# Patient Record
Sex: FEMALE | Race: WHITE | NOT HISPANIC OR LATINO | Employment: FULL TIME | URBAN - METROPOLITAN AREA
[De-identification: names, ages, dates, MRNs, and addresses within clinical notes are randomized per-mention and may not be internally consistent; named-entity substitution may affect disease eponyms.]

---

## 2019-07-12 ENCOUNTER — TRANSCRIBE ORDERS (OUTPATIENT)
Dept: LAB | Facility: HOSPITAL | Age: 29
End: 2019-07-12

## 2019-07-12 ENCOUNTER — LAB (OUTPATIENT)
Dept: LAB | Facility: HOSPITAL | Age: 29
End: 2019-07-12

## 2019-07-12 DIAGNOSIS — Z3A.00 WEEKS OF GESTATION OF PREGNANCY NOT SPECIFIED: Primary | ICD-10-CM

## 2019-07-12 DIAGNOSIS — Z34.81 PRENATAL CARE, SUBSEQUENT PREGNANCY, FIRST TRIMESTER: ICD-10-CM

## 2019-07-12 DIAGNOSIS — Z3A.00 WEEKS OF GESTATION OF PREGNANCY NOT SPECIFIED: ICD-10-CM

## 2019-07-12 LAB
ABO GROUP BLD: NORMAL
BLD GP AB SCN SERPL QL: NEGATIVE
DEPRECATED RDW RBC AUTO: 41.9 FL (ref 37–54)
ERYTHROCYTE [DISTWIDTH] IN BLOOD BY AUTOMATED COUNT: 12.5 % (ref 12.3–15.4)
HCG INTACT+B SERPL-ACNC: 175.5 MIU/ML
HCT VFR BLD AUTO: 41.8 % (ref 34–46.6)
HGB BLD-MCNC: 13.7 G/DL (ref 12–15.9)
MCH RBC QN AUTO: 30 PG (ref 26.6–33)
MCHC RBC AUTO-ENTMCNC: 32.8 G/DL (ref 31.5–35.7)
MCV RBC AUTO: 91.7 FL (ref 79–97)
PLATELET # BLD AUTO: 291 10*3/MM3 (ref 140–450)
PMV BLD AUTO: 11.3 FL (ref 6–12)
PROGEST SERPL-MCNC: 8.06 NG/ML
RBC # BLD AUTO: 4.56 10*6/MM3 (ref 3.77–5.28)
RH BLD: NEGATIVE
WBC NRBC COR # BLD: 11.04 10*3/MM3 (ref 3.4–10.8)

## 2019-07-12 PROCEDURE — 84144 ASSAY OF PROGESTERONE: CPT

## 2019-07-12 PROCEDURE — 86850 RBC ANTIBODY SCREEN: CPT

## 2019-07-12 PROCEDURE — 86900 BLOOD TYPING SEROLOGIC ABO: CPT

## 2019-07-12 PROCEDURE — 84702 CHORIONIC GONADOTROPIN TEST: CPT

## 2019-07-12 PROCEDURE — 36415 COLL VENOUS BLD VENIPUNCTURE: CPT

## 2019-07-12 PROCEDURE — 85027 COMPLETE CBC AUTOMATED: CPT

## 2019-07-12 PROCEDURE — 86901 BLOOD TYPING SEROLOGIC RH(D): CPT

## 2019-07-15 ENCOUNTER — LAB (OUTPATIENT)
Dept: LAB | Facility: HOSPITAL | Age: 29
End: 2019-07-15

## 2019-07-15 ENCOUNTER — TRANSCRIBE ORDERS (OUTPATIENT)
Dept: LAB | Facility: HOSPITAL | Age: 29
End: 2019-07-15

## 2019-07-15 DIAGNOSIS — Z32.01 PREGNANCY EXAMINATION OR TEST, POSITIVE RESULT: Primary | ICD-10-CM

## 2019-07-15 DIAGNOSIS — Z32.01 PREGNANCY EXAMINATION OR TEST, POSITIVE RESULT: ICD-10-CM

## 2019-07-15 LAB — HCG INTACT+B SERPL-ACNC: 513.7 MIU/ML

## 2019-07-15 PROCEDURE — 84702 CHORIONIC GONADOTROPIN TEST: CPT

## 2019-07-15 PROCEDURE — 36415 COLL VENOUS BLD VENIPUNCTURE: CPT

## 2019-07-18 ENCOUNTER — TRANSCRIBE ORDERS (OUTPATIENT)
Dept: LAB | Facility: HOSPITAL | Age: 29
End: 2019-07-18

## 2019-07-18 ENCOUNTER — LAB (OUTPATIENT)
Dept: LAB | Facility: HOSPITAL | Age: 29
End: 2019-07-18

## 2019-07-18 DIAGNOSIS — Z32.01 PREGNANCY EXAMINATION OR TEST, POSITIVE RESULT: ICD-10-CM

## 2019-07-18 DIAGNOSIS — Z32.01 PREGNANCY EXAMINATION OR TEST, POSITIVE RESULT: Primary | ICD-10-CM

## 2019-07-18 LAB
HCG INTACT+B SERPL-ACNC: 670.2 MIU/ML
PROGEST SERPL-MCNC: 4.48 NG/ML

## 2019-07-18 PROCEDURE — 84144 ASSAY OF PROGESTERONE: CPT

## 2019-07-18 PROCEDURE — 36415 COLL VENOUS BLD VENIPUNCTURE: CPT

## 2019-07-18 PROCEDURE — 84702 CHORIONIC GONADOTROPIN TEST: CPT

## 2019-07-23 ENCOUNTER — LAB (OUTPATIENT)
Dept: LAB | Facility: HOSPITAL | Age: 29
End: 2019-07-23

## 2019-07-23 DIAGNOSIS — N92.5 RETROGRADE MENSTRUATION: Primary | ICD-10-CM

## 2019-07-23 LAB
HCG INTACT+B SERPL-ACNC: 1012 MIU/ML
PROGEST SERPL-MCNC: 9.65 NG/ML

## 2019-07-23 PROCEDURE — 84144 ASSAY OF PROGESTERONE: CPT

## 2019-07-23 PROCEDURE — 84702 CHORIONIC GONADOTROPIN TEST: CPT

## 2019-07-23 PROCEDURE — 36415 COLL VENOUS BLD VENIPUNCTURE: CPT

## 2019-08-09 ENCOUNTER — LAB REQUISITION (OUTPATIENT)
Dept: LAB | Facility: HOSPITAL | Age: 29
End: 2019-08-09

## 2019-08-09 DIAGNOSIS — O02.1 MISSED ABORTION: ICD-10-CM

## 2019-08-09 PROCEDURE — 88305 TISSUE EXAM BY PATHOLOGIST: CPT | Performed by: OBSTETRICS & GYNECOLOGY

## 2019-08-12 LAB
CYTO UR: NORMAL
LAB AP CASE REPORT: NORMAL
LAB AP CLINICAL INFORMATION: NORMAL
PATH REPORT.FINAL DX SPEC: NORMAL
PATH REPORT.GROSS SPEC: NORMAL

## 2021-10-19 VITALS
DIASTOLIC BLOOD PRESSURE: 83 MMHG | TEMPERATURE: 98.4 F | HEIGHT: 65 IN | HEART RATE: 104 BPM | BODY MASS INDEX: 36.65 KG/M2 | OXYGEN SATURATION: 99 % | SYSTOLIC BLOOD PRESSURE: 149 MMHG | WEIGHT: 220 LBS | RESPIRATION RATE: 16 BRPM

## 2021-10-19 PROCEDURE — 96372 THER/PROPH/DIAG INJ SC/IM: CPT

## 2021-10-19 PROCEDURE — 99283 EMERGENCY DEPT VISIT LOW MDM: CPT

## 2021-10-20 ENCOUNTER — HOSPITAL ENCOUNTER (EMERGENCY)
Facility: HOSPITAL | Age: 31
Discharge: HOME | End: 2021-10-20
Attending: EMERGENCY MEDICINE | Admitting: EMERGENCY MEDICINE

## 2021-10-20 ENCOUNTER — APPOINTMENT (OUTPATIENT)
Dept: ULTRASOUND IMAGING | Facility: HOSPITAL | Age: 31
End: 2021-10-20

## 2021-10-20 DIAGNOSIS — O20.0 THREATENED MISCARRIAGE IN EARLY PREGNANCY: Primary | ICD-10-CM

## 2021-10-20 LAB
ABO GROUP BLD: NORMAL
ABO GROUP BLD: NORMAL
B-HCG UR QL: POSITIVE
BACTERIA UR QL AUTO: ABNORMAL /HPF
BASOPHILS # BLD AUTO: 0.06 10*3/MM3 (ref 0–0.2)
BASOPHILS NFR BLD AUTO: 0.6 % (ref 0–1.5)
BILIRUB UR QL STRIP: NEGATIVE
CLARITY UR: CLEAR
COLOR UR: YELLOW
DEPRECATED RDW RBC AUTO: 39.4 FL (ref 37–54)
EOSINOPHIL # BLD AUTO: 0.13 10*3/MM3 (ref 0–0.4)
EOSINOPHIL NFR BLD AUTO: 1.3 % (ref 0.3–6.2)
ERYTHROCYTE [DISTWIDTH] IN BLOOD BY AUTOMATED COUNT: 12.2 % (ref 12.3–15.4)
GLUCOSE UR STRIP-MCNC: NEGATIVE MG/DL
HCG INTACT+B SERPL-ACNC: NORMAL MIU/ML
HCT VFR BLD AUTO: 38.7 % (ref 34–46.6)
HGB BLD-MCNC: 13.2 G/DL (ref 12–15.9)
HGB UR QL STRIP.AUTO: ABNORMAL
HYALINE CASTS UR QL AUTO: ABNORMAL /LPF
IMM GRANULOCYTES # BLD AUTO: 0.02 10*3/MM3 (ref 0–0.05)
IMM GRANULOCYTES NFR BLD AUTO: 0.2 % (ref 0–0.5)
KETONES UR QL STRIP: NEGATIVE
LEUKOCYTE ESTERASE UR QL STRIP.AUTO: NEGATIVE
LYMPHOCYTES # BLD AUTO: 2.08 10*3/MM3 (ref 0.7–3.1)
LYMPHOCYTES NFR BLD AUTO: 20.4 % (ref 19.6–45.3)
MCH RBC QN AUTO: 30.1 PG (ref 26.6–33)
MCHC RBC AUTO-ENTMCNC: 34.1 G/DL (ref 31.5–35.7)
MCV RBC AUTO: 88.2 FL (ref 79–97)
MONOCYTES # BLD AUTO: 0.7 10*3/MM3 (ref 0.1–0.9)
MONOCYTES NFR BLD AUTO: 6.9 % (ref 5–12)
NEUTROPHILS NFR BLD AUTO: 7.21 10*3/MM3 (ref 1.7–7)
NEUTROPHILS NFR BLD AUTO: 70.6 % (ref 42.7–76)
NITRITE UR QL STRIP: NEGATIVE
NRBC BLD AUTO-RTO: 0 /100 WBC (ref 0–0.2)
NUMBER OF DOSES: NORMAL
PH UR STRIP.AUTO: 6 [PH] (ref 5–8)
PLATELET # BLD AUTO: 204 10*3/MM3 (ref 140–450)
PMV BLD AUTO: 9.9 FL (ref 6–12)
PROT UR QL STRIP: NEGATIVE
RBC # BLD AUTO: 4.39 10*6/MM3 (ref 3.77–5.28)
RBC # UR: ABNORMAL /HPF
REF LAB TEST METHOD: ABNORMAL
RH BLD: NEGATIVE
RH BLD: NEGATIVE
SP GR UR STRIP: 1.01 (ref 1–1.03)
SQUAMOUS #/AREA URNS HPF: ABNORMAL /HPF
UROBILINOGEN UR QL STRIP: ABNORMAL
WBC # BLD AUTO: 10.2 10*3/MM3 (ref 3.4–10.8)
WBC UR QL AUTO: ABNORMAL /HPF

## 2021-10-20 PROCEDURE — 84702 CHORIONIC GONADOTROPIN TEST: CPT | Performed by: EMERGENCY MEDICINE

## 2021-10-20 PROCEDURE — 81001 URINALYSIS AUTO W/SCOPE: CPT | Performed by: EMERGENCY MEDICINE

## 2021-10-20 PROCEDURE — 96372 THER/PROPH/DIAG INJ SC/IM: CPT

## 2021-10-20 PROCEDURE — 85025 COMPLETE CBC W/AUTO DIFF WBC: CPT | Performed by: EMERGENCY MEDICINE

## 2021-10-20 PROCEDURE — 25010000002 RHO D IMMUNE GLOBULIN 1500 UNIT/2ML SOLUTION PREFILLED SYRINGE: Performed by: EMERGENCY MEDICINE

## 2021-10-20 PROCEDURE — 86901 BLOOD TYPING SEROLOGIC RH(D): CPT | Performed by: EMERGENCY MEDICINE

## 2021-10-20 PROCEDURE — 81025 URINE PREGNANCY TEST: CPT | Performed by: EMERGENCY MEDICINE

## 2021-10-20 PROCEDURE — 76817 TRANSVAGINAL US OBSTETRIC: CPT

## 2021-10-20 PROCEDURE — 86900 BLOOD TYPING SEROLOGIC ABO: CPT | Performed by: EMERGENCY MEDICINE

## 2021-10-20 RX ADMIN — HUMAN RHO(D) IMMUNE GLOBULIN 300 MCG: 1500 SOLUTION INTRAMUSCULAR; INTRAVENOUS at 02:53

## 2021-10-20 NOTE — ED PROVIDER NOTES
Subjective   History of Present Illness    Chief Complaint: Vaginal bleeding 7 weeks pregnant  History of Present Illness: 31-year-old female presents with vaginal bleeding began this evening.  Stop now.  History of 3 prior miscarriages.  Patient is concerned for miscarriage.  Onset: This evening  Duration: Single episode now resolved  Exacerbating / Alleviating factors: None  Associated symptoms: None      Nurses Notes reviewed and agree, including vitals, allergies, social history and prior medical history.     REVIEW OF SYSTEMS: All systems reviewed and not pertinent unless noted.    Positive for: Vaginal bleeding    Negative for: Pelvic pain syncope profuse bleeding  Review of Systems    Past Medical History:   Diagnosis Date   • PCOS (polycystic ovarian syndrome)        Allergies   Allergen Reactions   • Amoxicillin Hives   • Sulfa Antibiotics Hives       Past Surgical History:   Procedure Laterality Date   • CHOLECYSTECTOMY         History reviewed. No pertinent family history.    Social History     Socioeconomic History   • Marital status:    Tobacco Use   • Smoking status: Former Smoker   Vaping Use   • Vaping Use: Every day   • Substances: Nicotine   • Devices: Disposable   Substance and Sexual Activity   • Alcohol use: Never   • Drug use: Never           Objective   Physical Exam    CONSTITUTIONAL: Well developed, healthy appearing nontoxic 31-year-old  female,  in no acute distress.  VITAL SIGNS: per nursing, reviewed and noted  SKIN: exposed skin with no rashes, ulcerations or petechiae.  EYES: perrla. EOMI.  ENT: Normal voice.  Patient maintained wearing a mask throughout patient encounter due to coronavirus pandemic  RESPIRATORY:  No increased work of breathing. No retractions.   CARDIOVASCULAR:  regular rate and rhythm, no murmurs.  Good Peripheral pulses. Good cap refill to extremities.   GI: Abdomen soft, nontender, normal bowel sounds. No hernia. No ascites.  MUSCULOSKELETAL:  No  tenderness. Full ROM. Strength and tone grossly normal.  no spasms. no neck or back tenderness or spasm.   NEUROLOGIC: Alert, oriented x 3. No gross deficits. GCS 15.   PSYCH: appropriate affect.  : no bladder tenderness or distention, no CVA tenderness      Procedures     No attending physician procedures were performed on this patient.      ED Course  ED Course as of 10/20/21 0448   Wed Oct 20, 2021   0447 WBC, UA(!): 3-5 [PF]   0447 Blood, UA(!): Small (1+) [PF]   0447 HCG, Urine QL(!): Positive [PF]   0448 RH type: Negative [PF]   0448 ABO Type: AB [PF]   0448 HCG Quantitative: 91,581.00 [PF]   0448 WBC: 10.20 [PF]   0448 Hemoglobin: 13.2 [PF]   0448 Hematocrit: 38.7 [PF]   0448 Platelets: 204 [PF]      ED Course User Index  [PF] Shun Serrano DO      31-year-old female presented with vaginal bleeding 7weeks pregnant.  We will add ABO Rh, hCG quant, transvaginal ultrasound.  Monitor.  Disposition pending        Patient administered RhoGam in emergency department.  Discharged home stable condition outpatient follow-up with OB, return precautions discussed.                             MDM    Final diagnoses:   Threatened miscarriage in early pregnancy       ED Disposition  ED Disposition     ED Disposition Condition Comment    Discharge Stable           Janna Stoddard, APRCLAIRE  [address] [facility] [address] [phone]          [facility] Emergency Department  [address] [address] [phone]    As needed, If symptoms worsen    Ramirez Flores MD  [address] [address] [phone]               Medication List      No changes were made to your prescriptions during this visit.          Shun Serrano DO  10/20/21 0448

## 2021-10-27 ENCOUNTER — INITIAL PRENATAL (OUTPATIENT)
Dept: OBSTETRICS AND GYNECOLOGY | Facility: CLINIC | Age: 31
End: 2021-10-27

## 2021-10-27 VITALS — SYSTOLIC BLOOD PRESSURE: 110 MMHG | DIASTOLIC BLOOD PRESSURE: 74 MMHG | BODY MASS INDEX: 37.11 KG/M2 | WEIGHT: 223 LBS

## 2021-10-27 DIAGNOSIS — Z3A.01 LESS THAN 8 WEEKS GESTATION OF PREGNANCY: ICD-10-CM

## 2021-10-27 DIAGNOSIS — Z12.4 SCREENING FOR MALIGNANT NEOPLASM OF CERVIX: Primary | ICD-10-CM

## 2021-10-27 PROCEDURE — 99203 OFFICE O/P NEW LOW 30 MIN: CPT | Performed by: OBSTETRICS & GYNECOLOGY

## 2021-10-27 NOTE — PROGRESS NOTES
Subjective   Chief Complaint   Patient presents with   • Initial Prenatal Visit     NEw OB, seen in ER 10/20/21 for spotting,patient has had 3 miscarriages in past. No complaints/concerns      Melvin Jarvis is a 31 y.o. year old .  Patient's last menstrual period was 2021.  She presents to be seen because of  NEW OB- paitent had VB last week-- w/u negative @ ED- normal TVS. Got Rhogma.   G1:  41, 7#6, PP hemorrhage-- received one injection  3 AB:s , most recent  require suction D&C the first 2 preceded her live vaginal birth.    OTHER COMPLAINTS:  Nothing else    The following portions of the patient's history were reviewed and updated as appropriate:  She  has a past medical history of PCOS (polycystic ovarian syndrome) and Polycystic ovary syndrome.  She does not have a problem list on file.  She  has a past surgical history that includes Cholecystectomy and d & c with suction.  Her family history is not on file.  She  reports that she has quit smoking. She has never used smokeless tobacco. She reports that she does not drink alcohol and does not use drugs.  No current outpatient medications on file.     No current facility-administered medications for this visit.     No current outpatient medications on file prior to visit.     No current facility-administered medications on file prior to visit.     She is allergic to amoxicillin, latex, penicillins, shellfish-derived products, and sulfa antibiotics.    Social History    Tobacco Use      Smoking status: Former Smoker      Smokeless tobacco: Never Used    Review of Systems  Consitutional POS: nothing reported    NEG: anorexia or night sweats   Gastointestinal POS: nothing reported    NEG: bloating, change in bowel habits, melena or reflux symptoms   Genitourinary POS: nothing reported    NEG: dysuria or hematuria   Integument POS: nothing reported    NEG: moles that are changing in size, shape, color or rashes   Breast POS: nothing reported     NEG: persistent breast lump, skin dimpling or nipple discharge         Respiratory: negative  Cardiovascular: negative          Objective   /74   Wt 101 kg (223 lb)   LMP 09/02/2021   BMI 37.11 kg/m²     General:  well developed; well nourished  no acute distress   Skin:  No suspicious lesions seen   Thyroid: normal to inspection and palpation   Lungs:  breathing is unlabored  clear to auscultation bilaterally   Heart:  regular rate and rhythm, S1, S2 normal, no murmur, click, rub or gallop   Breasts:  Not performed.   Abdomen: soft, non-tender; no masses  no umbilical or inguinal hernias are present  no hepato-splenomegaly   Pelvis: Not performed.     Psychiatric: Alert and oriented ×3, mood and affect appropriate  HEENT: Atraumatic, normocephalic, normal scleral icterus  Extremities: 2+ pulses bilaterally, no edema      Lab Review   No data reviewed    Imaging   Pelvic ultrasound report        Assessment   1. NOB approximately 8 weeks with reassuring ultrasound 1 week ago  2. Vaginal bleeding first trimester this is since resolved.  Patient received RhoGam.  3. History of 3 diabetes though only one was after her last vaginal birth     Plan   1. Prenatal labs today  2. Pelvic rest  3. Repeat transvaginal sonogram in 1 week  4. Progesterone suppository  5.     No orders of the defined types were placed in this encounter.         This note was electronically signed.      October 27, 2021

## 2021-10-28 LAB
ABO GROUP BLD: ABNORMAL
BASOPHILS # BLD AUTO: 0 X10E3/UL (ref 0–0.2)
BASOPHILS NFR BLD AUTO: 0 %
BLD GP AB SCN SERPL QL: ABNORMAL
BLD GP AB SCN SERPL QL: POSITIVE
BLOOD GROUP ANTIBODIES SERPL: ABNORMAL
EOSINOPHIL # BLD AUTO: 0.1 X10E3/UL (ref 0–0.4)
EOSINOPHIL NFR BLD AUTO: 1 %
ERYTHROCYTE [DISTWIDTH] IN BLOOD BY AUTOMATED COUNT: 12.2 % (ref 11.7–15.4)
HBA1C MFR BLD: 5.2 % (ref 4.8–5.6)
HBV SURFACE AG SERPL QL IA: NEGATIVE
HCT VFR BLD AUTO: 37.2 % (ref 34–46.6)
HCV AB S/CO SERPL IA: <0.1 S/CO RATIO (ref 0–0.9)
HGB BLD-MCNC: 12.9 G/DL (ref 11.1–15.9)
HIV 1+2 AB+HIV1 P24 AG SERPL QL IA: NON REACTIVE
IMM GRANULOCYTES # BLD AUTO: 0.1 X10E3/UL (ref 0–0.1)
IMM GRANULOCYTES NFR BLD AUTO: 1 %
LYMPHOCYTES # BLD AUTO: 1.9 X10E3/UL (ref 0.7–3.1)
LYMPHOCYTES NFR BLD AUTO: 20 %
MCH RBC QN AUTO: 30.7 PG (ref 26.6–33)
MCHC RBC AUTO-ENTMCNC: 34.7 G/DL (ref 31.5–35.7)
MCV RBC AUTO: 89 FL (ref 79–97)
MONOCYTES # BLD AUTO: 0.7 X10E3/UL (ref 0.1–0.9)
MONOCYTES NFR BLD AUTO: 7 %
NEUTROPHILS # BLD AUTO: 6.7 X10E3/UL (ref 1.4–7)
NEUTROPHILS NFR BLD AUTO: 71 %
PLATELET # BLD AUTO: 254 X10E3/UL (ref 150–450)
RBC # BLD AUTO: 4.2 X10E6/UL (ref 3.77–5.28)
RH BLD: NEGATIVE
RPR SER QL: NON REACTIVE
RUBV IGG SERPL IA-ACNC: <0.9 INDEX
WBC # BLD AUTO: 9.5 X10E3/UL (ref 3.4–10.8)
XXX BLOOD GROUP AB TITR SERPL AHG: ABNORMAL {TITER}

## 2021-11-02 ENCOUNTER — ROUTINE PRENATAL (OUTPATIENT)
Dept: OBSTETRICS AND GYNECOLOGY | Facility: CLINIC | Age: 31
End: 2021-11-02

## 2021-11-02 VITALS — BODY MASS INDEX: 37.11 KG/M2 | WEIGHT: 223 LBS | DIASTOLIC BLOOD PRESSURE: 68 MMHG | SYSTOLIC BLOOD PRESSURE: 128 MMHG

## 2021-11-02 DIAGNOSIS — O09.91 ENCOUNTER FOR SUPERVISION OF HIGH RISK PREGNANCY IN FIRST TRIMESTER, ANTEPARTUM: Primary | ICD-10-CM

## 2021-11-02 DIAGNOSIS — N96 HISTORY OF RECURRENT MISCARRIAGES: ICD-10-CM

## 2021-11-02 DIAGNOSIS — O20.9 VAGINAL BLEEDING AFFECTING EARLY PREGNANCY: ICD-10-CM

## 2021-11-02 DIAGNOSIS — Z3A.10 10 WEEKS GESTATION OF PREGNANCY: ICD-10-CM

## 2021-11-02 PROCEDURE — 99214 OFFICE O/P EST MOD 30 MIN: CPT | Performed by: OBSTETRICS & GYNECOLOGY

## 2021-11-03 NOTE — PROGRESS NOTES
Chief Complaint  Routine Prenatal Visit (TVS, no complaints. )    History of Present Illness:  Melvin is a  currently at 9w3d who presents today with no complaints.  Patient has not had any further vaginal bleeding.  Patient does report previously with her bleeding she had recently had intercourse.  Patient has a history of recurrent miscarriages.  Patient did start her progesterone suppositories.  Patient did have labs last visit as noted.  Patient did receive Rhogam in ER.    Exam:  Vitals:  See prenatal flowsheet as noted and reviewed  General: Alert, cooperative, and does not appear in any distress  Abdomen:   See prenatal flowsheet as noted and reviewed    Uterus gravid, non-tender; no palpable masses    No guarding or rebound tenderness  Pelvic:  See prenatal flowsheet as noted and reviewed  Ext:  See prenatal flowsheet as noted and reviewed    Moves extremities well, no cyanosis and no redness  Urine:  See prenatal flowsheet as noted and reviewed    Data Review:  The following data was reviewed by: Paige Zaldivar MD on 2021:  Prenatal Labs:  Lab Results   Component Value Date    HGB 12.9 10/27/2021    RUBELLAABIGG <0.90 (L) 10/27/2021    HEPBSAG Negative 10/27/2021    ABO AB 10/27/2021    RH Negative 10/27/2021    ABSCRN See Final Results 10/27/2021    ABSCRN Positive (A) 10/27/2021    [id number] Non Reactive 10/27/2021    HEPCVIRUSABY <0.1 10/27/2021       Initial Prenatal on 10/27/2021   Component Date Value   • Hepatitis B Surface Ag 10/27/2021 Negative    • Hep C Virus Ab 10/27/2021 <0.1    • RPR 10/27/2021 Non Reactive    • Rubella Antibodies, IgG 10/27/2021 <0.90*   • ABO Type 10/27/2021 AB    • Rh Factor 10/27/2021 Negative    • Antibody Screen 10/27/2021 See Final Results    • HIV Screen 4th Gen w/RFX* 10/27/2021 Non Reactive    • WBC 10/27/2021 9.5    • RBC 10/27/2021 4.20    • Hemoglobin 10/27/2021 12.9    • Hematocrit 10/27/2021 37.2    • MCV 10/27/2021 89    • MCH 10/27/2021 30.7    •  MCHC 10/27/2021 34.7    • RDW 10/27/2021 12.2    • Platelets 10/27/2021 254    • Neutrophil Rel % 10/27/2021 71    • Lymphocyte Rel % 10/27/2021 20    • Monocyte Rel % 10/27/2021 7    • Eosinophil Rel % 10/27/2021 1    • Basophil Rel % 10/27/2021 0    • Neutrophils Absolute 10/27/2021 6.7    • Lymphocytes Absolute 10/27/2021 1.9    • Monocytes Absolute 10/27/2021 0.7    • Eosinophils Absolute 10/27/2021 0.1    • Basophils Absolute 10/27/2021 0.0    • Immature Granulocyte Rel* 10/27/2021 1    • Immature Grans Absolute 10/27/2021 0.1    • Hemoglobin A1C 10/27/2021 5.2    • Antibody Screen 10/27/2021 Positive*   • Antibody Id. #1 10/27/2021 Anti-D    • Antonietta Titer #1 10/27/2021 Comment    Admission on 10/20/2021, Discharged on 10/20/2021   Component Date Value   • Color, UA 10/20/2021 Yellow    • Appearance, UA 10/20/2021 Clear    • pH, UA 10/20/2021 6.0    • Specific Gravity, UA 10/20/2021 1.008    • Glucose, UA 10/20/2021 Negative    • Ketones, UA 10/20/2021 Negative    • Bilirubin, UA 10/20/2021 Negative    • Blood, UA 10/20/2021 Small (1+)*   • Protein, UA 10/20/2021 Negative    • Leuk Esterase, UA 10/20/2021 Negative    • Nitrite, UA 10/20/2021 Negative    • Urobilinogen, UA 10/20/2021 0.2 E.U./dL    • HCG, Urine QL 10/20/2021 Positive*   • ABO Type 10/20/2021 AB    • RH type 10/20/2021 Negative    • HCG Quantitative 10/20/2021 91,581.00    • WBC 10/20/2021 10.20    • RBC 10/20/2021 4.39    • Hemoglobin 10/20/2021 13.2    • Hematocrit 10/20/2021 38.7    • MCV 10/20/2021 88.2    • MCH 10/20/2021 30.1    • MCHC 10/20/2021 34.1    • RDW 10/20/2021 12.2*   • RDW-SD 10/20/2021 39.4    • MPV 10/20/2021 9.9    • Platelets 10/20/2021 204    • Neutrophil % 10/20/2021 70.6    • Lymphocyte % 10/20/2021 20.4    • Monocyte % 10/20/2021 6.9    • Eosinophil % 10/20/2021 1.3    • Basophil % 10/20/2021 0.6    • Immature Grans % 10/20/2021 0.2    • Neutrophils, Absolute 10/20/2021 7.21*   • Lymphocytes, Absolute 10/20/2021 2.08     • Monocytes, Absolute 10/20/2021 0.70    • Eosinophils, Absolute 10/20/2021 0.13    • Basophils, Absolute 10/20/2021 0.06    • Immature Grans, Absolute 10/20/2021 0.02    • nRBC 10/20/2021 0.0    • RBC, UA 10/20/2021 None Seen    • WBC, UA 10/20/2021 3-5*   • Bacteria, UA 10/20/2021 None Seen    • Squamous Epithelial Cell* 10/20/2021 0-2    • Hyaline Casts, UA 10/20/2021 None Seen    • Methodology 10/20/2021 Manual Light Microscopy    • ABO Type 10/20/2021 AB    • RH type 10/20/2021 Negative    • Number of Doses 10/20/2021 Recommend 1 vial of RhIg      Imaging:  US Ob Transvaginal  Melvin Jarvis  : 1990  MRN: [id number]  Date: 2021    Reason for exam/History: Viability, spotting    Ultrasound images are reviewed.  An intrauterine pregnancy is noted.  A   gestational sac is seen.  A yolk sac is noted.  The pregnancy measures 9   weeks 0 days gestation.  Cardiac activity is noted.  The fetal heart rate   measures 161  beats per minute.  The adnexa was noted to be normal in   appearance.  There is a 1.16 cm collapsing cyst seen on the left ovary   consistent with a corpus luteal cyst of pregnancy.  There was normal   vascular flow. There is no free fluid noted.    The exam limitations noted:  none    See ultrasound report for measurements and structures identified.    Paige Zaldivar MD, [facility]  OB GYN [place]    Medical Records:  None    Assessment and Plan:  Problem List Items Addressed This Visit     None      Visit Diagnoses     Encounter for supervision of high risk pregnancy in first trimester, antepartum    -  Primary  Topics discussed:     ab precautions  genetic screening - Today we discussed genetic testing.  She is aware that the MSAFP-4 and NIPT - [id number] is a screening test.  A screening test is not a diagnostic test.  This means that a negative test does not guarantee an unaffected fetus and a positive test does not mean the fetus has the condition for which the  test is being performed.  If the test returns positive, a diagnostic test should be consider to determine if the fetus in fact has the condition.  After considering the options previously presented, she is interested in having genetic testing performed.  Patient will return for genetic testing as discussed.  Patient is to follow-up with repeat imaging and labs as discussed.    10 weeks gestation of pregnancy        Relevant Orders    [id number] PLUS Core+SCA - Blood,    History of recurrent miscarriages      Patient is to continue her progesterone suppositories as discussed.    Vaginal bleeding affecting early pregnancy      Patient is to continue pelvic rest at present.        Follow Up/Instructions:  Follow up as scheduled.  Patient was given instructions and counseling regarding her condition or for health maintenance advice. Please see specific information pulled into the AVS if appropriate.     Note: Speech recognition transcription software may have been used to dictate portions of this document.  An attempt at proofreading has been made though minor errors in transcription may still be present.    This note was electronically signed.  Paige Zaldivar M.D.

## 2021-11-12 ENCOUNTER — LAB (OUTPATIENT)
Dept: LAB | Facility: HOSPITAL | Age: 31
End: 2021-11-12

## 2021-11-12 DIAGNOSIS — Z3A.10 10 WEEKS GESTATION OF PREGNANCY: ICD-10-CM

## 2021-11-12 PROCEDURE — 36415 COLL VENOUS BLD VENIPUNCTURE: CPT

## 2021-11-16 ENCOUNTER — ROUTINE PRENATAL (OUTPATIENT)
Dept: OBSTETRICS AND GYNECOLOGY | Facility: CLINIC | Age: 31
End: 2021-11-16

## 2021-11-16 VITALS — DIASTOLIC BLOOD PRESSURE: 74 MMHG | SYSTOLIC BLOOD PRESSURE: 128 MMHG | WEIGHT: 226 LBS | BODY MASS INDEX: 37.61 KG/M2

## 2021-11-16 DIAGNOSIS — O26.21 HIGH RISK PREGNANCY IN FIRST TRIMESTER DUE TO RECURRENT PREGNANCY LOSS: Primary | ICD-10-CM

## 2021-11-16 PROCEDURE — 99213 OFFICE O/P EST LOW 20 MIN: CPT | Performed by: MIDWIFE

## 2021-11-16 RX ORDER — PRENATAL VIT/IRON FUM/FOLIC AC 27MG-0.8MG
TABLET ORAL DAILY
COMMUNITY

## 2021-11-16 NOTE — PROGRESS NOTES
Chief Complaint   Patient presents with   • Routine Prenatal Visit     Patient states she is doing well        HPI: Melvin is a  currently at 11w2d who today reports the following:  She has continued her progesterone suppositories.  She states 1 day she had a bad headache that finally resolved with rest.  She has also felt that she has been more melton while using the suppositories.  She denies any bleeding.                EXAM:     Vitals:    21 1549   BP: 128/74      Abdomen:   See prenatal flowsheet as noted and reviewed, soft, nontender   Pelvic:  See prenatal flowsheet as noted and reviewed   Urine:  See prenatal flowsheet as noted and reviewed    Lab Results   Component Value Date    ABO AB 10/27/2021    RH Negative 10/27/2021    ABSCRN See Final Results 10/27/2021    ABSCRN Positive (A) 10/27/2021       MDM:  Impression: Supervision of high risk pregnancy-recurrent miscarriages  Rh negative   Tests done today: none   Topics discussed: Continue progesterone suppositories  Awaiting genetic screening  Reviewed OB labs  Discussed danger signs   Tests next visit: none                RTO:                        4 weeks    This note was electronically signed.  Crystal Craven, APRN  2021

## 2021-11-18 LAB
CFDNA.FET/CFDNA.TOTAL SFR FETUS: NORMAL %
CITATION REF LAB TEST: NORMAL
FET 13+18+21+X+Y ANEUP PLAS.CFDNA: NEGATIVE
FET CHR 21 TS PLAS.CFDNA QL: NEGATIVE
FET MS X RISK WBC.DNA+CFDNA QL: NOT DETECTED
FET SEX PLAS.CFDNA DOSAGE CFDNA: NORMAL
FET TS 13 RISK PLAS.CFDNA QL: NEGATIVE
FET TS 18 RISK WBC.DNA+CFDNA QL: NEGATIVE
FET X + Y ANEUP RISK PLAS.CFDNA SEQ-IMP: NOT DETECTED
GA EST FROM CONCEPTION DATE: NORMAL D
GESTATIONAL AGE > 9:: YES
LAB DIRECTOR NAME PROVIDER: NORMAL
LAB DIRECTOR NAME PROVIDER: NORMAL
LABORATORY COMMENT REPORT: NORMAL
LIMITATIONS OF THE TEST: NORMAL
NEGATIVE PREDICTIVE VALUE: NORMAL
NOTE: NORMAL
PERFORMANCE CHARACTERISTICS: NORMAL
POSITIVE PREDICTIVE VALUE: NORMAL
REF LAB TEST METHOD: NORMAL
TEST PERFORMANCE INFO SPEC: NORMAL

## 2021-12-14 ENCOUNTER — ROUTINE PRENATAL (OUTPATIENT)
Dept: OBSTETRICS AND GYNECOLOGY | Facility: CLINIC | Age: 31
End: 2021-12-14

## 2021-12-14 VITALS — BODY MASS INDEX: 38.44 KG/M2 | WEIGHT: 231 LBS | DIASTOLIC BLOOD PRESSURE: 68 MMHG | SYSTOLIC BLOOD PRESSURE: 132 MMHG

## 2021-12-14 DIAGNOSIS — N96 HISTORY OF RECURRENT MISCARRIAGES: ICD-10-CM

## 2021-12-14 DIAGNOSIS — Z3A.15 15 WEEKS GESTATION OF PREGNANCY: ICD-10-CM

## 2021-12-14 DIAGNOSIS — O21.9 NAUSEA AND VOMITING DURING PREGNANCY PRIOR TO 22 WEEKS GESTATION: ICD-10-CM

## 2021-12-14 DIAGNOSIS — O09.92 ENCOUNTER FOR SUPERVISION OF HIGH RISK PREGNANCY IN SECOND TRIMESTER, ANTEPARTUM: Primary | ICD-10-CM

## 2021-12-14 DIAGNOSIS — K21.9 GASTROESOPHAGEAL REFLUX DISEASE WITHOUT ESOPHAGITIS: ICD-10-CM

## 2021-12-14 PROCEDURE — 99214 OFFICE O/P EST MOD 30 MIN: CPT | Performed by: OBSTETRICS & GYNECOLOGY

## 2021-12-15 ENCOUNTER — TELEPHONE (OUTPATIENT)
Dept: OBSTETRICS AND GYNECOLOGY | Facility: CLINIC | Age: 31
End: 2021-12-15

## 2021-12-15 NOTE — TELEPHONE ENCOUNTER
----- Message from Daisy Banks MA sent at 12/15/2021  9:32 AM EST -----  Patient was seen on 12/14/2021, has developed vaginal discharge ( yellow-greenish) color, mild odor.  Want's to know if she needs to be seen or if she can have RX.    Dr Kayley Ellsworth [facility] [place]      Thank You

## 2021-12-15 NOTE — PROGRESS NOTES
Chief Complaint  Routine Prenatal Visit (No complaints. )    History of Present Illness:  Melvin is a  currently at 15w3d who presents today with no complaints.  Patient has been off her progesterone supplements for 2 weeks now.  Patient denies any cramping or contractions.  Patient denies any vaginal bleeding.  Patient has not felt any fetal movement at present.  Patient did have genetic screening as previously noted.   Patient does report having reflux.  Patient declines any treatment at present.  Patient does report having continued nausea and occasional emesis.    Exam:  Vitals:  See prenatal flowsheet as noted and reviewed  General: Alert, cooperative, and does not appear in any distress  Abdomen:   See prenatal flowsheet as noted and reviewed    Uterus gravid, non-tender; no palpable masses    No guarding or rebound tenderness  Pelvic:  See prenatal flowsheet as noted and reviewed  Ext:  See prenatal flowsheet as noted and reviewed    Moves extremities well, no cyanosis and no redness  Urine:  See prenatal flowsheet as noted and reviewed    Data Review:  The following data was reviewed by: Paige Zaldivar MD on 2021:  Prenatal Labs:  Lab Results   Component Value Date    HGB 12.9 10/27/2021    RUBELLAABIGG <0.90 (L) 10/27/2021    HEPBSAG Negative 10/27/2021    ABO AB 10/27/2021    RH Negative 10/27/2021    ABSCRN See Final Results 10/27/2021    ABSCRN Positive (A) 10/27/2021    [id number] Non Reactive 10/27/2021    HEPCVIRUSABY <0.1 10/27/2021       No visits with results within 1 Month(s) from this visit.   Latest known visit with results is:   Lab on 2021   Component Date Value   • Gestation 2021 Connelly    • Fetal Fraction 2021 9%    • Gestational Age >9: 2021 Yes    • Result 2021 Negative    •  Comments 2021 Comment    • Approved By 2021 Comment    • TRISOMY 21 (DOWN SYNDROM* 2021 Negative    • TRISOMY 18 (HAMMONDS SYND* 2021  Negative    • TRISOMY 13 (PATAU SYNDRO* 2021 Negative    • FETAL SEX 2021 Comment    • MONOSOMY X (ESQUEDA SYNDR* 2021 Not Detected    • XYY (HIRSCH SYNDROME) 2021 Not Detected    • XXY (KLINEFELTER SYNDROM* 2021 Not Detected    • XXX (TRIPLE X SYNDROME) 2021 Not Detected    • NEGATIVE PREDICTIVE VALUE 2021 Note    • POSITIVE PREDICTIVE VALUE 2021 N/A    • About The Test 2021 Comment    • Test Method 2021 Comment    • Performance 2021 Comment    • PERFORMANCE CHARACTERIST* 2021 Note    • Limitations of the Test 2021 Comment    • Note 2021 Comment    • References 2021 Comment      Imaging:  US Ob Transvaginal  Melvin Jarvis  : 1990  MRN: [id number]  Date: 2021    Reason for exam/History: Viability, spotting    Ultrasound images are reviewed.  An intrauterine pregnancy is noted.  A   gestational sac is seen.  A yolk sac is noted.  The pregnancy measures 9   weeks 0 days gestation.  Cardiac activity is noted.  The fetal heart rate   measures 161  beats per minute.  The adnexa was noted to be normal in   appearance.  There is a 1.16 cm collapsing cyst seen on the left ovary   consistent with a corpus luteal cyst of pregnancy.  There was normal   vascular flow. There is no free fluid noted.    The exam limitations noted:  none    See ultrasound report for measurements and structures identified.    Paige Zaldivar MD, [facility]  OB GYN [place]    Medical Records:  None    Assessment and Plan:  Problem List Items Addressed This Visit     None      Visit Diagnoses     Encounter for supervision of high risk pregnancy in second trimester, antepartum    -  Primary  Topics discussed:     ab precautions  genetic screening - Today we discussed genetic testing.  She is aware that the MSAFP-4 is a screening test.  A screening test is not a diagnostic test.  This means that a negative test does not guarantee an  unaffected fetus and a positive test does not mean the fetus has the condition for which the test is being performed.  If the test returns positive, a diagnostic test should be consider to determine if the fetus in fact has the condition.  After considering the options previously presented, she is still undecided if she is interested in having genetic testing performed.  GERD management  kick counts and fetal movement  PIH precautions  Anatomic scan next visit as noted.  MSAFP at that time if desired.    Relevant Orders    US Ob 14 + Weeks Single or First Gestation    Alpha Fetoprotein, Maternal    15 weeks gestation of pregnancy        Relevant Orders    US Ob 14 + Weeks Single or First Gestation    History of recurrent miscarriages      AB precautions and instructions have been given.    Nausea and vomiting during pregnancy prior to 22 weeks gestation      I discussed with the patient the various treatment options.  Patient declines any treatment at this time.  Instructions and precautions have been given.    Gastroesophageal reflux disease without esophagitis      I discussed with the patient the use of Tums or Pepcid.  Instructions and precautions were given.  Patient is to call for worsening of her symptoms.        Follow Up/Instructions:  Follow up as scheduled.  Patient was given instructions and counseling regarding her condition or for health maintenance advice. Please see specific information pulled into the AVS if appropriate.     Note: Speech recognition transcription software may have been used to dictate portions of this document.  An attempt at proofreading has been made though minor errors in transcription may still be present.    This note was electronically signed.  Paige Zaldivar M.D.

## 2021-12-17 ENCOUNTER — ROUTINE PRENATAL (OUTPATIENT)
Dept: OBSTETRICS AND GYNECOLOGY | Facility: CLINIC | Age: 31
End: 2021-12-17

## 2021-12-17 VITALS — BODY MASS INDEX: 38.11 KG/M2 | DIASTOLIC BLOOD PRESSURE: 70 MMHG | WEIGHT: 229 LBS | SYSTOLIC BLOOD PRESSURE: 112 MMHG

## 2021-12-17 DIAGNOSIS — N89.8 VAGINAL DISCHARGE: Primary | ICD-10-CM

## 2021-12-17 DIAGNOSIS — N39.0 URINARY TRACT INFECTION WITHOUT HEMATURIA, SITE UNSPECIFIED: ICD-10-CM

## 2021-12-17 PROCEDURE — 99214 OFFICE O/P EST MOD 30 MIN: CPT | Performed by: NURSE PRACTITIONER

## 2021-12-17 RX ORDER — NITROFURANTOIN 25; 75 MG/1; MG/1
100 CAPSULE ORAL 2 TIMES DAILY
Qty: 14 CAPSULE | Refills: 0 | Status: SHIPPED | OUTPATIENT
Start: 2021-12-17 | End: 2021-12-24

## 2021-12-17 NOTE — PROGRESS NOTES
[id number]  Chief Complaint   Patient presents with   • Routine Prenatal Visit      c/o vaginal discharge        HPI  Melvin is a  currently at 15w5d who today reports the following:   S/S UTI - urgency and frequency - hx of UTI  ?? Abnormal Vaginal d/c        EXAM  /70   Wt 104 kg (229 lb)   LMP 2021   BMI 38.11 kg/m²  -See Prenatal Assessment  General Appearance:  Pleasant  Lungs: Breathing unlabored  Abdomen:  See flow sheet for Fundal ht, FM, FHT's  LE: Neg edema  V/E: Not performed     Social History     Tobacco Use   • Smoking status: Former Smoker     Packs/day: 1.50     Years: 15.00     Pack years: 22.50     Types: Cigarettes, Electronic Cigarette     Quit date: 2021     Years since quittin.8   • Smokeless tobacco: Never Used   Vaping Use   • Vaping Use: Every day   • Substances: Nicotine   • Devices: Disposable   • Passive vaping exposure: Yes   Substance Use Topics   • Alcohol use: Never   • Drug use: Never         Lab Results   Component Value Date    ABO AB 10/27/2021    RH Negative 10/27/2021    ABSCRN See Final Results 10/27/2021    ABSCRN Positive (A) 10/27/2021       MDM  Impression: Pregnancy with active problem(s) &/or complication(s)   UTI  Abnormal vaginal discharge  Rh neg    Tests done today: Vaginal culture   U/A C&S  Option for AFP   Topics discussed: Macrobid escribed - may use AZO if desires  Await vaginal culture report   Rhogam at 28 wks   Flu vaccination  Nutrition reviewed   encouraged questions - call prn   Written info optional/provided:  -2nd trimester of pregnancy   Tests next visit: U/S

## 2021-12-19 LAB
BACTERIA UR CULT: NO GROWTH
BACTERIA UR CULT: NORMAL

## 2021-12-20 LAB
A VAGINAE DNA VAG QL NAA+PROBE: ABNORMAL SCORE
BVAB2 DNA VAG QL NAA+PROBE: ABNORMAL SCORE
C ALBICANS DNA VAG QL NAA+PROBE: NEGATIVE
C GLABRATA DNA VAG QL NAA+PROBE: NEGATIVE
C TRACH DNA VAG QL NAA+PROBE: NEGATIVE
MEGA1 DNA VAG QL NAA+PROBE: ABNORMAL SCORE
N GONORRHOEA DNA VAG QL NAA+PROBE: NEGATIVE
T VAGINALIS DNA VAG QL NAA+PROBE: NEGATIVE

## 2021-12-21 RX ORDER — METRONIDAZOLE 500 MG/1
500 TABLET ORAL 2 TIMES DAILY
Qty: 14 TABLET | Refills: 0 | Status: SHIPPED | OUTPATIENT
Start: 2021-12-21 | End: 2021-12-28

## 2022-01-03 ENCOUNTER — ROUTINE PRENATAL (OUTPATIENT)
Dept: OBSTETRICS AND GYNECOLOGY | Facility: CLINIC | Age: 32
End: 2022-01-03

## 2022-01-03 VITALS — DIASTOLIC BLOOD PRESSURE: 68 MMHG | SYSTOLIC BLOOD PRESSURE: 118 MMHG | WEIGHT: 235 LBS | BODY MASS INDEX: 39.11 KG/M2

## 2022-01-03 DIAGNOSIS — Z34.92 NORMAL PREGNANCY, SECOND TRIMESTER: Primary | ICD-10-CM

## 2022-01-03 PROCEDURE — 99213 OFFICE O/P EST LOW 20 MIN: CPT | Performed by: NURSE PRACTITIONER

## 2022-01-03 NOTE — PROGRESS NOTES
[id number]  Chief Complaint   Patient presents with   • Routine Prenatal Visit     Anatomy Scan, No complaints/concerns         HPI  Melvin is a  currently at 18w1d who today reports the following:     No c/o      EXAM  /68   Wt 107 kg (235 lb)   LMP 2021   BMI 39.11 kg/m²  -See Prenatal Assessment  General Appearance:  Pleasant  Lungs: Breathing unlabored  Abdomen:  See flow sheet for Fundal ht, FM, FHT's  LE: Neg edema  V/E: Not performed     Social History     Tobacco Use   • Smoking status: Former Smoker     Packs/day: 1.50     Years: 15.00     Pack years: 22.50     Types: Cigarettes, Electronic Cigarette     Quit date: 2021     Years since quittin.8   • Smokeless tobacco: Never Used   Vaping Use   • Vaping Use: Every day   • Substances: Nicotine   • Devices: Disposable   • Passive vaping exposure: Yes   Substance Use Topics   • Alcohol use: Never   • Drug use: Never         Lab Results   Component Value Date    ABO AB 10/27/2021    RH Negative 10/27/2021    ABSCRN See Final Results 10/27/2021    ABSCRN Positive (A) 10/27/2021       MDM  Impression: Supervision of normal pregnancy   Tests done today: AFP - declined    U/S - 55% growth    Topics discussed: Nutrition reviewed   encouraged questions - call prn   Written info optional/provided:  -2nd trimester of pregnancy   Tests next visit: none

## 2022-01-03 NOTE — PATIENT INSTRUCTIONS
Second Trimester of Pregnancy  The second trimester is from week 13 through week 28, months 4 through 6. The second trimester is often a time when you feel your best. Your body has also adjusted to being pregnant, and you begin to feel better physically. Usually, morning sickness has lessened or quit completely, you may have more energy, and you may have an increase in appetite. The second trimester is also a time when the fetus is growing rapidly. At the end of the sixth month, the fetus is about 9 inches long and weighs about 1½ pounds. You will likely begin to feel the baby move (quickening) between 18 and 20 weeks of the pregnancy.  BODY CHANGES  Your body goes through many changes during pregnancy. The changes vary from woman to woman.   · Your weight will continue to increase. You will notice your lower abdomen bulging out.  · You may begin to get stretch marks on your hips, abdomen, and breasts.  · You may develop headaches that can be relieved by medicines approved by your health care provider.  · You may urinate more often because the fetus is pressing on your bladder.  · You may develop or continue to have heartburn as a result of your pregnancy.  · You may develop constipation because certain hormones are causing the muscles that push waste through your intestines to slow down.  · You may develop hemorrhoids or swollen, bulging veins (varicose veins).  · You may have back pain because of the weight gain and pregnancy hormones relaxing your joints between the bones in your pelvis and as a result of a shift in weight and the muscles that support your balance.  · Your breasts will continue to grow and be tender.  · Your gums may bleed and may be sensitive to brushing and flossing.  · Dark spots or blotches (chloasma, mask of pregnancy) may develop on your face. This will likely fade after the baby is born.  · A dark line from your belly button to the pubic area (linea nigra) may appear. This will likely fade  after the baby is born.  · You may have changes in your hair. These can include thickening of your hair, rapid growth, and changes in texture. Some women also have hair loss during or after pregnancy, or hair that feels dry or thin. Your hair will most likely return to normal after your baby is born.  WHAT TO EXPECT AT YOUR PRENATAL VISITS  During a routine prenatal visit:  · You will be weighed to make sure you and the fetus are growing normally.  · Your blood pressure will be taken.  · Your abdomen will be measured to track your baby's growth.  · The fetal heartbeat will be listened to.  · Any test results from the previous visit will be discussed.  Your health care provider may ask you:  · How you are feeling.  · If you are feeling the baby move.  · If you have had any abnormal symptoms, such as leaking fluid, bleeding, severe headaches, or abdominal cramping.  · If you are using any tobacco products, including cigarettes, chewing tobacco, and electronic cigarettes.  · If you have any questions.  Other tests that may be performed during your second trimester include:  · Blood tests that check for:  ¨ Low iron levels (anemia).  ¨ Gestational diabetes (between 24 and 28 weeks).  ¨ Rh antibodies.  · Urine tests to check for infections, diabetes, or protein in the urine.  · An ultrasound to confirm the proper growth and development of the baby.  · An amniocentesis to check for possible genetic problems.  · Fetal screens for spina bifida and Down syndrome.  · HIV (human immunodeficiency virus) testing. Routine prenatal testing includes screening for HIV, unless you choose not to have this test.  HOME CARE INSTRUCTIONS   · Avoid all smoking, herbs, alcohol, and unprescribed drugs. These chemicals affect the formation and growth of the baby.  · Do not use any tobacco products, including cigarettes, chewing tobacco, and electronic cigarettes. If you need help quitting, ask your health care provider. You may receive  counseling support and other resources to help you quit.  · Follow your health care provider's instructions regarding medicine use. There are medicines that are either safe or unsafe to take during pregnancy.  · Exercise only as directed by your health care provider. Experiencing uterine cramps is a good sign to stop exercising.  · Continue to eat regular, healthy meals.  · Wear a good support bra for breast tenderness.  · Do not use hot tubs, steam rooms, or saunas.  · Wear your seat belt at all times when driving.  · Avoid raw meat, uncooked cheese, cat litter boxes, and soil used by cats. These carry germs that can cause birth defects in the baby.  · Take your prenatal vitamins.  · Take 2786-1714 mg of calcium daily starting at the 20th week of pregnancy until you deliver your baby.  · Try taking a stool softener (if your health care provider approves) if you develop constipation. Eat more high-fiber foods, such as fresh vegetables or fruit and whole grains. Drink plenty of fluids to keep your urine clear or pale yellow.  · Take warm sitz baths to soothe any pain or discomfort caused by hemorrhoids. Use hemorrhoid cream if your health care provider approves.  · If you develop varicose veins, wear support hose. Elevate your feet for 15 minutes, 3-4 times a day. Limit salt in your diet.  · Avoid heavy lifting, wear low heel shoes, and practice good posture.  · Rest with your legs elevated if you have leg cramps or low back pain.  · Visit your dentist if you have not gone yet during your pregnancy. Use a soft toothbrush to brush your teeth and be gentle when you floss.  · A sexual relationship may be continued unless your health care provider directs you otherwise.  · Continue to go to all your prenatal visits as directed by your health care provider.  SEEK MEDICAL CARE IF:   · You have dizziness.  · You have mild pelvic cramps, pelvic pressure, or nagging pain in the abdominal area.  · You have persistent nausea,  vomiting, or diarrhea.  · You have a bad smelling vaginal discharge.  · You have pain with urination.  SEEK IMMEDIATE MEDICAL CARE IF:   · You have a fever.  · You are leaking fluid from your vagina.  · You have spotting or bleeding from your vagina.  · You have severe abdominal cramping or pain.  · You have rapid weight gain or loss.  · You have shortness of breath with chest pain.  · You notice sudden or extreme swelling of your face, hands, ankles, feet, or legs.  · You have not felt your baby move in over an hour.  · You have severe headaches that do not go away with medicine.  · You have vision changes.     This information is not intended to replace advice given to you by your health care provider. Make sure you discuss any questions you have with your health care provider.

## 2022-02-02 ENCOUNTER — ROUTINE PRENATAL (OUTPATIENT)
Dept: OBSTETRICS AND GYNECOLOGY | Facility: CLINIC | Age: 32
End: 2022-02-02

## 2022-02-02 VITALS — BODY MASS INDEX: 40.27 KG/M2 | WEIGHT: 242 LBS | DIASTOLIC BLOOD PRESSURE: 74 MMHG | SYSTOLIC BLOOD PRESSURE: 116 MMHG

## 2022-02-02 DIAGNOSIS — Z34.92 PRENATAL CARE IN SECOND TRIMESTER: Primary | ICD-10-CM

## 2022-02-02 PROCEDURE — 99213 OFFICE O/P EST LOW 20 MIN: CPT | Performed by: OBSTETRICS & GYNECOLOGY

## 2022-02-02 RX ORDER — ALBUTEROL SULFATE 2.5 MG/3ML
2.5 SOLUTION RESPIRATORY (INHALATION) EVERY 4 HOURS PRN
Status: ON HOLD | COMMUNITY
End: 2022-04-28

## 2022-02-02 NOTE — PROGRESS NOTES
Prenatal Care Visit    Subjective   Chief Complaint   Patient presents with   • Routine Prenatal Visit     having difficulties catching breath, request refill (Albuterol) on inhaler she used lst year when + Covid       History:   Melvin is a  currently at 22w3d who presents for a prenatal care visit today.    No major issues.    Social History    Tobacco Use      Smoking status: Former Smoker        Packs/day: 1.50        Years: 15.00        Pack years: 22.5        Types: Cigarettes, Electronic Cigarette        Quit date: 2021        Years since quittin.9      Smokeless tobacco: Never Used       Objective   /74   Wt 110 kg (242 lb)   LMP 2021   BMI 40.27 kg/m²   Physical Exam:  Normal, gestational age-appropriate exam today        Plan   Medical Decision Making:    I have reviewed the prenatal labs and ultrasound(s) today. I have reviewed the most recent prenatal progress note(s).    Diagnosis: Supervision of low risk pregnancy   Asthma   Tests/Orders/Rx today: No orders of the defined types were placed in this encounter.      Medication Management: refill Albuterol inhaler     Topics discussed: Prenatal care milestones  Asthma   Weight + Diet   Tests next visit: GCT  HgB   Next visit: 4 week(s)     Ramirez Flores MD  Obstetrics and Gynecology  [facility]

## 2022-03-02 ENCOUNTER — ROUTINE PRENATAL (OUTPATIENT)
Dept: OBSTETRICS AND GYNECOLOGY | Facility: CLINIC | Age: 32
End: 2022-03-02

## 2022-03-02 VITALS — WEIGHT: 247 LBS | SYSTOLIC BLOOD PRESSURE: 132 MMHG | BODY MASS INDEX: 41.1 KG/M2 | DIASTOLIC BLOOD PRESSURE: 70 MMHG

## 2022-03-02 DIAGNOSIS — Z13.1 DIABETES MELLITUS SCREENING: ICD-10-CM

## 2022-03-02 DIAGNOSIS — Z34.92 PRENATAL CARE IN SECOND TRIMESTER: Primary | ICD-10-CM

## 2022-03-02 LAB
BASOPHILS # BLD AUTO: 0.03 10*3/MM3 (ref 0–0.2)
BASOPHILS NFR BLD AUTO: 0.3 % (ref 0–1.5)
EOSINOPHIL # BLD AUTO: 0.08 10*3/MM3 (ref 0–0.4)
EOSINOPHIL NFR BLD AUTO: 0.8 % (ref 0.3–6.2)
ERYTHROCYTE [DISTWIDTH] IN BLOOD BY AUTOMATED COUNT: 12.9 % (ref 12.3–15.4)
GLUCOSE 1H P 50 G GLC PO SERPL-MCNC: 177 MG/DL (ref 65–139)
HCT VFR BLD AUTO: 33.4 % (ref 34–46.6)
HGB BLD-MCNC: 11 G/DL (ref 12–15.9)
IMM GRANULOCYTES # BLD AUTO: 0.04 10*3/MM3 (ref 0–0.05)
IMM GRANULOCYTES NFR BLD AUTO: 0.4 % (ref 0–0.5)
LYMPHOCYTES # BLD AUTO: 1.52 10*3/MM3 (ref 0.7–3.1)
LYMPHOCYTES NFR BLD AUTO: 15.9 % (ref 19.6–45.3)
MCH RBC QN AUTO: 29.7 PG (ref 26.6–33)
MCHC RBC AUTO-ENTMCNC: 32.9 G/DL (ref 31.5–35.7)
MCV RBC AUTO: 90.3 FL (ref 79–97)
MONOCYTES # BLD AUTO: 0.5 10*3/MM3 (ref 0.1–0.9)
MONOCYTES NFR BLD AUTO: 5.2 % (ref 5–12)
NEUTROPHILS # BLD AUTO: 7.36 10*3/MM3 (ref 1.7–7)
NEUTROPHILS NFR BLD AUTO: 77.4 % (ref 42.7–76)
NRBC BLD AUTO-RTO: 0 /100 WBC (ref 0–0.2)
PLATELET # BLD AUTO: 210 10*3/MM3 (ref 140–450)
RBC # BLD AUTO: 3.7 10*6/MM3 (ref 3.77–5.28)
WBC # BLD AUTO: 9.53 10*3/MM3 (ref 3.4–10.8)

## 2022-03-02 PROCEDURE — 99213 OFFICE O/P EST LOW 20 MIN: CPT | Performed by: OBSTETRICS & GYNECOLOGY

## 2022-03-02 NOTE — PROGRESS NOTES
Prenatal Care Visit    Subjective   Chief Complaint   Patient presents with   • Routine Prenatal Visit     Glucola done today, no complaints.       History:   Melvin is a  currently at 26w3d who presents for a prenatal care visit today.    No major issues.    Social History    Tobacco Use      Smoking status: Former Smoker        Packs/day: 1.50        Years: 15.00        Pack years: 22.5        Types: Cigarettes, Electronic Cigarette        Quit date: 2021        Years since quittin.0      Smokeless tobacco: Never Used       Objective   /70   Wt 112 kg (247 lb)   LMP 2021   BMI 41.10 kg/m²   Physical Exam:  Normal, gestational age-appropriate exam today        Plan   Medical Decision Making:    I have reviewed the prenatal labs and ultrasound(s) today. I have reviewed the most recent prenatal progress note(s).    Diagnosis: Supervision of low risk pregnancy   Asthma   Tests/Orders/Rx today: Orders Placed This Encounter   Procedures   • Gestational Screen 1 Hr ([facility])     Order Specific Question:   Release to patient     Answer:   Immediate   • CBC & Differential     Order Specific Question:   Manual Differential     Answer:   No       Medication Management: refill Albuterol inhaler     Topics discussed: Prenatal care milestones  No tubal   Weight   Tests next visit: none   Next visit: 4 week(s)     Ramirez Flores MD  Obstetrics and Gynecology  [facility]

## 2022-03-05 DIAGNOSIS — D50.9 IRON DEFICIENCY ANEMIA DURING PREGNANCY: Primary | ICD-10-CM

## 2022-03-05 DIAGNOSIS — O99.019 IRON DEFICIENCY ANEMIA DURING PREGNANCY: Primary | ICD-10-CM

## 2022-03-05 RX ORDER — FERROUS SULFATE 325(65) MG
325 TABLET ORAL
Qty: 60 TABLET | Refills: 6 | Status: SHIPPED | OUTPATIENT
Start: 2022-03-05 | End: 2022-09-20

## 2022-03-30 DIAGNOSIS — R73.09 ABNORMAL GTT (GLUCOSE TOLERANCE TEST): Primary | ICD-10-CM

## 2022-03-30 LAB
GLUCOSE 1H P 100 G GLC PO SERPL-MCNC: 212 MG/DL (ref 65–179)
GLUCOSE 2H P 100 G GLC PO SERPL-MCNC: 197 MG/DL (ref 65–154)
GLUCOSE 3H P 100 G GLC PO SERPL-MCNC: 85 MG/DL (ref 65–139)
GLUCOSE P FAST SERPL-MCNC: 111 MG/DL (ref 65–94)

## 2022-03-31 DIAGNOSIS — O24.410 GDM (GESTATIONAL DIABETES MELLITUS), CLASS A1: Primary | ICD-10-CM

## 2022-03-31 RX ORDER — LANCETS
EACH MISCELLANEOUS
Qty: 100 EACH | Refills: 12 | Status: SHIPPED | OUTPATIENT
Start: 2022-03-31 | End: 2022-09-20

## 2022-03-31 RX ORDER — BLOOD-GLUCOSE METER
1 KIT MISCELLANEOUS AS NEEDED
Qty: 1 EACH | Refills: 0 | Status: SHIPPED | OUTPATIENT
Start: 2022-03-31 | End: 2022-09-20

## 2022-04-01 ENCOUNTER — ROUTINE PRENATAL (OUTPATIENT)
Dept: OBSTETRICS AND GYNECOLOGY | Facility: CLINIC | Age: 32
End: 2022-04-01

## 2022-04-01 VITALS — WEIGHT: 250 LBS | SYSTOLIC BLOOD PRESSURE: 122 MMHG | DIASTOLIC BLOOD PRESSURE: 62 MMHG | BODY MASS INDEX: 41.6 KG/M2

## 2022-04-01 DIAGNOSIS — Z34.93 PRENATAL CARE IN THIRD TRIMESTER: Primary | ICD-10-CM

## 2022-04-01 DIAGNOSIS — J45.21 MILD INTERMITTENT ASTHMA WITH EXACERBATION: ICD-10-CM

## 2022-04-01 DIAGNOSIS — O24.410 GDM (GESTATIONAL DIABETES MELLITUS), CLASS A1: ICD-10-CM

## 2022-04-01 PROCEDURE — 99213 OFFICE O/P EST LOW 20 MIN: CPT | Performed by: OBSTETRICS & GYNECOLOGY

## 2022-04-01 RX ORDER — BLOOD-GLUCOSE METER
EACH MISCELLANEOUS
COMMUNITY
Start: 2022-03-31 | End: 2022-09-20

## 2022-04-07 PROBLEM — J45.21 MILD INTERMITTENT ASTHMA WITH EXACERBATION: Status: ACTIVE | Noted: 2022-04-07

## 2022-04-07 PROBLEM — O24.410 GDM (GESTATIONAL DIABETES MELLITUS), CLASS A1: Status: ACTIVE | Noted: 2022-04-07

## 2022-04-07 NOTE — PROGRESS NOTES
Prenatal Care Visit    Subjective   Chief Complaint   Patient presents with   • Routine Prenatal Visit     Notified patient of 3 hour results       History:   Melvin is a  currently at 30w5d who presents for a prenatal care visit today.    She failed her 3-hour gtt recently.    Social History    Tobacco Use      Smoking status: Former Smoker        Packs/day: 1.50        Years: 15.00        Pack years: 22.5        Types: Cigarettes, Electronic Cigarette        Quit date: 2021        Years since quittin.1      Smokeless tobacco: Never Used       Objective   /62   Wt 113 kg (250 lb)   LMP 2021   BMI 41.60 kg/m²   Physical Exam:  Normal, gestational age-appropriate exam today        Plan   Medical Decision Making:    I have reviewed the prenatal labs and ultrasound(s) today. I have reviewed the most recent prenatal progress note(s).    Diagnosis: Supervision of high risk pregnancy  DM - GDMA1   Asthma   Tests/Orders/Rx today: No orders of the defined types were placed in this encounter.      Medication Management: none     Topics discussed: Prenatal care milestones  We reviewed her 3-hour GTT results.  We discussed gestational diabetes in detail today.  Supplies have already been called in and she will pick those up today.  She will check glucose values for 1 week and return for lab review.  Diet discussed.    Tests next visit: none   Next visit: 1 week(s)     Ramirez Flores MD  Obstetrics and Gynecology  [facility]

## 2022-04-11 ENCOUNTER — ROUTINE PRENATAL (OUTPATIENT)
Dept: OBSTETRICS AND GYNECOLOGY | Facility: CLINIC | Age: 32
End: 2022-04-11

## 2022-04-11 VITALS — SYSTOLIC BLOOD PRESSURE: 134 MMHG | DIASTOLIC BLOOD PRESSURE: 68 MMHG | WEIGHT: 250.6 LBS | BODY MASS INDEX: 41.7 KG/M2

## 2022-04-11 DIAGNOSIS — Z67.91 RH NEGATIVE STATUS DURING PREGNANCY IN THIRD TRIMESTER: Primary | ICD-10-CM

## 2022-04-11 DIAGNOSIS — O26.893 RH NEGATIVE STATUS DURING PREGNANCY IN THIRD TRIMESTER: Primary | ICD-10-CM

## 2022-04-11 DIAGNOSIS — Z36.89 ENCOUNTER FOR ULTRASOUND TO ASSESS FETAL GROWTH: ICD-10-CM

## 2022-04-11 DIAGNOSIS — O24.419 GDM, CLASS A2: ICD-10-CM

## 2022-04-11 PROCEDURE — 99213 OFFICE O/P EST LOW 20 MIN: CPT | Performed by: OBSTETRICS & GYNECOLOGY

## 2022-04-11 PROCEDURE — 96372 THER/PROPH/DIAG INJ SC/IM: CPT | Performed by: OBSTETRICS & GYNECOLOGY

## 2022-04-11 RX ORDER — GLYBURIDE 2.5 MG/1
TABLET ORAL
Qty: 30 TABLET | Refills: 2 | Status: SHIPPED | OUTPATIENT
Start: 2022-04-11 | End: 2022-04-25 | Stop reason: SDUPTHER

## 2022-04-11 NOTE — ADDENDUM NOTE
Addended by: SAPPHIRE FELDMAN on: 4/11/2022 11:54 AM     Modules accepted: Orders     left upper arm

## 2022-04-15 ENCOUNTER — HOSPITAL ENCOUNTER (OUTPATIENT)
Facility: HOSPITAL | Age: 32
Discharge: HOME | End: 2022-04-15
Attending: OBSTETRICS & GYNECOLOGY | Admitting: OBSTETRICS & GYNECOLOGY

## 2022-04-15 VITALS
OXYGEN SATURATION: 100 % | HEART RATE: 89 BPM | HEIGHT: 65 IN | BODY MASS INDEX: 41.94 KG/M2 | TEMPERATURE: 97.6 F | WEIGHT: 251.7 LBS | DIASTOLIC BLOOD PRESSURE: 62 MMHG | SYSTOLIC BLOOD PRESSURE: 120 MMHG

## 2022-04-15 LAB
BACTERIA UR QL AUTO: ABNORMAL /HPF
BILIRUB BLD-MCNC: NEGATIVE MG/DL
BILIRUB UR QL STRIP: NEGATIVE
CLARITY UR: CLEAR
CLARITY, POC: CLEAR
COLOR UR: ABNORMAL
COLOR UR: YELLOW
GLUCOSE UR STRIP-MCNC: NEGATIVE MG/DL
GLUCOSE UR STRIP-MCNC: NEGATIVE MG/DL
HGB UR QL STRIP.AUTO: ABNORMAL
HYALINE CASTS UR QL AUTO: ABNORMAL /LPF
KETONES UR QL STRIP: ABNORMAL
KETONES UR QL: NEGATIVE
LEUKOCYTE EST, POC: NEGATIVE
LEUKOCYTE ESTERASE UR QL STRIP.AUTO: ABNORMAL
NITRITE UR QL STRIP: NEGATIVE
NITRITE UR-MCNC: NEGATIVE MG/ML
PH UR STRIP.AUTO: 6.5 [PH] (ref 5–8)
PH UR: 6.5 [PH] (ref 5–8)
PROT UR QL STRIP: ABNORMAL
PROT UR STRIP-MCNC: ABNORMAL MG/DL
RBC # UR STRIP: ABNORMAL /HPF
RBC # UR STRIP: ABNORMAL /UL
REF LAB TEST METHOD: ABNORMAL
SP GR UR STRIP: 1.02 (ref 1–1.03)
SP GR UR: 1.02 (ref 1–1.03)
SQUAMOUS #/AREA URNS HPF: ABNORMAL /HPF
UROBILINOGEN UR QL STRIP: ABNORMAL
UROBILINOGEN UR QL: NORMAL
WBC # UR STRIP: ABNORMAL /HPF

## 2022-04-15 PROCEDURE — 87086 URINE CULTURE/COLONY COUNT: CPT | Performed by: OBSTETRICS & GYNECOLOGY

## 2022-04-15 PROCEDURE — 81002 URINALYSIS NONAUTO W/O SCOPE: CPT | Performed by: OBSTETRICS & GYNECOLOGY

## 2022-04-15 PROCEDURE — 59025 FETAL NON-STRESS TEST: CPT

## 2022-04-15 PROCEDURE — 81001 URINALYSIS AUTO W/SCOPE: CPT | Performed by: OBSTETRICS & GYNECOLOGY

## 2022-04-15 PROCEDURE — G0463 HOSPITAL OUTPT CLINIC VISIT: HCPCS

## 2022-04-15 NOTE — NON STRESS TEST
Triage Note - Nursing Documentation  Labor and Delivery Admission Log    Melvin Jarvis  : 1990  MRN: [id number]  CSN: [id number]    Date in / Time in:  4/15/2022  Time in: 1209    Date out / Time out:    Time out: 1309  Nurse: Regina Pittman, RN    Patient Info: She is a 31 y.o. year old  at 32w5d with an DAVID of 2022, by Ultrasound who was seen on the [facility].    Chief Complaint:   Chief Complaint   Patient presents with   • Non-stress Test     Scheduled due to Gest. Diabetes.       Provider Instructions / Disposition: EFM monitoring. PO hydration. Urine dip. UA. Urine culture.  NST reactive. Patient d/c'd to home with  and fetal movement take home instructions.    Patient Active Problem List   Diagnosis   • GDM (gestational diabetes mellitus), class A1   • Mild intermittent asthma with exacerbation       NST Documentation (Only applicable > 32 weeks): Interpretation A  Nonstress Test Interpretation A: Reactive (04/15/22 1300 : Regina Pittman, RN)

## 2022-04-15 NOTE — DISCHARGE INSTRUCTIONS
Return to [facility] with any problems or concern.    Drink at least 8-10 glasses of water per day.    Keep all scheduled OB appointments.

## 2022-04-15 NOTE — NURSING NOTE
This RN called and spoke with Dr. Davis regarding patient admission to [facility] and NST results; telephone orders received to discharge patient to home with  and fetal movement take home instructions; R/V.

## 2022-04-16 LAB — BACTERIA SPEC AEROBE CULT: NO GROWTH

## 2022-04-18 ENCOUNTER — ROUTINE PRENATAL (OUTPATIENT)
Dept: OBSTETRICS AND GYNECOLOGY | Facility: CLINIC | Age: 32
End: 2022-04-18

## 2022-04-18 ENCOUNTER — APPOINTMENT (OUTPATIENT)
Dept: NUTRITION | Facility: HOSPITAL | Age: 32
End: 2022-04-18

## 2022-04-18 VITALS — SYSTOLIC BLOOD PRESSURE: 144 MMHG | BODY MASS INDEX: 41.93 KG/M2 | WEIGHT: 252 LBS | DIASTOLIC BLOOD PRESSURE: 64 MMHG

## 2022-04-18 DIAGNOSIS — O24.419 GDM, CLASS A2: Primary | ICD-10-CM

## 2022-04-18 PROCEDURE — 99213 OFFICE O/P EST LOW 20 MIN: CPT | Performed by: OBSTETRICS & GYNECOLOGY

## 2022-04-18 NOTE — PROGRESS NOTES
Chief Complaint   Patient presents with   • Routine Prenatal Visit     Routine visit, BPP done today. No problems or concerns. Had urine done on [facility] on Friday        HPI:   , 33w1d gestation reports doing well    ROS:  See Prenatal Episode/Flowsheet  /64   Wt 114 kg (252 lb)   LMP 2021   BMI 41.93 kg/m²      EXAM:  EXTREMITIES:  No swelling-See Prenatal Episode/Flowsheet    ABDOMEN:  FHTs/Movement noted-See Prenatal Episode/Flowsheet    URINE GLUCOSE/PROTEIN:  See Prenatal Episode/Flowsheet    PELVIC EXAM:  See Prenatal Episode/Flowsheet  CV:  Lungs:  GYN:    MDM:    Lab Results   Component Value Date    HGB 11.0 (L) 2022    RUBELLAABIGG <0.90 (L) 10/27/2021    HEPBSAG Negative 10/27/2021    ABO AB 10/27/2021    RH Negative 10/27/2021    ABSCRN See Final Results 10/27/2021    ABSCRN Positive (A) 10/27/2021    [id number] Non Reactive 10/27/2021    HEPCVIRUSABY <0.1 10/27/2021    [id number] 212 (H) 2022    URINECX No growth 04/15/2022       U/S:BPP 8/8, TIMMY 12, Vertex    1. IUP 33w1d  2. Routine care   3. A2 GDM: started on glyburide 2.5mg po qhs last week. fastings haven't really changed, will increase to 5mg po qhs

## 2022-04-21 ENCOUNTER — HOSPITAL ENCOUNTER (OUTPATIENT)
Facility: HOSPITAL | Age: 32
Discharge: HOME | End: 2022-04-21
Attending: OBSTETRICS & GYNECOLOGY | Admitting: OBSTETRICS & GYNECOLOGY

## 2022-04-21 VITALS — WEIGHT: 252.9 LBS | TEMPERATURE: 98 F | RESPIRATION RATE: 18 BRPM | HEIGHT: 65 IN | BODY MASS INDEX: 42.13 KG/M2

## 2022-04-21 LAB
BILIRUB BLD-MCNC: NEGATIVE MG/DL
CLARITY, POC: CLEAR
COLOR UR: YELLOW
GLUCOSE UR STRIP-MCNC: NEGATIVE MG/DL
KETONES UR QL: ABNORMAL
LEUKOCYTE EST, POC: NEGATIVE
NITRITE UR-MCNC: NEGATIVE MG/ML
PH UR: 6.5 [PH] (ref 5–8)
PROT UR STRIP-MCNC: ABNORMAL MG/DL
RBC # UR STRIP: NEGATIVE /UL
SP GR UR: 1.01 (ref 1–1.03)
UROBILINOGEN UR QL: NORMAL

## 2022-04-21 PROCEDURE — 59025 FETAL NON-STRESS TEST: CPT | Performed by: MIDWIFE

## 2022-04-21 PROCEDURE — 59025 FETAL NON-STRESS TEST: CPT

## 2022-04-21 PROCEDURE — 81002 URINALYSIS NONAUTO W/O SCOPE: CPT | Performed by: OBSTETRICS & GYNECOLOGY

## 2022-04-21 NOTE — NON STRESS TEST
Triage Note - Nursing Documentation  Labor and Delivery Admission Log    Melvin Jarvis  : 1990  MRN: [id number]  CSN: [id number]    Date in / Time in:  2022  Time in: 1310    Date out / Time out:    Time out: 1345    Nurse: Steffany Griffin, RN    Patient Info: She is a 31 y.o. year old  at 33w4d with an DAVID of 2022, by Ultrasound who was seen on the [facility].    Chief Complaint:   Chief Complaint   Patient presents with   • Non-stress Test     Gestational diabetes       Provider Instructions / Disposition: Monitored and po hydrated NST reactive 1+ edema will schedule NST after Monday's office appointment    Patient Active Problem List   Diagnosis   • GDM (gestational diabetes mellitus), class A1   • Mild intermittent asthma with exacerbation       NST Documentation (Only applicable > 32 weeks): Interpretation A  Nonstress Test Interpretation A: Reactive (22 1350 : Steffany Griffin, RN)

## 2022-04-25 ENCOUNTER — ROUTINE PRENATAL (OUTPATIENT)
Dept: OBSTETRICS AND GYNECOLOGY | Facility: CLINIC | Age: 32
End: 2022-04-25

## 2022-04-25 VITALS — SYSTOLIC BLOOD PRESSURE: 130 MMHG | BODY MASS INDEX: 42.27 KG/M2 | WEIGHT: 254 LBS | DIASTOLIC BLOOD PRESSURE: 70 MMHG

## 2022-04-25 DIAGNOSIS — Z34.93 PRENATAL CARE IN THIRD TRIMESTER: Primary | ICD-10-CM

## 2022-04-25 DIAGNOSIS — J45.21 MILD INTERMITTENT ASTHMA WITH EXACERBATION: ICD-10-CM

## 2022-04-25 DIAGNOSIS — O24.419 GESTATIONAL DIABETES MELLITUS, CLASS A2: ICD-10-CM

## 2022-04-25 PROBLEM — O24.410 GDM (GESTATIONAL DIABETES MELLITUS), CLASS A1: Status: RESOLVED | Noted: 2022-04-07 | Resolved: 2022-04-25

## 2022-04-25 PROCEDURE — 99214 OFFICE O/P EST MOD 30 MIN: CPT | Performed by: OBSTETRICS & GYNECOLOGY

## 2022-04-25 RX ORDER — GLYBURIDE 5 MG/1
7.5 TABLET ORAL NIGHTLY
Qty: 45 TABLET | Refills: 5 | Status: SHIPPED | OUTPATIENT
Start: 2022-04-25 | End: 2022-05-18 | Stop reason: HOSPADM

## 2022-04-25 NOTE — PROGRESS NOTES
Prenatal Care Visit    Subjective   Chief Complaint   Patient presents with   • Routine Prenatal Visit     Prenatal visit with BPP done today. Swelling       History:   Melvin is a  currently at 34w1d who presents for a prenatal care visit today.    Fasting glucose values are elevated.    Social History    Tobacco Use      Smoking status: Former Smoker        Packs/day: 1.50        Years: 15.00        Pack years: 22.5        Types: Cigarettes, Electronic Cigarette        Quit date: 2021        Years since quittin.1      Smokeless tobacco: Never Used       Objective   /70   Wt 115 kg (254 lb)   LMP 2021   BMI 42.27 kg/m²   Physical Exam:  Normal, gestational age-appropriate exam today        Plan   Medical Decision Making:    I have reviewed the prenatal labs and ultrasound(s) today. I have reviewed the most recent prenatal progress note(s).    Diagnosis: Supervision of high risk pregnancy  DM - GDMA2   Asthma   Tests/Orders/Rx today: No orders of the defined types were placed in this encounter.      Medication Management: Glyburide 7.5 mg nightly     Topics discussed: Prenatal care milestones  glucose management   Increase Glyburide to 7.5 mg nightly    Tests next visit: BPP  NST   Next visit: 1 week(s)     Ramirez Flores MD  Obstetrics and Gynecology  [facility]

## 2022-04-28 ENCOUNTER — HOSPITAL ENCOUNTER (OUTPATIENT)
Facility: HOSPITAL | Age: 32
Discharge: HOME | End: 2022-04-28
Attending: MIDWIFE | Admitting: MIDWIFE

## 2022-04-28 VITALS
BODY MASS INDEX: 42.32 KG/M2 | TEMPERATURE: 98 F | HEIGHT: 65 IN | OXYGEN SATURATION: 97 % | HEART RATE: 92 BPM | SYSTOLIC BLOOD PRESSURE: 117 MMHG | RESPIRATION RATE: 16 BRPM | WEIGHT: 254 LBS | DIASTOLIC BLOOD PRESSURE: 62 MMHG

## 2022-04-28 LAB
BILIRUB BLD-MCNC: NEGATIVE MG/DL
CLARITY, POC: CLEAR
COLOR UR: ABNORMAL
GLUCOSE UR STRIP-MCNC: NEGATIVE MG/DL
KETONES UR QL: NEGATIVE
LEUKOCYTE EST, POC: NEGATIVE
NITRITE UR-MCNC: NEGATIVE MG/ML
PH UR: 6.5 [PH] (ref 5–8)
PROT UR STRIP-MCNC: ABNORMAL MG/DL
RBC # UR STRIP: ABNORMAL /UL
SP GR UR: 1.02 (ref 1–1.03)
UROBILINOGEN UR QL: NORMAL

## 2022-04-28 PROCEDURE — 81002 URINALYSIS NONAUTO W/O SCOPE: CPT | Performed by: MIDWIFE

## 2022-04-28 PROCEDURE — 59025 FETAL NON-STRESS TEST: CPT

## 2022-04-28 PROCEDURE — 59025 FETAL NON-STRESS TEST: CPT | Performed by: MIDWIFE

## 2022-04-28 PROCEDURE — G0463 HOSPITAL OUTPT CLINIC VISIT: HCPCS

## 2022-05-02 ENCOUNTER — ROUTINE PRENATAL (OUTPATIENT)
Dept: OBSTETRICS AND GYNECOLOGY | Facility: CLINIC | Age: 32
End: 2022-05-02

## 2022-05-02 VITALS — BODY MASS INDEX: 42.1 KG/M2 | DIASTOLIC BLOOD PRESSURE: 80 MMHG | WEIGHT: 253 LBS | SYSTOLIC BLOOD PRESSURE: 158 MMHG

## 2022-05-02 DIAGNOSIS — J45.21 MILD INTERMITTENT ASTHMA WITH EXACERBATION: ICD-10-CM

## 2022-05-02 DIAGNOSIS — O24.419 GESTATIONAL DIABETES MELLITUS (GDM) IN THIRD TRIMESTER, GESTATIONAL DIABETES METHOD OF CONTROL UNSPECIFIED: ICD-10-CM

## 2022-05-02 DIAGNOSIS — O13.3 GESTATIONAL HTN, THIRD TRIMESTER: ICD-10-CM

## 2022-05-02 DIAGNOSIS — O24.419 GESTATIONAL DIABETES MELLITUS, CLASS A2: ICD-10-CM

## 2022-05-02 DIAGNOSIS — Z34.93 PRENATAL CARE IN THIRD TRIMESTER: Primary | ICD-10-CM

## 2022-05-02 LAB
ALBUMIN SERPL-MCNC: 3.6 G/DL (ref 3.5–5.2)
ALBUMIN/GLOB SERPL: 1.5 G/DL
ALP SERPL-CCNC: 80 U/L (ref 39–117)
ALT SERPL-CCNC: 15 U/L (ref 1–33)
AST SERPL-CCNC: 18 U/L (ref 1–32)
BASOPHILS # BLD AUTO: 0.02 10*3/MM3 (ref 0–0.2)
BASOPHILS NFR BLD AUTO: 0.2 % (ref 0–1.5)
BILIRUB SERPL-MCNC: 0.3 MG/DL (ref 0–1.2)
BUN SERPL-MCNC: 6 MG/DL (ref 6–20)
BUN/CREAT SERPL: 12.2 (ref 7–25)
CALCIUM SERPL-MCNC: 8.8 MG/DL (ref 8.6–10.5)
CHLORIDE SERPL-SCNC: 106 MMOL/L (ref 98–107)
CO2 SERPL-SCNC: 19 MMOL/L (ref 22–29)
CREAT SERPL-MCNC: 0.49 MG/DL (ref 0.57–1)
CREAT UR-MCNC: 69.9 MG/DL
EGFRCR SERPLBLD CKD-EPI 2021: 129.4 ML/MIN/1.73
EOSINOPHIL # BLD AUTO: 0.07 10*3/MM3 (ref 0–0.4)
EOSINOPHIL NFR BLD AUTO: 0.7 % (ref 0.3–6.2)
ERYTHROCYTE [DISTWIDTH] IN BLOOD BY AUTOMATED COUNT: 13.7 % (ref 12.3–15.4)
GLOBULIN SER CALC-MCNC: 2.4 GM/DL
GLUCOSE SERPL-MCNC: 109 MG/DL (ref 65–99)
HCT VFR BLD AUTO: 36.8 % (ref 34–46.6)
HGB BLD-MCNC: 12.4 G/DL (ref 12–15.9)
IMM GRANULOCYTES # BLD AUTO: 0.04 10*3/MM3 (ref 0–0.05)
IMM GRANULOCYTES NFR BLD AUTO: 0.4 % (ref 0–0.5)
LYMPHOCYTES # BLD AUTO: 1.53 10*3/MM3 (ref 0.7–3.1)
LYMPHOCYTES NFR BLD AUTO: 15 % (ref 19.6–45.3)
MCH RBC QN AUTO: 29.5 PG (ref 26.6–33)
MCHC RBC AUTO-ENTMCNC: 33.7 G/DL (ref 31.5–35.7)
MCV RBC AUTO: 87.4 FL (ref 79–97)
MONOCYTES # BLD AUTO: 0.68 10*3/MM3 (ref 0.1–0.9)
MONOCYTES NFR BLD AUTO: 6.7 % (ref 5–12)
NEUTROPHILS # BLD AUTO: 7.83 10*3/MM3 (ref 1.7–7)
NEUTROPHILS NFR BLD AUTO: 77 % (ref 42.7–76)
NRBC BLD AUTO-RTO: 0 /100 WBC (ref 0–0.2)
PLATELET # BLD AUTO: 163 10*3/MM3 (ref 140–450)
POTASSIUM SERPL-SCNC: 3.8 MMOL/L (ref 3.5–5.2)
PROT SERPL-MCNC: 6 G/DL (ref 6–8.5)
PROT UR-MCNC: 19.6 MG/DL
PROT/CREAT UR: 280.4 MG/G CREA (ref 0–200)
RBC # BLD AUTO: 4.21 10*6/MM3 (ref 3.77–5.28)
SODIUM SERPL-SCNC: 139 MMOL/L (ref 136–145)
WBC # BLD AUTO: 10.17 10*3/MM3 (ref 3.4–10.8)

## 2022-05-02 PROCEDURE — 99214 OFFICE O/P EST MOD 30 MIN: CPT | Performed by: OBSTETRICS & GYNECOLOGY

## 2022-05-02 NOTE — PROGRESS NOTES
Prenatal Care Visit    Subjective   Chief Complaint   Patient presents with   • Routine Prenatal Visit     BPP done today, swelling.       History:   Melvin is a  currently at 35w1d who presents for a prenatal care visit today.    FSBG values are better. Swelling is worsening. No headaches or vision changes.    Social History    Tobacco Use      Smoking status: Former Smoker        Packs/day: 1.50        Years: 15.00        Pack years: 22.5        Types: Cigarettes, Electronic Cigarette        Quit date: 2021        Years since quittin.1      Smokeless tobacco: Never Used       Objective   /80   Wt 115 kg (253 lb)   LMP 2021   BMI 42.10 kg/m²   Physical Exam:  Normal, gestational age-appropriate exam today        Plan   Medical Decision Making:    I have reviewed the prenatal labs and ultrasound(s) today. I have reviewed the most recent prenatal progress note(s).    Diagnosis: Supervision of high risk pregnancy  DM - GDMA2   Gestational HTN, at least  Asthma  LGA  BMI 42   Tests/Orders/Rx today: Orders Placed This Encounter   Procedures   • US Fetal Biophysical Profile;Without Non-Stress Testing     Order Specific Question:   Reason for Exam:     Answer:   GDM   • US Ob Follow Up Transabdominal Approach     Order Specific Question:   Reason for Exam:     Answer:   EFW, GDM   • Protein / Creatinine Ratio, Urine - Urine, Clean Catch     Order Specific Question:   Release to patient     Answer:   Immediate   • Comprehensive Metabolic Panel     Order Specific Question:   Release to patient     Answer:   Immediate   • CBC & Differential     Order Specific Question:   Manual Differential     Answer:   No     Order Specific Question:   Release to patient     Answer:   Immediate       Medication Management: None     Topics discussed: Prenatal care milestones  glucose management  kick counts and fetal movement  PIH precautions   labor signs and symptoms   Continue Glyburide to 7.5 mg  nightly  PreE labs today  U/S findings  Plan for delivery at 37 weeks if BP + labs are stable.    Tests next visit: BPP  NST   Next visit: 1 week(s)     Ramirez Flores MD  Obstetrics and Gynecology  [facility]

## 2022-05-05 ENCOUNTER — HOSPITAL ENCOUNTER (OUTPATIENT)
Facility: HOSPITAL | Age: 32
Discharge: HOME | End: 2022-05-05
Attending: MIDWIFE | Admitting: MIDWIFE

## 2022-05-05 VITALS
DIASTOLIC BLOOD PRESSURE: 64 MMHG | SYSTOLIC BLOOD PRESSURE: 125 MMHG | HEART RATE: 98 BPM | OXYGEN SATURATION: 98 % | TEMPERATURE: 97.9 F

## 2022-05-05 LAB
BILIRUB BLD-MCNC: NEGATIVE MG/DL
CLARITY, POC: CLEAR
COLOR UR: YELLOW
GLUCOSE UR STRIP-MCNC: NEGATIVE MG/DL
KETONES UR QL: ABNORMAL
LEUKOCYTE EST, POC: ABNORMAL
NITRITE UR-MCNC: NEGATIVE MG/ML
PH UR: 6 [PH] (ref 5–8)
PROT UR STRIP-MCNC: ABNORMAL MG/DL
RBC # UR STRIP: ABNORMAL /UL
SP GR UR: 1.02 (ref 1–1.03)
UROBILINOGEN UR QL: NORMAL

## 2022-05-05 PROCEDURE — G0463 HOSPITAL OUTPT CLINIC VISIT: HCPCS

## 2022-05-05 PROCEDURE — 81002 URINALYSIS NONAUTO W/O SCOPE: CPT | Performed by: MIDWIFE

## 2022-05-05 PROCEDURE — 59025 FETAL NON-STRESS TEST: CPT

## 2022-05-05 PROCEDURE — 59025 FETAL NON-STRESS TEST: CPT | Performed by: MIDWIFE

## 2022-05-05 NOTE — NON STRESS TEST
Triage Note - Nursing Documentation  Labor and Delivery Admission Log    Melvin Jarvis  : 1990  MRN: [id number]  CSN: [id number]    Date in / Time in:  2022  Time in: 1413    Date out / Time out:    Time out: 1530    Nurse: Subha Boykin RN    Patient Info: She is a 31 y.o. year old  at 35w4d with an DAVID of 2022, by Ultrasound who was seen on the [facility].    Chief Complaint:   Chief Complaint   Patient presents with   • Non-stress Test     gdm       Provider Instructions / Disposition: Reactive NST for GDM. No complaints at this time. Follow up May 9.     Patient Active Problem List   Diagnosis   • Mild intermittent asthma with exacerbation   • Gestational diabetes mellitus, class A2       NST Documentation (Only applicable > 32 weeks): Interpretation A  Nonstress Test Interpretation A: Reactive (22 1610 : Subha Boykin, RN)

## 2022-05-09 ENCOUNTER — ROUTINE PRENATAL (OUTPATIENT)
Dept: OBSTETRICS AND GYNECOLOGY | Facility: CLINIC | Age: 32
End: 2022-05-09

## 2022-05-09 ENCOUNTER — PREP FOR SURGERY (OUTPATIENT)
Dept: OTHER | Facility: HOSPITAL | Age: 32
End: 2022-05-09

## 2022-05-09 VITALS — DIASTOLIC BLOOD PRESSURE: 70 MMHG | BODY MASS INDEX: 42.6 KG/M2 | SYSTOLIC BLOOD PRESSURE: 128 MMHG | WEIGHT: 256 LBS

## 2022-05-09 DIAGNOSIS — O24.419 GESTATIONAL DIABETES MELLITUS, CLASS A2: Primary | ICD-10-CM

## 2022-05-09 DIAGNOSIS — Z34.90 ENCOUNTER FOR INDUCTION OF LABOR: Primary | ICD-10-CM

## 2022-05-09 PROCEDURE — 99214 OFFICE O/P EST MOD 30 MIN: CPT | Performed by: MIDWIFE

## 2022-05-09 RX ORDER — MORPHINE SULFATE 4 MG/ML
4 INJECTION, SOLUTION INTRAMUSCULAR; INTRAVENOUS EVERY 4 HOURS PRN
Status: CANCELLED | OUTPATIENT
Start: 2022-05-09 | End: 2022-05-19

## 2022-05-09 RX ORDER — MORPHINE SULFATE 4 MG/ML
4 INJECTION, SOLUTION INTRAMUSCULAR; INTRAVENOUS ONCE AS NEEDED
Status: CANCELLED | OUTPATIENT
Start: 2022-05-09

## 2022-05-09 RX ORDER — SODIUM CHLORIDE, SODIUM LACTATE, POTASSIUM CHLORIDE, CALCIUM CHLORIDE 600; 310; 30; 20 MG/100ML; MG/100ML; MG/100ML; MG/100ML
125 INJECTION, SOLUTION INTRAVENOUS CONTINUOUS
Status: CANCELLED | OUTPATIENT
Start: 2022-05-09

## 2022-05-09 RX ORDER — OXYTOCIN/0.9 % SODIUM CHLORIDE 30/500 ML
85 PLASTIC BAG, INJECTION (ML) INTRAVENOUS ONCE
Status: CANCELLED | OUTPATIENT
Start: 2022-05-09 | End: 2022-05-09

## 2022-05-09 RX ORDER — SODIUM CHLORIDE 0.9 % (FLUSH) 0.9 %
3 SYRINGE (ML) INJECTION EVERY 12 HOURS SCHEDULED
Status: CANCELLED | OUTPATIENT
Start: 2022-05-09

## 2022-05-09 RX ORDER — ONDANSETRON 4 MG/1
4 TABLET, FILM COATED ORAL ONCE AS NEEDED
Status: CANCELLED | OUTPATIENT
Start: 2022-05-09

## 2022-05-09 RX ORDER — METHYLERGONOVINE MALEATE 0.2 MG/ML
200 INJECTION INTRAVENOUS ONCE AS NEEDED
Status: CANCELLED | OUTPATIENT
Start: 2022-05-09

## 2022-05-09 RX ORDER — MORPHINE SULFATE 2 MG/ML
2 INJECTION, SOLUTION INTRAMUSCULAR; INTRAVENOUS ONCE AS NEEDED
Status: CANCELLED | OUTPATIENT
Start: 2022-05-09

## 2022-05-09 RX ORDER — LIDOCAINE HYDROCHLORIDE 10 MG/ML
5 INJECTION, SOLUTION EPIDURAL; INFILTRATION; INTRACAUDAL; PERINEURAL AS NEEDED
Status: CANCELLED | OUTPATIENT
Start: 2022-05-09

## 2022-05-09 RX ORDER — MORPHINE SULFATE 2 MG/ML
6 INJECTION, SOLUTION INTRAMUSCULAR; INTRAVENOUS EVERY 4 HOURS PRN
Status: CANCELLED | OUTPATIENT
Start: 2022-05-09 | End: 2022-05-19

## 2022-05-09 RX ORDER — MISOPROSTOL 200 UG/1
800 TABLET ORAL AS NEEDED
Status: CANCELLED | OUTPATIENT
Start: 2022-05-09

## 2022-05-09 RX ORDER — CARBOPROST TROMETHAMINE 250 UG/ML
250 INJECTION, SOLUTION INTRAMUSCULAR AS NEEDED
Status: CANCELLED | OUTPATIENT
Start: 2022-05-09

## 2022-05-09 RX ORDER — OXYTOCIN/0.9 % SODIUM CHLORIDE 30/500 ML
650 PLASTIC BAG, INJECTION (ML) INTRAVENOUS ONCE
Status: CANCELLED | OUTPATIENT
Start: 2022-05-09 | End: 2022-05-09

## 2022-05-09 RX ORDER — PROMETHAZINE HYDROCHLORIDE 12.5 MG/1
12.5 TABLET ORAL EVERY 6 HOURS PRN
Status: CANCELLED | OUTPATIENT
Start: 2022-05-09

## 2022-05-09 RX ORDER — ACETAMINOPHEN 325 MG/1
650 TABLET ORAL ONCE AS NEEDED
Status: CANCELLED | OUTPATIENT
Start: 2022-05-09

## 2022-05-09 RX ORDER — MAGNESIUM CARB/ALUMINUM HYDROX 105-160MG
30 TABLET,CHEWABLE ORAL ONCE
Status: CANCELLED | OUTPATIENT
Start: 2022-05-09 | End: 2022-05-09

## 2022-05-09 RX ORDER — SODIUM CHLORIDE 0.9 % (FLUSH) 0.9 %
3-10 SYRINGE (ML) INJECTION AS NEEDED
Status: CANCELLED | OUTPATIENT
Start: 2022-05-09

## 2022-05-09 RX ORDER — ONDANSETRON 2 MG/ML
4 INJECTION INTRAMUSCULAR; INTRAVENOUS ONCE AS NEEDED
Status: CANCELLED | OUTPATIENT
Start: 2022-05-09

## 2022-05-09 RX ORDER — OXYTOCIN/0.9 % SODIUM CHLORIDE 30/500 ML
1-20 PLASTIC BAG, INJECTION (ML) INTRAVENOUS
Status: CANCELLED | OUTPATIENT
Start: 2022-05-09

## 2022-05-09 RX ORDER — PROMETHAZINE HYDROCHLORIDE 12.5 MG/1
12.5 SUPPOSITORY RECTAL EVERY 6 HOURS PRN
Status: CANCELLED | OUTPATIENT
Start: 2022-05-09

## 2022-05-09 NOTE — PROGRESS NOTES
Chief Complaint   Patient presents with   • Routine Prenatal Visit     BPP and GBS done , c/o of swelling and increased pressure       HPI: Melvin is a  currently at 36w1d here for prenatal visit who reports the following:  Baby is active. She wants her induction set up for 37 weeks as previously discussed. FBS mostly < 99 but a few have been in 130s, 1 hr pp 120-130. She has had an increase in pelvic pressure the past few days. She has noticed an increase in swelling in legs.                EXAM:     Vitals:    22 0905   BP: 128/70      Abdomen:   See prenatal flowsheet as noted and reviewed, soft, nontender   Pelvic:  See prenatal flowsheet as noted and reviewed FT/50/-2 posterior   Urine:  See prenatal flowsheet as noted and reviewed    Lab Results   Component Value Date    ABO AB 10/27/2021    RH Negative 10/27/2021    ABSCRN See Final Results 10/27/2021    ABSCRN Positive (A) 10/27/2021       MDM:  Impression: DM - GDMA2   Tests done today: GBS testing   BPP    Topics discussed: glucose management  induction of labor- did not want to return in 1 week and then set up induction. Discussed moran bulb/Pitocin  kick counts and fetal movement  labor signs and symptoms  Reviewed OB labs   Tests next visit: none                RTO:                      Moran bulb induction 5/15/22    This note was electronically signed.  RIVKA Jhaveri  2022

## 2022-05-11 LAB — GP B STREP DNA SPEC QL NAA+PROBE: NEGATIVE

## 2022-05-12 ENCOUNTER — HOSPITAL ENCOUNTER (OUTPATIENT)
Facility: HOSPITAL | Age: 32
End: 2022-05-12
Attending: MIDWIFE | Admitting: MIDWIFE

## 2022-05-12 ENCOUNTER — HOSPITAL ENCOUNTER (OUTPATIENT)
Facility: HOSPITAL | Age: 32
Discharge: HOME | End: 2022-05-12
Attending: MIDWIFE | Admitting: MIDWIFE

## 2022-05-12 VITALS
HEART RATE: 85 BPM | WEIGHT: 251.4 LBS | SYSTOLIC BLOOD PRESSURE: 129 MMHG | OXYGEN SATURATION: 100 % | HEIGHT: 65 IN | BODY MASS INDEX: 41.88 KG/M2 | DIASTOLIC BLOOD PRESSURE: 71 MMHG | TEMPERATURE: 98.4 F | RESPIRATION RATE: 16 BRPM

## 2022-05-12 LAB
BACTERIA UR QL AUTO: ABNORMAL /HPF
BILIRUB BLD-MCNC: NEGATIVE MG/DL
BILIRUB UR QL STRIP: NEGATIVE
CLARITY UR: CLEAR
CLARITY, POC: CLEAR
COLOR UR: ABNORMAL
COLOR UR: ABNORMAL
GLUCOSE UR STRIP-MCNC: NEGATIVE MG/DL
GLUCOSE UR STRIP-MCNC: NEGATIVE MG/DL
HGB UR QL STRIP.AUTO: ABNORMAL
HYALINE CASTS UR QL AUTO: ABNORMAL /LPF
KETONES UR QL STRIP: ABNORMAL
KETONES UR QL: ABNORMAL
LEUKOCYTE EST, POC: NEGATIVE
LEUKOCYTE ESTERASE UR QL STRIP.AUTO: ABNORMAL
MUCOUS THREADS URNS QL MICRO: ABNORMAL /HPF
NITRITE UR QL STRIP: NEGATIVE
NITRITE UR-MCNC: NEGATIVE MG/ML
PH UR STRIP.AUTO: 6.5 [PH] (ref 5–8)
PH UR: 6.5 [PH] (ref 5–8)
PROT UR QL STRIP: ABNORMAL
PROT UR STRIP-MCNC: ABNORMAL MG/DL
RBC # UR STRIP: ABNORMAL /HPF
RBC # UR STRIP: ABNORMAL /UL
REF LAB TEST METHOD: ABNORMAL
SP GR UR STRIP: 1.02 (ref 1–1.03)
SP GR UR: 1.02 (ref 1–1.03)
SQUAMOUS #/AREA URNS HPF: ABNORMAL /HPF
UROBILINOGEN UR QL STRIP: ABNORMAL
UROBILINOGEN UR QL: NORMAL
WBC # UR STRIP: ABNORMAL /HPF

## 2022-05-12 PROCEDURE — 81002 URINALYSIS NONAUTO W/O SCOPE: CPT | Performed by: MIDWIFE

## 2022-05-12 PROCEDURE — 59025 FETAL NON-STRESS TEST: CPT | Performed by: MIDWIFE

## 2022-05-12 PROCEDURE — 81001 URINALYSIS AUTO W/SCOPE: CPT | Performed by: MIDWIFE

## 2022-05-12 PROCEDURE — 87086 URINE CULTURE/COLONY COUNT: CPT | Performed by: MIDWIFE

## 2022-05-12 PROCEDURE — 59025 FETAL NON-STRESS TEST: CPT

## 2022-05-12 PROCEDURE — G0463 HOSPITAL OUTPT CLINIC VISIT: HCPCS

## 2022-05-12 RX ORDER — PHENAZOPYRIDINE HYDROCHLORIDE 100 MG/1
100 TABLET, FILM COATED ORAL 3 TIMES DAILY
Qty: 9 TABLET | Refills: 0 | Status: SHIPPED | OUTPATIENT
Start: 2022-05-12 | End: 2022-05-18 | Stop reason: HOSPADM

## 2022-05-12 NOTE — NON STRESS TEST
Triage Note - Nursing Documentation  Labor and Delivery Admission Log    Melvin Jarvis  : 1990  MRN: [id number]  CSN: [id number]    Date in / Time in:  2022  Time in: 1311    Date out / Time out:    Time out: 1442    Nurse: Lian Gonzalez RN    Patient Info: She is a 31 y.o. year old  at 36w4d with an DAVID of 2022, by Ultrasound who was seen on the [facility].    Chief Complaint:   Chief Complaint   Patient presents with   • Non-stress Test     Gestational Diabetes       Provider Instructions / Disposition:     Patient educated on gestational diabetes, fetal movement, and  labor. Patient verbalized understanding to instructions and signed.  Patient has scheduled Induction of Labor on 22 @1600.  Patient instructed if any change in condition; patient to return to [facility] for assessment/evaluation.  Patient discharged home in stable condition. - Lian Gonzalez RN.    Patient Active Problem List   Diagnosis   • Mild intermittent asthma with exacerbation   • Gestational diabetes mellitus, class A2       NST Documentation (Only applicable > 32 weeks): Interpretation A  Nonstress Test Interpretation A: Reactive (22 1429 : Lian Gonzalez, RN)

## 2022-05-13 LAB — BACTERIA SPEC AEROBE CULT: NO GROWTH

## 2022-05-15 ENCOUNTER — HOSPITAL ENCOUNTER (OUTPATIENT)
Dept: LABOR AND DELIVERY | Facility: HOSPITAL | Age: 32
Discharge: HOME | End: 2022-05-15

## 2022-05-15 ENCOUNTER — HOSPITAL ENCOUNTER (INPATIENT)
Facility: HOSPITAL | Age: 32
LOS: 3 days | Discharge: HOME | End: 2022-05-18
Attending: OBSTETRICS & GYNECOLOGY | Admitting: MIDWIFE

## 2022-05-15 DIAGNOSIS — Z34.90 ENCOUNTER FOR INDUCTION OF LABOR: ICD-10-CM

## 2022-05-15 LAB
ABO GROUP BLD: NORMAL
ANTI-D: NORMAL
BASOPHILS # BLD AUTO: 0.02 10*3/MM3 (ref 0–0.2)
BASOPHILS NFR BLD AUTO: 0.2 % (ref 0–1.5)
BLD GP AB SCN SERPL QL: POSITIVE
DEPRECATED RDW RBC AUTO: 47 FL (ref 37–54)
EOSINOPHIL # BLD AUTO: 0.04 10*3/MM3 (ref 0–0.4)
EOSINOPHIL NFR BLD AUTO: 0.4 % (ref 0.3–6.2)
ERYTHROCYTE [DISTWIDTH] IN BLOOD BY AUTOMATED COUNT: 14.6 % (ref 12.3–15.4)
GLUCOSE BLDC GLUCOMTR-MCNC: 75 MG/DL (ref 70–130)
GLUCOSE BLDC GLUCOMTR-MCNC: 94 MG/DL (ref 70–130)
HCT VFR BLD AUTO: 34.4 % (ref 34–46.6)
HGB BLD-MCNC: 11.7 G/DL (ref 12–15.9)
IMM GRANULOCYTES # BLD AUTO: 0.04 10*3/MM3 (ref 0–0.05)
IMM GRANULOCYTES NFR BLD AUTO: 0.4 % (ref 0–0.5)
LYMPHOCYTES # BLD AUTO: 1.62 10*3/MM3 (ref 0.7–3.1)
LYMPHOCYTES NFR BLD AUTO: 16.6 % (ref 19.6–45.3)
MCH RBC QN AUTO: 30 PG (ref 26.6–33)
MCHC RBC AUTO-ENTMCNC: 34 G/DL (ref 31.5–35.7)
MCV RBC AUTO: 88.2 FL (ref 79–97)
MONOCYTES # BLD AUTO: 0.73 10*3/MM3 (ref 0.1–0.9)
MONOCYTES NFR BLD AUTO: 7.5 % (ref 5–12)
NEUTROPHILS NFR BLD AUTO: 7.3 10*3/MM3 (ref 1.7–7)
NEUTROPHILS NFR BLD AUTO: 74.9 % (ref 42.7–76)
NRBC BLD AUTO-RTO: 0 /100 WBC (ref 0–0.2)
PLATELET # BLD AUTO: 161 10*3/MM3 (ref 140–450)
PMV BLD AUTO: 10.5 FL (ref 6–12)
RBC # BLD AUTO: 3.9 10*6/MM3 (ref 3.77–5.28)
RH BLD: NEGATIVE
SARS-COV-2 RNA PNL SPEC NAA+PROBE: NOT DETECTED
T&S EXPIRATION DATE: NORMAL
WBC NRBC COR # BLD: 9.75 10*3/MM3 (ref 3.4–10.8)

## 2022-05-15 PROCEDURE — 36415 COLL VENOUS BLD VENIPUNCTURE: CPT | Performed by: MIDWIFE

## 2022-05-15 PROCEDURE — 86922 COMPATIBILITY TEST ANTIGLOB: CPT

## 2022-05-15 PROCEDURE — 87635 SARS-COV-2 COVID-19 AMP PRB: CPT | Performed by: OBSTETRICS & GYNECOLOGY

## 2022-05-15 PROCEDURE — 86920 COMPATIBILITY TEST SPIN: CPT

## 2022-05-15 PROCEDURE — 86901 BLOOD TYPING SEROLOGIC RH(D): CPT | Performed by: MIDWIFE

## 2022-05-15 PROCEDURE — 86900 BLOOD TYPING SEROLOGIC ABO: CPT | Performed by: MIDWIFE

## 2022-05-15 PROCEDURE — 86870 RBC ANTIBODY IDENTIFICATION: CPT | Performed by: MIDWIFE

## 2022-05-15 PROCEDURE — 82962 GLUCOSE BLOOD TEST: CPT

## 2022-05-15 PROCEDURE — 85025 COMPLETE CBC W/AUTO DIFF WBC: CPT | Performed by: MIDWIFE

## 2022-05-15 PROCEDURE — 86850 RBC ANTIBODY SCREEN: CPT | Performed by: MIDWIFE

## 2022-05-15 RX ORDER — SODIUM CHLORIDE, SODIUM LACTATE, POTASSIUM CHLORIDE, CALCIUM CHLORIDE 600; 310; 30; 20 MG/100ML; MG/100ML; MG/100ML; MG/100ML
125 INJECTION, SOLUTION INTRAVENOUS CONTINUOUS
Status: DISCONTINUED | OUTPATIENT
Start: 2022-05-15 | End: 2022-05-16

## 2022-05-15 RX ORDER — METHYLERGONOVINE MALEATE 0.2 MG/ML
200 INJECTION INTRAVENOUS ONCE AS NEEDED
Status: DISCONTINUED | OUTPATIENT
Start: 2022-05-15 | End: 2022-05-16 | Stop reason: HOSPADM

## 2022-05-15 RX ORDER — ONDANSETRON 2 MG/ML
4 INJECTION INTRAMUSCULAR; INTRAVENOUS ONCE AS NEEDED
Status: DISCONTINUED | OUTPATIENT
Start: 2022-05-15 | End: 2022-05-16 | Stop reason: HOSPADM

## 2022-05-15 RX ORDER — MISOPROSTOL 200 UG/1
800 TABLET ORAL AS NEEDED
Status: DISCONTINUED | OUTPATIENT
Start: 2022-05-15 | End: 2022-05-16 | Stop reason: HOSPADM

## 2022-05-15 RX ORDER — ACETAMINOPHEN 325 MG/1
650 TABLET ORAL ONCE AS NEEDED
Status: DISCONTINUED | OUTPATIENT
Start: 2022-05-15 | End: 2022-05-16 | Stop reason: HOSPADM

## 2022-05-15 RX ORDER — OXYTOCIN/0.9 % SODIUM CHLORIDE 30/500 ML
1-20 PLASTIC BAG, INJECTION (ML) INTRAVENOUS
Status: DISCONTINUED | OUTPATIENT
Start: 2022-05-15 | End: 2022-05-16 | Stop reason: HOSPADM

## 2022-05-15 RX ORDER — SODIUM CHLORIDE 0.9 % (FLUSH) 0.9 %
3-10 SYRINGE (ML) INJECTION AS NEEDED
Status: DISCONTINUED | OUTPATIENT
Start: 2022-05-15 | End: 2022-05-16 | Stop reason: HOSPADM

## 2022-05-15 RX ORDER — SODIUM CHLORIDE 0.9 % (FLUSH) 0.9 %
3 SYRINGE (ML) INJECTION EVERY 12 HOURS SCHEDULED
Status: DISCONTINUED | OUTPATIENT
Start: 2022-05-15 | End: 2022-05-16 | Stop reason: HOSPADM

## 2022-05-15 RX ORDER — MORPHINE SULFATE 4 MG/ML
4 INJECTION, SOLUTION INTRAMUSCULAR; INTRAVENOUS ONCE AS NEEDED
Status: DISCONTINUED | OUTPATIENT
Start: 2022-05-15 | End: 2022-05-16 | Stop reason: HOSPADM

## 2022-05-15 RX ORDER — ONDANSETRON 4 MG/1
4 TABLET, FILM COATED ORAL ONCE AS NEEDED
Status: DISCONTINUED | OUTPATIENT
Start: 2022-05-15 | End: 2022-05-16 | Stop reason: HOSPADM

## 2022-05-15 RX ORDER — PROMETHAZINE HYDROCHLORIDE 12.5 MG/1
12.5 SUPPOSITORY RECTAL EVERY 6 HOURS PRN
Status: DISCONTINUED | OUTPATIENT
Start: 2022-05-15 | End: 2022-05-16 | Stop reason: HOSPADM

## 2022-05-15 RX ORDER — LIDOCAINE HYDROCHLORIDE 10 MG/ML
5 INJECTION, SOLUTION EPIDURAL; INFILTRATION; INTRACAUDAL; PERINEURAL AS NEEDED
Status: DISCONTINUED | OUTPATIENT
Start: 2022-05-15 | End: 2022-05-16 | Stop reason: HOSPADM

## 2022-05-15 RX ORDER — NICOTINE POLACRILEX 4 MG
1 LOZENGE BUCCAL
Status: DISCONTINUED | OUTPATIENT
Start: 2022-05-15 | End: 2022-05-16

## 2022-05-15 RX ORDER — OXYTOCIN/0.9 % SODIUM CHLORIDE 30/500 ML
650 PLASTIC BAG, INJECTION (ML) INTRAVENOUS ONCE
Status: DISCONTINUED | OUTPATIENT
Start: 2022-05-15 | End: 2022-05-16 | Stop reason: HOSPADM

## 2022-05-15 RX ORDER — DEXTROSE MONOHYDRATE 25 G/50ML
25 INJECTION, SOLUTION INTRAVENOUS
Status: DISCONTINUED | OUTPATIENT
Start: 2022-05-15 | End: 2022-05-16

## 2022-05-15 RX ORDER — CARBOPROST TROMETHAMINE 250 UG/ML
250 INJECTION, SOLUTION INTRAMUSCULAR AS NEEDED
Status: DISCONTINUED | OUTPATIENT
Start: 2022-05-15 | End: 2022-05-16 | Stop reason: HOSPADM

## 2022-05-15 RX ORDER — MAGNESIUM CARB/ALUMINUM HYDROX 105-160MG
30 TABLET,CHEWABLE ORAL ONCE
Status: DISCONTINUED | OUTPATIENT
Start: 2022-05-15 | End: 2022-05-16 | Stop reason: HOSPADM

## 2022-05-15 RX ORDER — PROMETHAZINE HYDROCHLORIDE 12.5 MG/1
12.5 TABLET ORAL EVERY 6 HOURS PRN
Status: DISCONTINUED | OUTPATIENT
Start: 2022-05-15 | End: 2022-05-16 | Stop reason: HOSPADM

## 2022-05-15 RX ORDER — MORPHINE SULFATE 2 MG/ML
6 INJECTION, SOLUTION INTRAMUSCULAR; INTRAVENOUS EVERY 4 HOURS PRN
Status: DISCONTINUED | OUTPATIENT
Start: 2022-05-15 | End: 2022-05-16 | Stop reason: HOSPADM

## 2022-05-15 RX ORDER — MORPHINE SULFATE 4 MG/ML
4 INJECTION, SOLUTION INTRAMUSCULAR; INTRAVENOUS EVERY 4 HOURS PRN
Status: DISCONTINUED | OUTPATIENT
Start: 2022-05-15 | End: 2022-05-16 | Stop reason: HOSPADM

## 2022-05-15 RX ORDER — MORPHINE SULFATE 2 MG/ML
2 INJECTION, SOLUTION INTRAMUSCULAR; INTRAVENOUS ONCE AS NEEDED
Status: DISCONTINUED | OUTPATIENT
Start: 2022-05-15 | End: 2022-05-16 | Stop reason: HOSPADM

## 2022-05-15 RX ORDER — OXYTOCIN/0.9 % SODIUM CHLORIDE 30/500 ML
85 PLASTIC BAG, INJECTION (ML) INTRAVENOUS ONCE
Status: DISCONTINUED | OUTPATIENT
Start: 2022-05-15 | End: 2022-05-16 | Stop reason: HOSPADM

## 2022-05-15 RX ADMIN — SODIUM CHLORIDE, POTASSIUM CHLORIDE, SODIUM LACTATE AND CALCIUM CHLORIDE 125 ML/HR: 600; 310; 30; 20 INJECTION, SOLUTION INTRAVENOUS at 17:07

## 2022-05-15 NOTE — NON STRESS TEST
Melvin Jarvis, a  at 37w0d with an DAVID of 2022, by Ultrasound, was seen at [facility] for a nonstress test.    Chief Complaint   Patient presents with   • Scheduled Induction       Patient Active Problem List   Diagnosis   • Mild intermittent asthma with exacerbation   • Gestational diabetes mellitus, class A2       Start Time: 1620  Stop Time: 1650    Interpretation A  Nonstress Test Interpretation A: Reactive (05/15/22 7557 : Kristina Louie RN)

## 2022-05-16 ENCOUNTER — ANESTHESIA (OUTPATIENT)
Dept: LABOR AND DELIVERY | Facility: HOSPITAL | Age: 32
End: 2022-05-16

## 2022-05-16 ENCOUNTER — ANESTHESIA EVENT (OUTPATIENT)
Dept: LABOR AND DELIVERY | Facility: HOSPITAL | Age: 32
End: 2022-05-16

## 2022-05-16 PROBLEM — Z34.90 PREGNANCY: Status: ACTIVE | Noted: 2022-05-16

## 2022-05-16 PROBLEM — Z34.90 PREGNANCY: Status: RESOLVED | Noted: 2022-05-16 | Resolved: 2022-05-16

## 2022-05-16 PROBLEM — Z34.90 ENCOUNTER FOR INDUCTION OF LABOR: Status: ACTIVE | Noted: 2022-05-16

## 2022-05-16 PROBLEM — O13.3 GESTATIONAL HTN, THIRD TRIMESTER: Status: ACTIVE | Noted: 2022-05-16

## 2022-05-16 LAB
GLUCOSE BLDC GLUCOMTR-MCNC: 104 MG/DL (ref 70–130)
GLUCOSE BLDC GLUCOMTR-MCNC: 126 MG/DL (ref 70–130)
GLUCOSE BLDC GLUCOMTR-MCNC: 85 MG/DL (ref 70–130)
GLUCOSE BLDC GLUCOMTR-MCNC: 95 MG/DL (ref 70–130)

## 2022-05-16 PROCEDURE — 59025 FETAL NON-STRESS TEST: CPT

## 2022-05-16 PROCEDURE — 90707 MMR VACCINE SC: CPT | Performed by: OBSTETRICS & GYNECOLOGY

## 2022-05-16 PROCEDURE — 25010000002 FENTANYL CITRATE (PF) 100 MCG/2ML SOLUTION: Performed by: NURSE ANESTHETIST, CERTIFIED REGISTERED

## 2022-05-16 PROCEDURE — 90471 IMMUNIZATION ADMIN: CPT | Performed by: OBSTETRICS & GYNECOLOGY

## 2022-05-16 PROCEDURE — 3E033VJ INTRODUCTION OF OTHER HORMONE INTO PERIPHERAL VEIN, PERCUTANEOUS APPROACH: ICD-10-PCS | Performed by: OBSTETRICS & GYNECOLOGY

## 2022-05-16 PROCEDURE — 90715 TDAP VACCINE 7 YRS/> IM: CPT | Performed by: OBSTETRICS & GYNECOLOGY

## 2022-05-16 PROCEDURE — 90472 IMMUNIZATION ADMIN EACH ADD: CPT | Performed by: OBSTETRICS & GYNECOLOGY

## 2022-05-16 PROCEDURE — G0463 HOSPITAL OUTPT CLINIC VISIT: HCPCS

## 2022-05-16 PROCEDURE — 82962 GLUCOSE BLOOD TEST: CPT

## 2022-05-16 PROCEDURE — 25010000002 MEASLES, MUMPS & RUBELLA VAC RECONSTITUTED SOLUTION: Performed by: OBSTETRICS & GYNECOLOGY

## 2022-05-16 PROCEDURE — 10907ZC DRAINAGE OF AMNIOTIC FLUID, THERAPEUTIC FROM PRODUCTS OF CONCEPTION, VIA NATURAL OR ARTIFICIAL OPENING: ICD-10-PCS | Performed by: OBSTETRICS & GYNECOLOGY

## 2022-05-16 PROCEDURE — 59410 OBSTETRICAL CARE: CPT | Performed by: OBSTETRICS & GYNECOLOGY

## 2022-05-16 PROCEDURE — 51703 INSERT BLADDER CATH COMPLEX: CPT

## 2022-05-16 PROCEDURE — 25010000002 TETANUS-DIPHTH-ACELL PERTUSSIS 5-2.5-18.5 LF-MCG/0.5 SUSPENSION PREFILLED SYRINGE: Performed by: OBSTETRICS & GYNECOLOGY

## 2022-05-16 RX ORDER — TRISODIUM CITRATE DIHYDRATE AND CITRIC ACID MONOHYDRATE 500; 334 MG/5ML; MG/5ML
30 SOLUTION ORAL ONCE
Status: DISCONTINUED | OUTPATIENT
Start: 2022-05-16 | End: 2022-05-16 | Stop reason: HOSPADM

## 2022-05-16 RX ORDER — SODIUM CHLORIDE 0.9 % (FLUSH) 0.9 %
1-10 SYRINGE (ML) INJECTION AS NEEDED
Status: DISCONTINUED | OUTPATIENT
Start: 2022-05-16 | End: 2022-05-18 | Stop reason: HOSPADM

## 2022-05-16 RX ORDER — ONDANSETRON 2 MG/ML
4 INJECTION INTRAMUSCULAR; INTRAVENOUS EVERY 6 HOURS PRN
Status: DISCONTINUED | OUTPATIENT
Start: 2022-05-16 | End: 2022-05-18 | Stop reason: HOSPADM

## 2022-05-16 RX ORDER — BISACODYL 10 MG
10 SUPPOSITORY, RECTAL RECTAL DAILY PRN
Status: DISCONTINUED | OUTPATIENT
Start: 2022-05-17 | End: 2022-05-18 | Stop reason: HOSPADM

## 2022-05-16 RX ORDER — IBUPROFEN 600 MG/1
600 TABLET ORAL EVERY 6 HOURS PRN
Status: DISCONTINUED | OUTPATIENT
Start: 2022-05-16 | End: 2022-05-18 | Stop reason: HOSPADM

## 2022-05-16 RX ORDER — FENTANYL CITRATE 50 UG/ML
INJECTION, SOLUTION INTRAMUSCULAR; INTRAVENOUS AS NEEDED
Status: DISCONTINUED | OUTPATIENT
Start: 2022-05-16 | End: 2022-05-16 | Stop reason: SURG

## 2022-05-16 RX ORDER — ONDANSETRON 4 MG/1
4 TABLET, FILM COATED ORAL EVERY 8 HOURS PRN
Status: DISCONTINUED | OUTPATIENT
Start: 2022-05-16 | End: 2022-05-18 | Stop reason: HOSPADM

## 2022-05-16 RX ORDER — HYDROCORTISONE 25 MG/G
1 CREAM TOPICAL AS NEEDED
Status: DISCONTINUED | OUTPATIENT
Start: 2022-05-16 | End: 2022-05-18 | Stop reason: HOSPADM

## 2022-05-16 RX ORDER — DOCUSATE SODIUM 100 MG/1
100 CAPSULE, LIQUID FILLED ORAL 2 TIMES DAILY
Status: DISCONTINUED | OUTPATIENT
Start: 2022-05-16 | End: 2022-05-18 | Stop reason: HOSPADM

## 2022-05-16 RX ORDER — PRENATAL VIT/IRON FUM/FOLIC AC 27MG-0.8MG
1 TABLET ORAL DAILY
Status: DISCONTINUED | OUTPATIENT
Start: 2022-05-16 | End: 2022-05-18 | Stop reason: HOSPADM

## 2022-05-16 RX ORDER — PROMETHAZINE HYDROCHLORIDE 12.5 MG/1
12.5 SUPPOSITORY RECTAL EVERY 6 HOURS PRN
Status: DISCONTINUED | OUTPATIENT
Start: 2022-05-16 | End: 2022-05-18 | Stop reason: HOSPADM

## 2022-05-16 RX ORDER — ACETAMINOPHEN 325 MG/1
650 TABLET ORAL EVERY 4 HOURS PRN
Status: DISCONTINUED | OUTPATIENT
Start: 2022-05-16 | End: 2022-05-18 | Stop reason: HOSPADM

## 2022-05-16 RX ORDER — ONDANSETRON 2 MG/ML
4 INJECTION INTRAMUSCULAR; INTRAVENOUS ONCE AS NEEDED
Status: DISCONTINUED | OUTPATIENT
Start: 2022-05-16 | End: 2022-05-16 | Stop reason: HOSPADM

## 2022-05-16 RX ORDER — EPHEDRINE SULFATE 5 MG/ML
5 INJECTION INTRAVENOUS
Status: DISCONTINUED | OUTPATIENT
Start: 2022-05-16 | End: 2022-05-16 | Stop reason: HOSPADM

## 2022-05-16 RX ORDER — FENTANYL CITRATE 50 UG/ML
INJECTION, SOLUTION INTRAMUSCULAR; INTRAVENOUS
Status: COMPLETED
Start: 2022-05-16 | End: 2022-05-16

## 2022-05-16 RX ORDER — HYDROCODONE BITARTRATE AND ACETAMINOPHEN 5; 325 MG/1; MG/1
1 TABLET ORAL EVERY 4 HOURS PRN
Status: DISCONTINUED | OUTPATIENT
Start: 2022-05-16 | End: 2022-05-18 | Stop reason: HOSPADM

## 2022-05-16 RX ORDER — DIPHENHYDRAMINE HCL 25 MG
25 CAPSULE ORAL NIGHTLY PRN
Status: DISCONTINUED | OUTPATIENT
Start: 2022-05-16 | End: 2022-05-18 | Stop reason: HOSPADM

## 2022-05-16 RX ORDER — HYDROCODONE BITARTRATE AND ACETAMINOPHEN 7.5; 325 MG/1; MG/1
1 TABLET ORAL EVERY 4 HOURS PRN
Status: DISCONTINUED | OUTPATIENT
Start: 2022-05-16 | End: 2022-05-18 | Stop reason: HOSPADM

## 2022-05-16 RX ORDER — INSULIN ASPART 100 [IU]/ML
1-10 INJECTION, SOLUTION INTRAVENOUS; SUBCUTANEOUS
Status: DISCONTINUED | OUTPATIENT
Start: 2022-05-16 | End: 2022-05-16

## 2022-05-16 RX ORDER — PROMETHAZINE HYDROCHLORIDE 25 MG/1
25 TABLET ORAL EVERY 6 HOURS PRN
Status: DISCONTINUED | OUTPATIENT
Start: 2022-05-16 | End: 2022-05-18 | Stop reason: HOSPADM

## 2022-05-16 RX ADMIN — PRAMOXINE HYDROCHLORIDE HYDROCORTISONE ACETATE 1 APPLICATION: 100; 100 AEROSOL, FOAM TOPICAL at 20:58

## 2022-05-16 RX ADMIN — IBUPROFEN 600 MG: 600 TABLET ORAL at 20:55

## 2022-05-16 RX ADMIN — FENTANYL CITRATE 25 MCG: 50 INJECTION, SOLUTION INTRAMUSCULAR; INTRAVENOUS at 07:45

## 2022-05-16 RX ADMIN — SODIUM CHLORIDE, POTASSIUM CHLORIDE, SODIUM LACTATE AND CALCIUM CHLORIDE 125 ML/HR: 600; 310; 30; 20 INJECTION, SOLUTION INTRAVENOUS at 00:02

## 2022-05-16 RX ADMIN — FENTANYL CITRATE 75 MCG: 50 INJECTION, SOLUTION INTRAMUSCULAR; INTRAVENOUS at 07:50

## 2022-05-16 RX ADMIN — TETANUS TOXOID, REDUCED DIPHTHERIA TOXOID AND ACELLULAR PERTUSSIS VACCINE, ADSORBED 0.5 ML: 5; 2.5; 8; 8; 2.5 SUSPENSION INTRAMUSCULAR at 20:56

## 2022-05-16 RX ADMIN — DOCUSATE SODIUM 100 MG: 100 CAPSULE, LIQUID FILLED ORAL at 20:55

## 2022-05-16 RX ADMIN — ACETAMINOPHEN 650 MG: 325 TABLET ORAL at 20:55

## 2022-05-16 RX ADMIN — Medication 12 ML/HR: at 07:54

## 2022-05-16 RX ADMIN — Medication 1 MILLI-UNITS/MIN: at 00:03

## 2022-05-16 RX ADMIN — SODIUM CHLORIDE, POTASSIUM CHLORIDE, SODIUM LACTATE AND CALCIUM CHLORIDE 1000 ML: 600; 310; 30; 20 INJECTION, SOLUTION INTRAVENOUS at 07:38

## 2022-05-16 RX ADMIN — MEASLES, MUMPS, AND RUBELLA VIRUS VACCINE LIVE 0.5 ML: 1000; 12500; 1000 INJECTION, POWDER, LYOPHILIZED, FOR SUSPENSION SUBCUTANEOUS at 20:58

## 2022-05-16 RX ADMIN — BENZOCAINE AND LEVOMENTHOL 1 APPLICATION: 200; 5 SPRAY TOPICAL at 20:58

## 2022-05-16 NOTE — L&D DELIVERY NOTE
[facility]  Vaginal Delivery Note    Delivery details     Delivery: Vaginal, Spontaneous     YOB: 2022    Time of Birth: 1:06 PM      Anesthesia: Epidural     Delivering clinician: Ramirez Flores    Forceps?   No   Vacuum? No    Shoulder dystocia present: No      Delivery narrative: The patient progressed in labor to 10/100/+2 and began pushing to spontaneously deliver a viable male infant.  The infant was placed on the mother's abdomen and the mouth and nose were bulb suctioned.  Cord clamping was delayed 30 seconds.  The placenta was delivered in standard fashion and was found to be intact.  Uterine tone and bleeding were adequate with massage and Pitocin.  Lacerations were inspected and repaired as detailed below.    Infant details    Findings: male  infant     Infant observations: Weight: 3685 g (8 lb 2 oz)   Length: 21  in   Apgars: 8  @ 1 minute /    9  @ 5 minutes     Placenta, Cord, and Fluid    Placenta delivered  Spontaneous  at   5/16/2022  1:13 PM     Cord: 3 vessels  present.   Nuchal Cord?  yes; Number of nuchal loops present:  1    Cord blood obtained: Yes      Repair    Episiotomy: None    Lacerations: Yes  Laceration Information  Laceration Repaired?   Perineal: None      Periurethral:       Labial: bilateral  Yes    Sulcus:       Vaginal:       Cervical:         Suture used for repair: 3-0 and 2-0 Vicryl     Estimated Blood Loss:   450 cc       Complications  None    Disposition  Mother to Mother Baby/Postpartum in stable condition currently.  Baby to remains with mom in stable condition currently.      Ramirez Flores MD  Obstetrics and Gynecology  [facility]

## 2022-05-16 NOTE — H&P
OBSTETRICS HISTORY AND PHYSICAL    CHIEF COMPLAINT: Induction of labor    DIAGNOSIS:  IUP at 37w1d  Gestational HTN  A2DM  Asthma  LGA  BMI 43  Rh NEG    ASSESSMENT/PLAN     31 y.o.  at 37w1d who presents for scheduled induction of labor.    # IOL  - Low-dose pitocin overnight, no FB  - SVE /-3  - AROM at 0710 with small amount of blood-tinged fluid returned, mother and baby tolerated the procedure well, no complications  - Progressive pitocin  - Epidural prn    # gHTN  - Borderline Bps so far    # A2DM  - FSBG values every 2 hours  - SSI ordered prn    # Fetal Wellbeing   - Fetal tracing: Cat 1, reactive   - Ultrasound: reviewed   - Group B Streptococcus: negative   - Presentation: cephalic confirmed by recent U/S    # Routine Obstetrics  - Labs reviewed  - MBT: AB NEG    HISTORY OF PRESENT ILLNESS     31 y.o.  at 37w1d who presents for scheduled induction of labor.    OB Review of Systems:  Fetal movement: active  Vaginal bleeding: no  Loss of fluid: no  Contractions: no    Preeclampsia Symptoms Review:  Headaches: no  Vision changes:no  Chest pain and/or shortness of breath: no  RUQ pain: no    REVIEW OF SYSTEMS: A complete review of systems was performed and was specifically negative for headache, changes in vision, RUQ pain, shortness of breath, chest pain, lower extremity edema and dysuria.     HISTORY:  Obstetrical History:  OB History    Para Term  AB Living   5 1 1   3 1   SAB IAB Ectopic Molar Multiple Live Births   3         1      # Outcome Date GA Lbr Jun/2nd Weight Sex Delivery Anes PTL Lv   5 Current            4 Term 10/07/09 41w0d  3345 g (7 lb 6 oz)  Vag-Spont EPI  PRANAY   3 SAB            2 SAB            1 SAB                Past Medical History:  Past Medical History:   Diagnosis Date   • GDM (gestational diabetes mellitus), class A1 2022   • Mild intermittent asthma with exacerbation 2022   • PCOS (polycystic ovarian syndrome)    • Polycystic ovary  syndrome    • Recurrent pregnancy loss, antepartum condition or complication    • Rh incompatibility        Past Surgical History:  Past Surgical History:   Procedure Laterality Date   • CHOLECYSTECTOMY     • D & C WITH SUCTION      2007    • DILATATION AND CURETTAGE     • LAPAROSCOPIC CHOLECYSTECTOMY         Social History:  Social History     Tobacco Use   • Smoking status: Former Smoker     Packs/day: 1.50     Years: 15.00     Pack years: 22.50     Types: Cigarettes, Electronic Cigarette     Quit date: 2021     Years since quittin.2   • Smokeless tobacco: Never Used   Vaping Use   • Vaping Use: Every day   • Substances: Nicotine   • Devices: Disposable   • Passive vaping exposure: Yes   Substance Use Topics   • Alcohol use: Never   • Drug use: Never       Family History  Family History   Problem Relation Age of Onset   • Diabetes Father    • Hypertension Mother           OBJECTIVE   VITALS:  Temp:  [98.2 °F (36.8 °C)-98.6 °F (37 °C)] 98.2 °F (36.8 °C)  Heart Rate:  [] 76  Resp:  [18] 18  BP: (112-132)/(45-70) 132/45    PHYSICAL EXAM:  GENERAL: NAD, alert  CHEST: No increased work of breathing, CTAB  CV: RRR, WWP  ABDOMEN: Gravid, nontender  EXTREMITIES:  Warm and well-perfused, nontender, nonedematous  NEURO: AAO x 3, CN II-XII grossly intact  CERVIX: 4/ 50/ -3    Fetal Monitoring:  Cat 1, reactive     Allergies:   Allergies   Allergen Reactions   • Amoxicillin Hives   • Latex Hives   • Penicillins Hives   • Shellfish-Derived Products Hives   • Sulfa Antibiotics Hives and Itching       No current facility-administered medications on file prior to encounter.     Current Outpatient Medications on File Prior to Encounter   Medication Sig Dispense Refill   • ferrous sulfate 325 (65 FE) MG tablet Take 1 tablet by mouth 2 (Two) Times a Day Before Meals. 60 tablet 6   • glyburide (DIAbeta) 5 MG tablet Take 1.5 tablets by mouth Every Night. 45 tablet 5   • Lancets Thin misc Fasting  and 1 hour after meals (4 times daily) 100 each 12   • [] phenazopyridine (Pyridium) 100 MG tablet Take 1 tablet by mouth 3 (Three) Times a Day for 3 days. 9 tablet 0   • Prenatal Vit-Fe Fumarate-FA (prenatal vitamin 27-0.8) 27-0.8 MG tablet tablet Take  by mouth Daily.     • ProAir  (90 Base) MCG/ACT inhaler      • Blood Glucose Monitoring Suppl (ONE TOUCH ULTRA 2) w/Device kit      • glucose blood test strip Fasting and 1 hour after meals (4 times daily) 100 each 12   • glucose monitor monitoring kit 1 each As Needed (Fasting and 1 hour after meals (4 times daily)). 1 each 0       DIAGNOSTIC STUDIES:  Results from last 7 days   Lab Units 05/15/22  1641   WBC 10*3/mm3 9.75   HEMOGLOBIN g/dL 11.7*   HEMATOCRIT % 34.4   PLATELETS 10*3/mm3 161       Recent Results (from the past 24 hour(s))   COVID-19,[facility] IN-HOUSE,Nasal Swab No Transport Media 3-4 HR TAT - Swab, Nasal Cavity    Collection Time: 05/15/22  4:33 PM    Specimen: Nasal Cavity; Swab   Result Value Ref Range    COVID19 Not Detected Not Detected - Ref. Range   Type & Screen    Collection Time: 05/15/22  4:41 PM    Specimen: Blood   Result Value Ref Range    ABO Type AB     RH type Negative     Antibody Screen Positive     T&S Expiration Date 2022 11:59:59 PM    CBC Auto Differential    Collection Time: 05/15/22  4:41 PM    Specimen: Blood   Result Value Ref Range    WBC 9.75 3.40 - 10.80 10*3/mm3    RBC 3.90 3.77 - 5.28 10*6/mm3    Hemoglobin 11.7 (L) 12.0 - 15.9 g/dL    Hematocrit 34.4 34.0 - 46.6 %    MCV 88.2 79.0 - 97.0 fL    MCH 30.0 26.6 - 33.0 pg    MCHC 34.0 31.5 - 35.7 g/dL    RDW 14.6 12.3 - 15.4 %    RDW-SD 47.0 37.0 - 54.0 fl    MPV 10.5 6.0 - 12.0 fL    Platelets 161 140 - 450 10*3/mm3    Neutrophil % 74.9 42.7 - 76.0 %    Lymphocyte % 16.6 (L) 19.6 - 45.3 %    Monocyte % 7.5 5.0 - 12.0 %    Eosinophil % 0.4 0.3 - 6.2 %    Basophil % 0.2 0.0 - 1.5 %    Immature Grans % 0.4 0.0 - 0.5 %    Neutrophils, Absolute 7.30 (H) 1.70  - 7.00 10*3/mm3    Lymphocytes, Absolute 1.62 0.70 - 3.10 10*3/mm3    Monocytes, Absolute 0.73 0.10 - 0.90 10*3/mm3    Eosinophils, Absolute 0.04 0.00 - 0.40 10*3/mm3    Basophils, Absolute 0.02 0.00 - 0.20 10*3/mm3    Immature Grans, Absolute 0.04 0.00 - 0.05 10*3/mm3    nRBC 0.0 0.0 - 0.2 /100 WBC   Antibody Identification    Collection Time: 05/15/22  4:41 PM    Specimen: Blood   Result Value Ref Range    Anti-D ANTI-D    POC Glucose Once    Collection Time: 05/15/22  5:26 PM    Specimen: Blood   Result Value Ref Range    Glucose 75 70 - 130 mg/dL   Prepare RBC, 2 Units    Collection Time: 05/15/22  6:22 PM   Result Value Ref Range    Product Code G9585L86     Unit Number [id number]     UNIT  ABO AB     UNIT  RH NEG     Crossmatch Interpretation Compatible     Dispense Status XM     Blood Expiration Date [id number]     Blood Type Barcode 2800     Product Code V1934U05     Unit Number [id number]     UNIT  ABO A     UNIT  RH NEG     Crossmatch Interpretation Compatible     Dispense Status XM     Blood Expiration Date [id number]     Blood Type Barcode 0600    POC Glucose Once    Collection Time: 05/15/22  8:43 PM    Specimen: Blood   Result Value Ref Range    Glucose 94 70 - 130 mg/dL   POC Glucose Once    Collection Time: 05/16/22  1:05 AM    Specimen: Blood   Result Value Ref Range    Glucose 126 70 - 130 mg/dL   POC Glucose Once    Collection Time: 05/16/22  5:40 AM    Specimen: Blood   Result Value Ref Range    Glucose 104 70 - 130 mg/dL       Ramirez Flores MD  Obstetrics and Gynecology  [facility]

## 2022-05-16 NOTE — ANESTHESIA PROCEDURE NOTES
CSE Block      Patient reassessed immediately prior to procedure    Reason for block: procedure for pain, at surgeon's request, post-op pain management and labor pain  Staffing  Resident/CRNA: Cristo Craven CRNA  CSE  Patient position: sitting  Prep: ChloraPrep  Patient monitoring: blood pressure monitoring and continuous pulse oximetry  Procedures: landmark technique and palpation technique  Spinal Needle  Needle type: Pencan   Needle gauge: 25 G  Approach: midline  Location: L3-4  Fluid Appearance: clear    Epidural Needle  Injection technique: KYLE saline  Needle type: Tuohy   Needle gauge: 18 G  Location: L3-L4  Level: 10-11  Loss of Resistance: 5cm  Cath Depth at Skin (cm): 9  Aspiration: negative  Test dose: negative      Catheter  Catheter type: end hole  Catheter size: 20 G  Assessment  Dressing:occlusive dressing applied and secured with tape  Pt Tolerance:patient tolerated the procedure well with no apparent complications  Complications:no  Additional Notes  Risks discussed , including but not limited to:  Headache, itching, n&v, infection, failure, decreased blood pressure, permanent chronic/back pain, nerve damage, paralysis, etc. All questions answered and informed consent obtained. Skin localized with lidocaine skin wheel. Test dose _ ml of 1.5% lidocaine with 1:200,000 epi.

## 2022-05-16 NOTE — ANESTHESIA PREPROCEDURE EVALUATION
Anesthesia Evaluation     Patient summary reviewed and Nursing notes reviewed   no history of anesthetic complications:  NPO Solid Status: > 8 hours  NPO Liquid Status: > 8 hours           Airway   Mallampati: II  TM distance: <3 FB  Neck ROM: full  Possible difficult intubation and Large neck circumference  Dental - normal exam     Pulmonary - normal exam   (+) asthma,  Cardiovascular - normal exam    Rhythm: regular  Rate: normal        Neuro/Psych  GI/Hepatic/Renal/Endo    (+) morbid obesity,  diabetes mellitus gestational,     Musculoskeletal     Abdominal   (+) obese,     Abdomen: soft.  Bowel sounds: normal.   Substance History      OB/GYN    (+) Pregnant,         Other        ROS/Med Hx Other: PCOS  Plt 161                  Anesthesia Plan    ASA 3     epidural   (Risks discussed , including but not limited to:  Headache, itching, n&v, infection, failure, decreased blood pressure, permanent chronic/back pain, nerve damage, paralysis, etc. All questions answered and informed consent obtained. )    Anesthetic plan, all risks, benefits, and alternatives have been provided, discussed and informed consent has been obtained with: patient.        CODE STATUS:    Level Of Support Discussed With: Patient  Code Status (Patient has no pulse and is not breathing): CPR (Attempt to Resuscitate)  Medical Interventions (Patient has pulse or is breathing): Full Support

## 2022-05-17 LAB
ABO GROUP BLD: NORMAL
BASOPHILS # BLD AUTO: 0.03 10*3/MM3 (ref 0–0.2)
BASOPHILS NFR BLD AUTO: 0.3 % (ref 0–1.5)
DEPRECATED RDW RBC AUTO: 46.8 FL (ref 37–54)
EOSINOPHIL # BLD AUTO: 0.09 10*3/MM3 (ref 0–0.4)
EOSINOPHIL NFR BLD AUTO: 0.9 % (ref 0.3–6.2)
ERYTHROCYTE [DISTWIDTH] IN BLOOD BY AUTOMATED COUNT: 14.6 % (ref 12.3–15.4)
FETAL BLEED: NEGATIVE
GLUCOSE BLDC GLUCOMTR-MCNC: 109 MG/DL (ref 70–130)
GLUCOSE BLDC GLUCOMTR-MCNC: 131 MG/DL (ref 70–130)
GLUCOSE BLDC GLUCOMTR-MCNC: 161 MG/DL (ref 70–130)
GLUCOSE BLDC GLUCOMTR-MCNC: 183 MG/DL (ref 70–130)
HCT VFR BLD AUTO: 31.6 % (ref 34–46.6)
HGB BLD-MCNC: 10.5 G/DL (ref 12–15.9)
IMM GRANULOCYTES # BLD AUTO: 0.04 10*3/MM3 (ref 0–0.05)
IMM GRANULOCYTES NFR BLD AUTO: 0.4 % (ref 0–0.5)
LYMPHOCYTES # BLD AUTO: 2.05 10*3/MM3 (ref 0.7–3.1)
LYMPHOCYTES NFR BLD AUTO: 21.5 % (ref 19.6–45.3)
MCH RBC QN AUTO: 29.7 PG (ref 26.6–33)
MCHC RBC AUTO-ENTMCNC: 33.2 G/DL (ref 31.5–35.7)
MCV RBC AUTO: 89.3 FL (ref 79–97)
MONOCYTES # BLD AUTO: 0.75 10*3/MM3 (ref 0.1–0.9)
MONOCYTES NFR BLD AUTO: 7.9 % (ref 5–12)
NEUTROPHILS NFR BLD AUTO: 6.58 10*3/MM3 (ref 1.7–7)
NEUTROPHILS NFR BLD AUTO: 69 % (ref 42.7–76)
NRBC BLD AUTO-RTO: 0 /100 WBC (ref 0–0.2)
NUMBER OF DOSES: NORMAL
PLATELET # BLD AUTO: 154 10*3/MM3 (ref 140–450)
PMV BLD AUTO: 11 FL (ref 6–12)
RBC # BLD AUTO: 3.54 10*6/MM3 (ref 3.77–5.28)
RH BLD: NEGATIVE
WBC NRBC COR # BLD: 9.54 10*3/MM3 (ref 3.4–10.8)

## 2022-05-17 PROCEDURE — 85461 HEMOGLOBIN FETAL: CPT | Performed by: OBSTETRICS & GYNECOLOGY

## 2022-05-17 PROCEDURE — 86901 BLOOD TYPING SEROLOGIC RH(D): CPT | Performed by: OBSTETRICS & GYNECOLOGY

## 2022-05-17 PROCEDURE — 25010000002 RHO D IMMUNE GLOBULIN 1500 UNIT/2ML SOLUTION PREFILLED SYRINGE: Performed by: OBSTETRICS & GYNECOLOGY

## 2022-05-17 PROCEDURE — 82962 GLUCOSE BLOOD TEST: CPT

## 2022-05-17 PROCEDURE — 86900 BLOOD TYPING SEROLOGIC ABO: CPT | Performed by: OBSTETRICS & GYNECOLOGY

## 2022-05-17 PROCEDURE — 85025 COMPLETE CBC W/AUTO DIFF WBC: CPT | Performed by: OBSTETRICS & GYNECOLOGY

## 2022-05-17 RX ORDER — FERROUS SULFATE TAB EC 324 MG (65 MG FE EQUIVALENT) 324 (65 FE) MG
324 TABLET DELAYED RESPONSE ORAL 2 TIMES DAILY WITH MEALS
Status: DISCONTINUED | OUTPATIENT
Start: 2022-05-17 | End: 2022-05-18 | Stop reason: HOSPADM

## 2022-05-17 RX ADMIN — ACETAMINOPHEN 650 MG: 325 TABLET ORAL at 17:02

## 2022-05-17 RX ADMIN — DOCUSATE SODIUM 100 MG: 100 CAPSULE, LIQUID FILLED ORAL at 08:05

## 2022-05-17 RX ADMIN — DOCUSATE SODIUM 100 MG: 100 CAPSULE, LIQUID FILLED ORAL at 20:48

## 2022-05-17 RX ADMIN — HUMAN RHO(D) IMMUNE GLOBULIN 1500 UNITS: 1500 SOLUTION INTRAMUSCULAR; INTRAVENOUS at 10:36

## 2022-05-17 RX ADMIN — FERROUS SULFATE TAB EC 324 MG (65 MG FE EQUIVALENT) 324 MG: 324 (65 FE) TABLET DELAYED RESPONSE at 17:02

## 2022-05-17 RX ADMIN — PRENATAL VITAMINS-IRON FUMARATE 27 MG IRON-FOLIC ACID 0.8 MG TABLET 1 TABLET: at 08:05

## 2022-05-17 NOTE — PROGRESS NOTES
Vasquez Jarvis  : 1990  MRN: [id number]  CSN: [id number]    Postpartum Day #1  Subjective   Her pain is well controlled.  Vaginal bleeding is appropriate amount. She is voiding without difficulty. She is breast feeding .     Objective     Min/max vitals past 24 hours:   Temp  Min: 97.9 °F (36.6 °C)  Max: 98.4 °F (36.9 °C)  BP  Min: 98/48  Max: 157/101  Pulse  Min: 70  Max: 122  Resp  Min: 16  Max: 20        General: well developed; well nourished  no acute distress   Abdomen: fundus firm and non-tender   Pelvic: Not performed   Ext: Calves NT     Lab Results   Component Value Date    WBC 9.54 2022    HGB 10.5 (L) 2022    HCT 31.6 (L) 2022    MCV 89.3 2022     2022    ABO AB 2022    RH Negative 2022    RUBELLAABIGG <0.90 (L) 10/27/2021    HEPBSAG Negative 10/27/2021       1 hr      Assessment   1. Postpartum Day #1 S/P vaginal delivery   2. Postpartum anemia     Plan   1. Continue routine postpartum care   2. Ferrous Sulfate BID    This note was electronically signed  Crystal Craven, RIVKA  2022  11:04 EDT

## 2022-05-17 NOTE — PLAN OF CARE
Problem: Adult Inpatient Plan of Care  Goal: Plan of Care Review  Outcome: Ongoing, Progressing  Goal: Patient-Specific Goal (Individualized)  Outcome: Ongoing, Progressing  Flowsheets (Taken 5/17/2022 0301)  Patient-Specific Goals (Include Timeframe): Vital signs stable. Breast milk and formula feeding.  Goal: Absence of Hospital-Acquired Illness or Injury  Outcome: Ongoing, Progressing  Intervention: Identify and Manage Fall Risk  Recent Flowsheet Documentation  Taken 5/17/2022 0230 by Ramo Bray RN  Safety Promotion/Fall Prevention: safety round/check completed  Taken 5/17/2022 0130 by Ramo Bray RN  Safety Promotion/Fall Prevention: safety round/check completed  Taken 5/17/2022 0030 by Ramo Bray RN  Safety Promotion/Fall Prevention:   safety round/check completed   room organization consistent   nonskid shoes/slippers when out of bed   fall prevention program maintained   clutter free environment maintained  Taken 5/16/2022 2230 by Ramo Bray RN  Safety Promotion/Fall Prevention: safety round/check completed  Taken 5/16/2022 2130 by Ramo Bray RN  Safety Promotion/Fall Prevention: safety round/check completed  Taken 5/16/2022 2030 by Ramo Bray RN  Safety Promotion/Fall Prevention: safety round/check completed  Taken 5/16/2022 1930 by Ramo Bray RN  Safety Promotion/Fall Prevention:   safety round/check completed   room organization consistent   nonskid shoes/slippers when out of bed   fall prevention program maintained   clutter free environment maintained  Intervention: Prevent and Manage VTE (Venous Thromboembolism) Risk  Recent Flowsheet Documentation  Taken 5/16/2022 1930 by Ramo Bray RN  Activity Management: up ad eileen  Intervention: Prevent Infection  Recent Flowsheet Documentation  Taken 5/16/2022 1930 by Ramo Bray RN  Infection Prevention:   visitors restricted/screened   single patient room provided   hand hygiene promoted   equipment surfaces  disinfected   personal protective equipment utilized   rest/sleep promoted  Goal: Optimal Comfort and Wellbeing  Outcome: Ongoing, Progressing  Intervention: Provide Person-Centered Care  Recent Flowsheet Documentation  Taken 5/16/2022 1930 by Ramo Bray RN  Trust Relationship/Rapport:   care explained   choices provided   emotional support provided   empathic listening provided   questions answered   questions encouraged   reassurance provided   thoughts/feelings acknowledged  Goal: Readiness for Transition of Care  Outcome: Ongoing, Progressing  Intervention: Mutually Develop Transition Plan  Recent Flowsheet Documentation  Taken 5/16/2022 2324 by Ramo Bray RN  Transportation Anticipated: family or friend will provide  Transportation Concerns: none  Patient/Family Anticipated Services at Transition: none  Patient/Family Anticipates Transition to: home with family     Problem: Adult Inpatient Plan of Care  Goal: Plan of Care Review  Outcome: Ongoing, Progressing  Goal: Patient-Specific Goal (Individualized)  Outcome: Ongoing, Progressing  Flowsheets (Taken 5/17/2022 0301)  Patient-Specific Goals (Include Timeframe): Vital signs stable. Breast milk and formula feeding.  Goal: Absence of Hospital-Acquired Illness or Injury  Outcome: Ongoing, Progressing  Intervention: Identify and Manage Fall Risk  Recent Flowsheet Documentation  Taken 5/17/2022 0230 by Ramo Bray, RN  Safety Promotion/Fall Prevention: safety round/check completed  Taken 5/17/2022 0130 by Ramo Bray RN  Safety Promotion/Fall Prevention: safety round/check completed  Taken 5/17/2022 0030 by Ramo Bray, RN  Safety Promotion/Fall Prevention:   safety round/check completed   room organization consistent   nonskid shoes/slippers when out of bed   fall prevention program maintained   clutter free environment maintained  Taken 5/16/2022 2230 by Ramo Bray, RN  Safety Promotion/Fall Prevention: safety round/check completed  Taken  5/16/2022 2130 by Ramo Bray RN  Safety Promotion/Fall Prevention: safety round/check completed  Taken 5/16/2022 2030 by Ramo Bray RN  Safety Promotion/Fall Prevention: safety round/check completed  Taken 5/16/2022 1930 by Ramo Bray RN  Safety Promotion/Fall Prevention:   safety round/check completed   room organization consistent   nonskid shoes/slippers when out of bed   fall prevention program maintained   clutter free environment maintained  Intervention: Prevent and Manage VTE (Venous Thromboembolism) Risk  Recent Flowsheet Documentation  Taken 5/16/2022 1930 by Ramo Bray RN  Activity Management: up ad eileen  Intervention: Prevent Infection  Recent Flowsheet Documentation  Taken 5/16/2022 1930 by Ramo Bray RN  Infection Prevention:   visitors restricted/screened   single patient room provided   hand hygiene promoted   equipment surfaces disinfected   personal protective equipment utilized   rest/sleep promoted  Goal: Optimal Comfort and Wellbeing  Outcome: Ongoing, Progressing  Intervention: Provide Person-Centered Care  Recent Flowsheet Documentation  Taken 5/16/2022 1930 by Ramo Bray RN  Trust Relationship/Rapport:   care explained   choices provided   emotional support provided   empathic listening provided   questions answered   questions encouraged   reassurance provided   thoughts/feelings acknowledged  Goal: Readiness for Transition of Care  Outcome: Ongoing, Progressing  Intervention: Mutually Develop Transition Plan  Recent Flowsheet Documentation  Taken 5/16/2022 2324 by Ramo Bray RN  Transportation Anticipated: family or friend will provide  Transportation Concerns: none  Patient/Family Anticipated Services at Transition: none  Patient/Family Anticipates Transition to: home with family   Goal Outcome Evaluation:

## 2022-05-17 NOTE — PLAN OF CARE
Problem: Adult Inpatient Plan of Care  Goal: Plan of Care Review  Outcome: Ongoing, Progressing  Goal: Patient-Specific Goal (Individualized)  Outcome: Ongoing, Progressing  Flowsheets (Taken 5/17/2022 0301)  Patient-Specific Goals (Include Timeframe): Vital signs stable. Breast milk and formula feeding.  Goal: Absence of Hospital-Acquired Illness or Injury  Outcome: Ongoing, Progressing  Intervention: Identify and Manage Fall Risk  Recent Flowsheet Documentation  Taken 5/17/2022 0230 by Ramo Bary RN  Safety Promotion/Fall Prevention: safety round/check completed  Taken 5/17/2022 0130 by Ramo Bray RN  Safety Promotion/Fall Prevention: safety round/check completed  Taken 5/17/2022 0030 by Ramo Bray RN  Safety Promotion/Fall Prevention:   safety round/check completed   room organization consistent   nonskid shoes/slippers when out of bed   fall prevention program maintained   clutter free environment maintained  Taken 5/16/2022 2230 by Ramo Bray RN  Safety Promotion/Fall Prevention: safety round/check completed  Taken 5/16/2022 2130 by Ramo Bray RN  Safety Promotion/Fall Prevention: safety round/check completed  Taken 5/16/2022 2030 by Ramo Bray RN  Safety Promotion/Fall Prevention: safety round/check completed  Taken 5/16/2022 1930 by Ramo Bray RN  Safety Promotion/Fall Prevention:   safety round/check completed   room organization consistent   nonskid shoes/slippers when out of bed   fall prevention program maintained   clutter free environment maintained  Intervention: Prevent and Manage VTE (Venous Thromboembolism) Risk  Recent Flowsheet Documentation  Taken 5/16/2022 1930 by Ramo Bray RN  Activity Management: up ad eileen  Intervention: Prevent Infection  Recent Flowsheet Documentation  Taken 5/16/2022 1930 by Ramo Bray RN  Infection Prevention:   visitors restricted/screened   single patient room provided   hand hygiene promoted   equipment surfaces  disinfected   personal protective equipment utilized   rest/sleep promoted  Goal: Optimal Comfort and Wellbeing  Outcome: Ongoing, Progressing  Intervention: Provide Person-Centered Care  Recent Flowsheet Documentation  Taken 5/16/2022 1930 by Ramo Bray, RN  Trust Relationship/Rapport:   care explained   choices provided   emotional support provided   empathic listening provided   questions answered   questions encouraged   reassurance provided   thoughts/feelings acknowledged  Goal: Readiness for Transition of Care  Outcome: Ongoing, Progressing  Intervention: Mutually Develop Transition Plan  Recent Flowsheet Documentation  Taken 5/16/2022 2324 by Ramo Bray, RN  Transportation Anticipated: family or friend will provide  Transportation Concerns: none  Patient/Family Anticipated Services at Transition: none  Patient/Family Anticipates Transition to: home with family   Goal Outcome Evaluation:

## 2022-05-18 VITALS
WEIGHT: 255.8 LBS | RESPIRATION RATE: 16 BRPM | TEMPERATURE: 98 F | OXYGEN SATURATION: 97 % | BODY MASS INDEX: 42.62 KG/M2 | HEART RATE: 85 BPM | HEIGHT: 65 IN | DIASTOLIC BLOOD PRESSURE: 81 MMHG | SYSTOLIC BLOOD PRESSURE: 119 MMHG

## 2022-05-18 LAB — GLUCOSE BLDC GLUCOMTR-MCNC: 116 MG/DL (ref 70–130)

## 2022-05-18 PROCEDURE — 82962 GLUCOSE BLOOD TEST: CPT

## 2022-05-18 RX ORDER — IBUPROFEN 600 MG/1
600 TABLET ORAL EVERY 6 HOURS PRN
Qty: 30 TABLET | Refills: 0 | Status: SHIPPED | OUTPATIENT
Start: 2022-05-18 | End: 2022-09-20

## 2022-05-18 RX ORDER — DOCUSATE SODIUM 100 MG/1
100 CAPSULE, LIQUID FILLED ORAL 2 TIMES DAILY
Qty: 60 CAPSULE | Refills: 1 | Status: SHIPPED | OUTPATIENT
Start: 2022-05-18 | End: 2022-09-20

## 2022-05-18 RX ORDER — ACETAMINOPHEN 500 MG
1000 TABLET ORAL EVERY 8 HOURS PRN
Qty: 30 TABLET | Refills: 0 | Status: SHIPPED | OUTPATIENT
Start: 2022-05-18 | End: 2022-09-20

## 2022-05-18 RX ADMIN — PRENATAL VITAMINS-IRON FUMARATE 27 MG IRON-FOLIC ACID 0.8 MG TABLET 1 TABLET: at 08:50

## 2022-05-18 RX ADMIN — FERROUS SULFATE TAB EC 324 MG (65 MG FE EQUIVALENT) 324 MG: 324 (65 FE) TABLET DELAYED RESPONSE at 08:50

## 2022-05-18 RX ADMIN — IBUPROFEN 600 MG: 600 TABLET ORAL at 08:50

## 2022-05-18 RX ADMIN — DOCUSATE SODIUM 100 MG: 100 CAPSULE, LIQUID FILLED ORAL at 08:50

## 2022-05-18 NOTE — DISCHARGE SUMMARY
Discharge Summary     Vasquez Jarvis  : 1990  MRN: [id number]  CSN: [id number]    Date of Admission: 5/15/2022   Date of Discharge:  2022   Delivering Physician: Ramirez Flores        Admission Diagnosis: Encounter for induction of labor [Z34.90]  Pregnancy [Z34.90]   Discharge Diagnosis: 1. Same as above plus  2. Pregnancy at 37w1d - delivered    3.   Anemia   Procedures: 2022  - Vaginal, Spontaneous       Hospital Course  Patient is a 31 y.o.  who at 37w1d had a vaginal birth.  Her postpartum course was without complications.  On PPD #2 she was ready for discharge.  She had normal lochia and pain well controlled with oral medications.    Infant  male  fetus weighing 3685 g (8 lb 2 oz)   Apgars -  8 @ 1 minute /  9 @ 5 minutes.    Discharge labs  Lab Results   Component Value Date    WBC 9.54 2022    HGB 10.5 (L) 2022    HCT 31.6 (L) 2022     2022       Discharge Medications     Discharge Medications      New Medications      Instructions Start Date   acetaminophen 500 MG tablet  Commonly known as: TYLENOL   1,000 mg, Oral, Every 8 Hours PRN      docusate sodium 100 MG capsule   100 mg, Oral, 2 Times Daily      ibuprofen 600 MG tablet  Commonly known as: ADVIL,MOTRIN   600 mg, Oral, Every 6 Hours PRN         Continue These Medications      Instructions Start Date   ferrous sulfate 325 (65 FE) MG tablet   325 mg, Oral, 2 Times Daily Before Meals      glucose monitor monitoring kit   1 each, Does not apply, As Needed      Lancets Thin misc   Fasting and 1 hour after meals (4 times daily)      ONE TOUCH ULTRA 2 w/Device kit   No dose, route, or frequency recorded.      prenatal vitamin 27-0.8 27-0.8 MG tablet tablet   Oral, Daily         Stop These Medications    glucose blood test strip     phenazopyridine 100 MG tablet  Commonly known as: Pyridium     ProAir  (90 Base) MCG/ACT inhaler  Generic drug: albuterol sulfate HFA        ASK your  doctor about these medications      Instructions Start Date   glyburide 5 MG tablet  Commonly known as: DIAbeta   7.5 mg, Oral, Nightly             Discharge Disposition Home or Self Care   Condition on Discharge: good   Follow-up: 6 weeks         Debra Cantrell PA-C  5/18/2022

## 2022-05-18 NOTE — PROGRESS NOTES
"Patient: Melvin Jarvis  * No surgery found *  Anesthesia type: Anesthesia type cannot be found on the log.    Patient location: Med Surgical Floor  Last vitals: /75 (BP Location: Right arm, Patient Position: Sitting)   Pulse 88   Temp 97.5 °F (36.4 °C) (Oral)   Resp 18   Ht 165.1 cm (65\")   Wt 116 kg (255 lb 12.8 oz)   LMP 09/02/2021   SpO2 97%   Breastfeeding Yes   BMI 42.57 kg/m²   Level of consciousness: awake, alert and oriented    Post-anesthesia pain: adequate analgesia  Airway patency: patent  Respiratory: unassisted  Cardiovascular: stable and blood pressure at baseline  Hydration: euvolemic    Anesthetic complications: no  "

## 2022-05-20 LAB
BH BB BLOOD EXPIRATION DATE: NORMAL
BH BB BLOOD EXPIRATION DATE: NORMAL
BH BB BLOOD TYPE BARCODE: 2800
BH BB BLOOD TYPE BARCODE: 600
BH BB DISPENSE STATUS: NORMAL
BH BB DISPENSE STATUS: NORMAL
BH BB PRODUCT CODE: NORMAL
BH BB PRODUCT CODE: NORMAL
BH BB UNIT NUMBER: NORMAL
BH BB UNIT NUMBER: NORMAL
CROSSMATCH INTERPRETATION: NORMAL
CROSSMATCH INTERPRETATION: NORMAL
UNIT  ABO: NORMAL
UNIT  ABO: NORMAL
UNIT  RH: NORMAL
UNIT  RH: NORMAL

## 2022-06-20 ENCOUNTER — TELEPHONE (OUTPATIENT)
Dept: OBSTETRICS AND GYNECOLOGY | Facility: CLINIC | Age: 32
End: 2022-06-20

## 2022-08-08 NOTE — PROGRESS NOTES
Chief Complaint   Patient presents with   • Routine Prenatal Visit     Routine prenatal visit and Growth scan. No problems or complaints        HPI:   , 32w1d gestation reports doing well    ROS:  See Prenatal Episode/Flowsheet  /68   Wt 114 kg (250 lb 9.6 oz)   LMP 2021   BMI 41.70 kg/m²      EXAM:  EXTREMITIES:  No swelling-See Prenatal Episode/Flowsheet    ABDOMEN:  FHTs/Movement noted-See Prenatal Episode/Flowsheet    URINE GLUCOSE/PROTEIN:  See Prenatal Episode/Flowsheet    PELVIC EXAM:  See Prenatal Episode/Flowsheet  CV:  Lungs:  GYN:    MDM:    Lab Results   Component Value Date    HGB 11.0 (L) 2022    RUBELLAABIGG <0.90 (L) 10/27/2021    HEPBSAG Negative 10/27/2021    ABO AB 10/27/2021    RH Negative 10/27/2021    ABSCRN See Final Results 10/27/2021    ABSCRN Positive (A) 10/27/2021    [id number] Non Reactive 10/27/2021    HEPCVIRUSABY <0.1 10/27/2021    BDD2GMKB 212 (H) 2022    URINECX Final report 2021       U/S: Overall growth 72 percentile.  Symmetric.  TIMMY 10.76.  Active male fetus.  Posterior placenta.    1. IUP 32w1d  2. Routine care   3.  Gestational diabetes: Patient brought sugars in for review: Her 1 hours overall look pretty good if you elevations though low in the 140s.  Her fastings are persistently elevated.  I start glyburide 2.5 mg p.o. nightly ambulatory referral to diabetic counseling is in place.  
(1) Other Medications/None

## 2022-09-20 ENCOUNTER — REFERRAL TRIAGE (OUTPATIENT)
Dept: LABOR AND DELIVERY | Facility: HOSPITAL | Age: 32
End: 2022-09-20

## 2022-09-20 ENCOUNTER — INITIAL PRENATAL (OUTPATIENT)
Dept: OBSTETRICS AND GYNECOLOGY | Facility: CLINIC | Age: 32
End: 2022-09-20

## 2022-09-20 VITALS — WEIGHT: 237 LBS | SYSTOLIC BLOOD PRESSURE: 122 MMHG | DIASTOLIC BLOOD PRESSURE: 70 MMHG | BODY MASS INDEX: 39.44 KG/M2

## 2022-09-20 DIAGNOSIS — O36.80X0 ENCOUNTER TO DETERMINE FETAL VIABILITY OF PREGNANCY, SINGLE OR UNSPECIFIED FETUS: Primary | ICD-10-CM

## 2022-09-20 DIAGNOSIS — Z34.81 ENCOUNTER FOR SUPERVISION OF OTHER NORMAL PREGNANCY IN FIRST TRIMESTER: ICD-10-CM

## 2022-09-20 PROBLEM — O24.419 GESTATIONAL DIABETES MELLITUS, CLASS A2: Status: RESOLVED | Noted: 2022-04-25 | Resolved: 2022-09-20

## 2022-09-20 PROBLEM — Z34.90 ENCOUNTER FOR INDUCTION OF LABOR: Status: RESOLVED | Noted: 2022-05-16 | Resolved: 2022-09-20

## 2022-09-20 PROBLEM — O13.3 GESTATIONAL HTN, THIRD TRIMESTER: Status: RESOLVED | Noted: 2022-05-16 | Resolved: 2022-09-20

## 2022-09-20 PROCEDURE — 99213 OFFICE O/P EST LOW 20 MIN: CPT | Performed by: MIDWIFE

## 2022-09-20 NOTE — PROGRESS NOTES
Subjective     Chief Complaint   Patient presents with   • Initial Prenatal Visit     New OB, LMP 22, DAVID set as 23, 11w 2d, No Complaints/concerns        Melvin Jarvis is a 32 y.o. .  No LMP recorded. Patient is pregnant..  She presents to be seen to initiate prenatal care with our practice. Her last pregnancy was complicated by GDM A2-oral. She also had gestational HTN. She denies any problems today. Her  is with her today.    Past Medical History:   Diagnosis Date   • GDM (gestational diabetes mellitus), class A1 2022   • Gestational HTN, third trimester 2022   • Mild intermittent asthma with exacerbation 2022   • PCOS (polycystic ovarian syndrome)    • Polycystic ovary syndrome    • Recurrent pregnancy loss, antepartum condition or complication    • Rh incompatibility      Social History     Socioeconomic History   • Marital status:      Spouse name: Kristine   • Number of children: 1   • Years of education: 13   Tobacco Use   • Smoking status: Former Smoker     Packs/day: 1.50     Years: 15.00     Pack years: 22.50     Types: Cigarettes, Electronic Cigarette     Quit date: 2021     Years since quittin.5   • Smokeless tobacco: Never Used   Vaping Use   • Vaping Use: Every day   • Substances: Nicotine   • Devices: Disposable   • Passive vaping exposure: Yes   Substance and Sexual Activity   • Alcohol use: Never   • Drug use: Never   • Sexual activity: Yes     Partners: Male     Birth control/protection: None     Comment:          The following portions of the patient's history were reviewed and updated as appropriate:vital signs, allergies, current medications, past medical history, past social history, past surgical history and problem list.    Review of Systems -   /70   Wt 108 kg (237 lb)   BMI 39.44 kg/m²   Gastrointestinal: minimal nausea and vomiting, denies constipation   Genitourinary: Frequency - denies urgency or burning  with urination  All other systems reviewed and are negative    Objective     Physical Exam  Constitutional   The patient is alert, well developed & well nourished.   Neck   The neck is supple and the trachea is midline. The thyroid is not enlarged and there are no palpable nodules.   Respiratory  The patient is relaxed and breathes without effort. Lungs CTAB  Cardiovascular  Regular rate and rhythm without murmur -  Negative LE pitting edema  Gastrointestinal   The abdomen is soft and non tender. No hepatosplenomegaly  Genitourinary   - External Genitalia without erythema, lesions, or masses  -Vagina - There is no abnormal vaginal discharge.   -Cervix without discharge  Negative cervical motion tenderness   Uterus - uterine body size is approximate to dates  Adnexa structures are without masses  Perineum is without inflammation or lesion  Skin  Normal color. No rashes or lesions  Extremities  Full ROM. No rashes or edema  Psychiatric  The patient is oriented to person, place, and time. Speech is fluent and words are clear    Imaging   Pelvic ultrasound report  11w2d, + FHT      Assessment & Plan     ASSESSMENT  1. IUP at 11w2d   2. Closely spaced pregnancies  3. First trimester discomforts of pregnancy.     PLAN  1. Tests ordered today:  Orders Placed This Encounter   Procedures   • US Ob Transvaginal     Order Specific Question:   Reason for Exam:     Answer:   NOB, dates, viability   • OB Panel With HIV     Order Specific Question:   Release to patient     Answer:   Routine Release   • [id number] PLUS Core+SCA - Blood,     Order Specific Question:   [facility] Date of last menstrual period or estimated date of delivery (corresponding to calculation method):     Answer:   4/9/2023     Order Specific Question:   [facility] Gestational age calculation method:     Answer:   DAVID,EDC   • Comprehensive Metabolic Panel     Order Specific Question:   Release to patient     Answer:   Routine Release   • Hemoglobin A1c     Order  Specific Question:   Release to patient     Answer:   Routine Release     2. Medications prescribed today:  No orders of the defined types were placed in this encounter.    3. Information reviewed: exercise in pregnancy, nutrition in pregnancy, weight gain in pregnancy, work and travel restrictions during pregnancy, list of OTC medications acceptable in pregnancy and call coverage groups  4. Genetic testing reviewed: Cystic Fibrosis Screen  5. Marti products, Vit B6 supp, Unisom, or seabands PRN      Follow up: 4 week(s)         This note was electronically signed.    Crystal Craven CNM  9/20/2022

## 2022-09-24 LAB
ABO GROUP BLD: NORMAL
ALBUMIN SERPL-MCNC: 4.3 G/DL (ref 3.8–4.8)
ALBUMIN/GLOB SERPL: 1.6 {RATIO} (ref 1.2–2.2)
ALP SERPL-CCNC: 59 IU/L (ref 44–121)
ALT SERPL-CCNC: 31 IU/L (ref 0–32)
AST SERPL-CCNC: 20 IU/L (ref 0–40)
BASOPHILS # BLD AUTO: 0 X10E3/UL (ref 0–0.2)
BASOPHILS NFR BLD AUTO: 0 %
BILIRUB SERPL-MCNC: 0.3 MG/DL (ref 0–1.2)
BLD GP AB SCN SERPL QL: NEGATIVE
BUN SERPL-MCNC: 9 MG/DL (ref 6–20)
BUN/CREAT SERPL: 16 (ref 9–23)
CALCIUM SERPL-MCNC: 8.9 MG/DL (ref 8.7–10.2)
CFDNA.FET/CFDNA.TOTAL SFR FETUS: NORMAL %
CHLORIDE SERPL-SCNC: 104 MMOL/L (ref 96–106)
CITATION REF LAB TEST: NORMAL
CO2 SERPL-SCNC: 18 MMOL/L (ref 20–29)
CREAT SERPL-MCNC: 0.58 MG/DL (ref 0.57–1)
EGFRCR SERPLBLD CKD-EPI 2021: 123 ML/MIN/1.73
EOSINOPHIL # BLD AUTO: 0.1 X10E3/UL (ref 0–0.4)
EOSINOPHIL NFR BLD AUTO: 1 %
ERYTHROCYTE [DISTWIDTH] IN BLOOD BY AUTOMATED COUNT: 13.6 % (ref 11.7–15.4)
FET 13+18+21+X+Y ANEUP PLAS.CFDNA: NEGATIVE
FET CHR 21 TS PLAS.CFDNA QL: NEGATIVE
FET MS X RISK WBC.DNA+CFDNA QL: NOT DETECTED
FET SEX PLAS.CFDNA DOSAGE CFDNA: NORMAL
FET TS 13 RISK PLAS.CFDNA QL: NEGATIVE
FET TS 18 RISK WBC.DNA+CFDNA QL: NEGATIVE
FET X + Y ANEUP RISK PLAS.CFDNA SEQ-IMP: NOT DETECTED
GA EST FROM CONCEPTION DATE: NORMAL D
GESTATIONAL AGE > 9:: YES
GLOBULIN SER CALC-MCNC: 2.7 G/DL (ref 1.5–4.5)
GLUCOSE SERPL-MCNC: 95 MG/DL (ref 65–99)
HBA1C MFR BLD: 5.2 % (ref 4.8–5.6)
HBV SURFACE AG SERPL QL IA: NEGATIVE
HCT VFR BLD AUTO: 38.5 % (ref 34–46.6)
HCV AB S/CO SERPL IA: <0.1 S/CO RATIO (ref 0–0.9)
HGB BLD-MCNC: 12.8 G/DL (ref 11.1–15.9)
HIV 1+2 AB+HIV1 P24 AG SERPL QL IA: NON REACTIVE
IMM GRANULOCYTES # BLD AUTO: 0 X10E3/UL (ref 0–0.1)
IMM GRANULOCYTES NFR BLD AUTO: 0 %
LAB DIRECTOR NAME PROVIDER: NORMAL
LAB DIRECTOR NAME PROVIDER: NORMAL
LABORATORY COMMENT REPORT: NORMAL
LIMITATIONS OF THE TEST: NORMAL
LYMPHOCYTES # BLD AUTO: 1.8 X10E3/UL (ref 0.7–3.1)
LYMPHOCYTES NFR BLD AUTO: 23 %
MCH RBC QN AUTO: 29 PG (ref 26.6–33)
MCHC RBC AUTO-ENTMCNC: 33.2 G/DL (ref 31.5–35.7)
MCV RBC AUTO: 87 FL (ref 79–97)
MONOCYTES # BLD AUTO: 0.5 X10E3/UL (ref 0.1–0.9)
MONOCYTES NFR BLD AUTO: 6 %
NEGATIVE PREDICTIVE VALUE: NORMAL
NEUTROPHILS # BLD AUTO: 5.4 X10E3/UL (ref 1.4–7)
NEUTROPHILS NFR BLD AUTO: 70 %
NOTE: NORMAL
PERFORMANCE CHARACTERISTICS: NORMAL
PLATELET # BLD AUTO: 223 X10E3/UL (ref 150–450)
POSITIVE PREDICTIVE VALUE: NORMAL
POTASSIUM SERPL-SCNC: 4 MMOL/L (ref 3.5–5.2)
PROT SERPL-MCNC: 7 G/DL (ref 6–8.5)
RBC # BLD AUTO: 4.42 X10E6/UL (ref 3.77–5.28)
REF LAB TEST METHOD: NORMAL
RH BLD: NEGATIVE
RPR SER QL: NON REACTIVE
RUBV IGG SERPL IA-ACNC: 5.48 INDEX
SODIUM SERPL-SCNC: 138 MMOL/L (ref 134–144)
TEST PERFORMANCE INFO SPEC: NORMAL
WBC # BLD AUTO: 7.9 X10E3/UL (ref 3.4–10.8)

## 2022-09-27 LAB — REF LAB TEST METHOD: NORMAL

## 2022-10-17 ENCOUNTER — ROUTINE PRENATAL (OUTPATIENT)
Dept: OBSTETRICS AND GYNECOLOGY | Facility: CLINIC | Age: 32
End: 2022-10-17

## 2022-10-17 ENCOUNTER — TELEPHONE (OUTPATIENT)
Dept: OBSTETRICS AND GYNECOLOGY | Facility: CLINIC | Age: 32
End: 2022-10-17

## 2022-10-17 VITALS — WEIGHT: 241 LBS | DIASTOLIC BLOOD PRESSURE: 70 MMHG | SYSTOLIC BLOOD PRESSURE: 122 MMHG | BODY MASS INDEX: 40.1 KG/M2

## 2022-10-17 DIAGNOSIS — O09.899 SHORT INTERVAL BETWEEN PREGNANCIES COMPLICATING PREGNANCY, ANTEPARTUM: ICD-10-CM

## 2022-10-17 DIAGNOSIS — Z87.59 HISTORY OF GESTATIONAL HYPERTENSION: ICD-10-CM

## 2022-10-17 DIAGNOSIS — O09.292 HISTORY OF GESTATIONAL DIABETES IN PRIOR PREGNANCY, CURRENTLY PREGNANT IN SECOND TRIMESTER: ICD-10-CM

## 2022-10-17 DIAGNOSIS — Z86.32 HISTORY OF GESTATIONAL DIABETES IN PRIOR PREGNANCY, CURRENTLY PREGNANT IN SECOND TRIMESTER: ICD-10-CM

## 2022-10-17 DIAGNOSIS — O09.92 ENCOUNTER FOR SUPERVISION OF HIGH RISK PREGNANCY IN SECOND TRIMESTER, ANTEPARTUM: Primary | ICD-10-CM

## 2022-10-17 PROCEDURE — 99214 OFFICE O/P EST MOD 30 MIN: CPT | Performed by: OBSTETRICS & GYNECOLOGY

## 2022-10-17 RX ORDER — ASPIRIN 81 MG/1
81 TABLET ORAL DAILY
Qty: 60 TABLET | Refills: 3 | Status: SHIPPED | OUTPATIENT
Start: 2022-10-17 | End: 2023-03-27 | Stop reason: HOSPADM

## 2022-10-17 NOTE — TELEPHONE ENCOUNTER
----- Message from Paige Zaldivar MD sent at 10/17/2022 11:58 AM EDT -----  Please inform patient that I have sent in a prescription for baby aspirin for patient to start now given her history.

## 2022-10-17 NOTE — PROGRESS NOTES
Chief Complaint  Routine Prenatal Visit (No complaints. )    History of Present Illness:  Melvin is a  currently at 15w1d who presents today with no complaints.  Patient does report positive fetal movement yesterday.  She denies any vaginal bleeding or spotting.  Patient is Rh-.  Patient did have previous pregnancy complicated with gestational hypertension as well as gestational diabetes.  Patient was on oral medication.  Patient did have her new OB labs as well as genetic screening as noted.  She declines MSAFP today.  Patient denies any nausea or emesis.  She denies any headaches or visual disturbances.  She has been taking her prenatal vitamins.    Exam:  Vitals:  See prenatal flowsheet as noted and reviewed  General: Alert, cooperative, and does not appear in any distress  Abdomen:   See prenatal flowsheet as noted and reviewed    Uterus gravid, non-tender; no palpable masses    No guarding or rebound tenderness  Pelvic:  See prenatal flowsheet as noted and reviewed  Ext:  See prenatal flowsheet as noted and reviewed    Moves extremities well, no cyanosis and no redness  Urine:  See prenatal flowsheet as noted and reviewed    Data Review:  The following data was reviewed by: Paige Zaldivar MD on 10/17/2022:  Prenatal Labs:  Lab Results   Component Value Date    HGB 12.8 2022    RUBELLAABIGG 5.48 2022    HEPBSAG Negative 2022    ABO AB 2022    RH Negative 2022    ABSCRN Negative 2022    UZT5WDS4 Non Reactive 2022    HEPCVIRUSABY <0.1 2022    YLR3CXOP 212 (H) 2022    STREPGPB Negative 2022    URINECX No growth 2022       Initial Prenatal on 2022   Component Date Value   • Reference Lab Report 2022                      Value:[facility]  [address] [place] [address]  Phone: [phone] or [phone]  Fax: [phone]  Celestino Bustamante M.D., Medical Director    PATIENT NAME                            LABORATORY NO.  [id number]  JADA CLARK                       [id number]  [id number]                         AGE              SEX  SSN           CLIENT REF #  [facility]                32      1990  F    [id number]   [id number]    [address] [facility] 3  REQUESTING MMichaelD.     ATTENDING [place] [address]                 DATE COLLECTED      DATE RECEIVED      DATE REPORTED  2022    ThinPrep Pap with [facility]    DIAGNOSIS:  Epithelial cell abnormality. (ASC) Atypical squamous cells of undetermined  significance.    Recommend correlation with HPV DNA testing if not ordered on this current  Thin Prep.    Professional interpretation                           rendered by Ashleigh Mitchell D.O., F.C.A.P. at  [facility] [employer], [address] [place] [address].  SPECIMEN ADEQUACY:  SATISFACTORY FOR EVALUATION  Transformation zone is present.  SOURCE OF SPECIMEN:  CERVICAL  SLIDES:  1  CLINICAL HISTORY:  Encounter for supervision of other normal pregnancy in first trimester    HPV  HR-HPV POOL: Negative    The Aptima HPV assay is an in vitro nucleic acid amplification test for the  qualitative detection of E6/E7 viral messenger RNA from 14 high risk types of  HPV in cervical specimens. The high risk HPV types detected include: 16, 18,  31, 33, 35, 39, 45, 51, 52, 56, 58, 59, 66, 68    Chlamydia / Gonorrhea  CHLAMYDIA TRACHOMATIS: Negative  NEISSERIA GONORRHOEAE: Negative    The Aptima Combo 2 assay is a target amplification nucleic acid probe test that  utilizes target capture for the in vitro qualitative detection and differentiation of  ribosomal RNA from Chlamydia trachomatis and Neisseria gonorrhoeae to aid  in the diagnosis of chlamdial                           and gonococcal disease using the [place] system.    CYTOTECHNOLOGIST:      CHING BOWSER(ASCP)                              REVIEWED, DIAGNOSED AND  ELECTRONICALLY SIGNED BY:      Ashleigh Mitchell D.O., F.C.A.P.    CPT CODES:  29980, 30041, 00664, 08920, 00655     • Hepatitis B Surface Ag 09/20/2022 Negative    • Hep C Virus Ab 09/20/2022 <0.1    • RPR 09/20/2022 Non Reactive    • Rubella Antibodies, IgG 09/20/2022 5.48    • ABO Type 09/20/2022 AB    • Rh Factor 09/20/2022 Negative    • Antibody Screen 09/20/2022 Negative    • HIV Screen 4th Gen w/RFX* 09/20/2022 Non Reactive    • WBC 09/20/2022 7.9    • RBC 09/20/2022 4.42    • Hemoglobin 09/20/2022 12.8    • Hematocrit 09/20/2022 38.5    • MCV 09/20/2022 87    • MCH 09/20/2022 29.0    • MCHC 09/20/2022 33.2    • RDW 09/20/2022 13.6    • Platelets 09/20/2022 223    • Neutrophil Rel % 09/20/2022 70    • Lymphocyte Rel % 09/20/2022 23    • Monocyte Rel % 09/20/2022 6    • Eosinophil Rel % 09/20/2022 1    • Basophil Rel % 09/20/2022 0    • Neutrophils Absolute 09/20/2022 5.4    • Lymphocytes Absolute 09/20/2022 1.8    • Monocytes Absolute 09/20/2022 0.5    • Eosinophils Absolute 09/20/2022 0.1    • Basophils Absolute 09/20/2022 0.0    • Immature Granulocyte Rel* 09/20/2022 0    • Immature Grans Absolute 09/20/2022 0.0    • Gestation 09/20/2022 Connelly    • Fetal Fraction 09/20/2022 8%    • Gestational Age >9: 09/20/2022 Yes    • Result 09/20/2022 Negative    •  Comments 09/20/2022 Comment    • Approved By 09/20/2022 Comment    • TRISOMY 21 (DOWN SYNDROM* 09/20/2022 Negative    • TRISOMY 18 (HAMMONDS SYND* 09/20/2022 Negative    • TRISOMY 13 (PATAU SYNDRO* 09/20/2022 Negative    • FETAL SEX 09/20/2022 Comment    • MONOSOMY X (ESQUEDA SYNDR* 09/20/2022 Not Detected    • XYY (HIRSCH SYNDROME) 09/20/2022 Not Detected    • XXY (KLINEFELTER SYNDROM* 09/20/2022 Not Detected    • XXX (TRIPLE X SYNDROME) 09/20/2022 Not Detected    • NEGATIVE PREDICTIVE VALUE 09/20/2022 Note    • POSITIVE PREDICTIVE VALUE 09/20/2022 N/A    • About The Test 09/20/2022 Comment    • Test Method  2022 Comment    • Performance 2022 Comment    • PERFORMANCE CHARACTERIST* 2022 Note    • Limitations of the Test 2022 Comment    • Note 2022 Comment    • References 2022 Comment    • Glucose 2022 95    • BUN 2022 9    • Creatinine 2022 0.58    • EGFR Result 2022 123    • BUN/Creatinine Ratio 2022 16    • Sodium 2022 138    • Potassium 2022 4.0    • Chloride 2022 104    • Total CO2 2022 18 (L)    • Calcium 2022 8.9    • Total Protein 2022 7.0    • Albumin 2022 4.3    • Globulin 2022 2.7    • A/G Ratio 2022 1.6    • Total Bilirubin 2022 0.3    • Alkaline Phosphatase 2022 59    • AST (SGOT) 2022 20    • ALT (SGPT) 2022 31    • Hemoglobin A1C 2022 5.2      Imaging:  US Ob Transvaginal  11-week 2-day IUP with positive cardiac activity.  Normal cervix and   adnexa.     Medical Records:  None    Assessment and Plan:  Problem List Items Addressed This Visit    None  Visit Diagnoses     Encounter for supervision of high risk pregnancy in second trimester, antepartum    -  Primary  Topics discussed:     ab precautions  genetic screening - Today we discussed genetic testing.  She is aware that the MSAFP-4 and NIPT - [id number] is a screening test.  A screening test is not a diagnostic test.  This means that a negative test does not guarantee an unaffected fetus and a positive test does not mean the fetus has the condition for which the test is being performed.  If the test returns positive, a diagnostic test should be consider to determine if the fetus in fact has the condition.  After considering the options previously presented, she is not interested in having genetic testing performed.  kick counts and fetal movement  PIH precautions   labor signs and symptoms  Patient declines MSAFP today.  Anatomic scan next visit as noted.    History of gestational diabetes in prior  pregnancy, currently pregnant in second trimester      Patient will need Glucola early.  Her hemoglobin A1c was normal.  Prescription is given for baby aspirin.    History of gestational hypertension      Patient with history of gestational hypertension.  Prescription is given for baby aspirin.  Instructions and precautions are given.  Patient is to follow-up as discussed.    Short interval between pregnancies complicating pregnancy, antepartum            Follow Up/Instructions:  Follow up as scheduled.  Patient was given instructions and counseling regarding her condition or for health maintenance advice. Please see specific information pulled into the AVS if appropriate.     Note: Speech recognition transcription software may have been used to dictate portions of this document.  An attempt at proofreading has been made though minor errors in transcription may still be present.    This note was electronically signed.  Paige Zaldivar M.D.

## 2022-10-31 ENCOUNTER — PATIENT OUTREACH (OUTPATIENT)
Dept: LABOR AND DELIVERY | Facility: HOSPITAL | Age: 32
End: 2022-10-31

## 2022-10-31 NOTE — OUTREACH NOTE
[facility]  Enrollment    Current Estimated Gestational Age: 17w1d    Questions/Answers    Flowsheet Row Responses   Would like to participate? No          Patient declined.     Peterson Hollis RN  Maternity Nurse Navigator    10/31/2022, 14:59 EDT

## 2022-11-07 ENCOUNTER — ROUTINE PRENATAL (OUTPATIENT)
Dept: OBSTETRICS AND GYNECOLOGY | Facility: CLINIC | Age: 32
End: 2022-11-07

## 2022-11-07 VITALS — WEIGHT: 248 LBS | SYSTOLIC BLOOD PRESSURE: 126 MMHG | BODY MASS INDEX: 41.27 KG/M2 | DIASTOLIC BLOOD PRESSURE: 68 MMHG

## 2022-11-07 DIAGNOSIS — O44.40 LOW-LYING PLACENTA: ICD-10-CM

## 2022-11-07 DIAGNOSIS — Z34.82 ENCOUNTER FOR SUPERVISION OF OTHER NORMAL PREGNANCY IN SECOND TRIMESTER: Primary | ICD-10-CM

## 2022-11-07 PROCEDURE — 99213 OFFICE O/P EST LOW 20 MIN: CPT | Performed by: MIDWIFE

## 2022-11-07 NOTE — PROGRESS NOTES
Chief Complaint   Patient presents with   • Routine Prenatal Visit     No Complaints/concerns        HPI: Melvin is a  currently at 18w1d here for prenatal visit who reports the following:  She has been feeling flutters. She denies any problems.                EXAM:     Vitals:    22 1057   BP: 126/68      Abdomen:   See prenatal flowsheet as noted and reviewed, soft, nontender   Pelvic:  See prenatal flowsheet as noted and reviewed   Urine:  See prenatal flowsheet as noted and reviewed    Lab Results   Component Value Date    ABO AB 2022    RH Negative 2022    ABSCRN Negative 2022       MDM:  Impression: Supervision of low risk pregnancy  Rh negative  Low lying placenta  Hx of GHTN and GDM   Tests done today: U/S for anatomic screening - anatomy completely seen today, placenta 1.6 cm from internal os   Topics discussed: kick counts and fetal movement  low-lying placenta/previa precautions  Reviewed OB labs   Tests next visit: GCT, TVS to assess placental location                RTO:                        4 weeks    This note was electronically signed.  Crystal Craven, RIVKA  2022

## 2022-12-05 ENCOUNTER — ROUTINE PRENATAL (OUTPATIENT)
Dept: OBSTETRICS AND GYNECOLOGY | Facility: CLINIC | Age: 32
End: 2022-12-05

## 2022-12-05 VITALS — SYSTOLIC BLOOD PRESSURE: 130 MMHG | DIASTOLIC BLOOD PRESSURE: 70 MMHG | WEIGHT: 249.4 LBS | BODY MASS INDEX: 41.5 KG/M2

## 2022-12-05 DIAGNOSIS — O44.40 LOW-LYING PLACENTA: ICD-10-CM

## 2022-12-05 DIAGNOSIS — Z3A.22 22 WEEKS GESTATION OF PREGNANCY: Primary | ICD-10-CM

## 2022-12-05 DIAGNOSIS — N88.3 SHORT CERVIX: ICD-10-CM

## 2022-12-05 LAB
BASOPHILS # BLD AUTO: 0.02 10*3/MM3 (ref 0–0.2)
BASOPHILS NFR BLD AUTO: 0.2 % (ref 0–1.5)
EOSINOPHIL # BLD AUTO: 0.08 10*3/MM3 (ref 0–0.4)
EOSINOPHIL NFR BLD AUTO: 0.8 % (ref 0.3–6.2)
ERYTHROCYTE [DISTWIDTH] IN BLOOD BY AUTOMATED COUNT: 13.9 % (ref 12.3–15.4)
GLUCOSE 1H P 50 G GLC PO SERPL-MCNC: 163 MG/DL (ref 65–139)
HCT VFR BLD AUTO: 35.1 % (ref 34–46.6)
HGB BLD-MCNC: 11.7 G/DL (ref 12–15.9)
IMM GRANULOCYTES # BLD AUTO: 0.04 10*3/MM3 (ref 0–0.05)
IMM GRANULOCYTES NFR BLD AUTO: 0.4 % (ref 0–0.5)
LYMPHOCYTES # BLD AUTO: 1.47 10*3/MM3 (ref 0.7–3.1)
LYMPHOCYTES NFR BLD AUTO: 15.6 % (ref 19.6–45.3)
MCH RBC QN AUTO: 30.1 PG (ref 26.6–33)
MCHC RBC AUTO-ENTMCNC: 33.3 G/DL (ref 31.5–35.7)
MCV RBC AUTO: 90.2 FL (ref 79–97)
MONOCYTES # BLD AUTO: 0.46 10*3/MM3 (ref 0.1–0.9)
MONOCYTES NFR BLD AUTO: 4.9 % (ref 5–12)
NEUTROPHILS # BLD AUTO: 7.37 10*3/MM3 (ref 1.7–7)
NEUTROPHILS NFR BLD AUTO: 78.1 % (ref 42.7–76)
NRBC BLD AUTO-RTO: 0 /100 WBC (ref 0–0.2)
PLATELET # BLD AUTO: 194 10*3/MM3 (ref 140–450)
RBC # BLD AUTO: 3.89 10*6/MM3 (ref 3.77–5.28)
WBC # BLD AUTO: 9.44 10*3/MM3 (ref 3.4–10.8)

## 2022-12-05 PROCEDURE — 99213 OFFICE O/P EST LOW 20 MIN: CPT | Performed by: OBSTETRICS & GYNECOLOGY

## 2022-12-05 RX ORDER — LANCETS 28 GAUGE
EACH MISCELLANEOUS
Qty: 120 EACH | Refills: 12 | Status: SHIPPED | OUTPATIENT
Start: 2022-12-05

## 2022-12-05 RX ORDER — VALACYCLOVIR HYDROCHLORIDE 1 G/1
1000 TABLET, FILM COATED ORAL EVERY 8 HOURS
COMMUNITY
Start: 2022-11-21 | End: 2022-12-12

## 2022-12-05 NOTE — PROGRESS NOTES
Chief Complaint   Patient presents with   • Routine Prenatal Visit     Prenatal visit with TVS and Glucola done today. No problems or concerns        HPI:   , 22w1d gestation reports doing well    ROS:  See Prenatal Episode/Flowsheet  /70   Wt 113 kg (249 lb 6.4 oz)   BMI 41.50 kg/m²      EXAM:  EXTREMITIES:  No swelling-See Prenatal Episode/Flowsheet    ABDOMEN:  FHTs/Movement noted-See Prenatal Episode/Flowsheet    URINE GLUCOSE/PROTEIN:  See Prenatal Episode/Flowsheet    PELVIC EXAM:  See Prenatal Episode/Flowsheet  CV:  Lungs:  GYN:    MDM:    Lab Results   Component Value Date    HGB 12.8 2022    RUBELLAABIGG 5.48 2022    HEPBSAG Negative 2022    ABO AB 2022    RH Negative 2022    ABSCRN Negative 2022    [id number] Non Reactive 2022    HEPCVIRUSABY <0.1 2022    RDY5ZQMN 212 (H) 2022    STREPGPB Negative 2022    URINECX No growth 2022       U/S: Active fetus.  Cervical length is 2 to 2.3 cm.  There is some funneling noted.  The posterior placenta is right directly at the internal os    1. IUP 22w1d  2. Routine care   3. Shortened cervix  4. Partial previa  5. Needs to check FSBS

## 2022-12-12 ENCOUNTER — OFFICE VISIT (OUTPATIENT)
Dept: OBSTETRICS AND GYNECOLOGY | Facility: HOSPITAL | Age: 32
End: 2022-12-12

## 2022-12-12 ENCOUNTER — HOSPITAL ENCOUNTER (OUTPATIENT)
Dept: WOMENS IMAGING | Facility: HOSPITAL | Age: 32
Discharge: HOME | End: 2022-12-12
Admitting: OBSTETRICS & GYNECOLOGY

## 2022-12-12 VITALS — SYSTOLIC BLOOD PRESSURE: 123 MMHG | BODY MASS INDEX: 41.64 KG/M2 | DIASTOLIC BLOOD PRESSURE: 65 MMHG | WEIGHT: 250.2 LBS

## 2022-12-12 DIAGNOSIS — Z3A.23 23 WEEKS GESTATION OF PREGNANCY: ICD-10-CM

## 2022-12-12 DIAGNOSIS — E28.2 PCOS (POLYCYSTIC OVARIAN SYNDROME): ICD-10-CM

## 2022-12-12 DIAGNOSIS — O09.299 HISTORY OF GESTATIONAL DIABETES IN PRIOR PREGNANCY, CURRENTLY PREGNANT: ICD-10-CM

## 2022-12-12 DIAGNOSIS — Z86.32 HISTORY OF GESTATIONAL DIABETES IN PRIOR PREGNANCY, CURRENTLY PREGNANT: ICD-10-CM

## 2022-12-12 DIAGNOSIS — O26.879 SHORT CERVIX AFFECTING PREGNANCY: ICD-10-CM

## 2022-12-12 DIAGNOSIS — O44.40 LOW-LYING PLACENTA: Primary | ICD-10-CM

## 2022-12-12 DIAGNOSIS — Z87.59 HISTORY OF GESTATIONAL HYPERTENSION: ICD-10-CM

## 2022-12-12 DIAGNOSIS — O09.899 SHORT INTERVAL BETWEEN PREGNANCIES AFFECTING PREGNANCY, ANTEPARTUM: ICD-10-CM

## 2022-12-12 DIAGNOSIS — O44.40 LOW-LYING PLACENTA: ICD-10-CM

## 2022-12-12 PROCEDURE — 99214 OFFICE O/P EST MOD 30 MIN: CPT | Performed by: OBSTETRICS & GYNECOLOGY

## 2022-12-12 PROCEDURE — 76811 OB US DETAILED SNGL FETUS: CPT

## 2022-12-12 PROCEDURE — 76817 TRANSVAGINAL US OBSTETRIC: CPT | Performed by: OBSTETRICS & GYNECOLOGY

## 2022-12-12 PROCEDURE — 76817 TRANSVAGINAL US OBSTETRIC: CPT

## 2022-12-12 PROCEDURE — 76811 OB US DETAILED SNGL FETUS: CPT | Performed by: OBSTETRICS & GYNECOLOGY

## 2022-12-12 RX ORDER — PROGESTERONE 200 MG/1
200 CAPSULE ORAL NIGHTLY
Qty: 30 CAPSULE | Refills: 6 | Status: SHIPPED | OUTPATIENT
Start: 2022-12-12 | End: 2023-02-27

## 2022-12-12 NOTE — PROGRESS NOTES
Patient seen in Maternal Fetal Medicine clinic today. Please see full note in under imaging tab of patient chart in Epic (Viewpoint report).    Manuela Bunch MD

## 2022-12-14 ENCOUNTER — ROUTINE PRENATAL (OUTPATIENT)
Dept: OBSTETRICS AND GYNECOLOGY | Facility: CLINIC | Age: 32
End: 2022-12-14

## 2022-12-14 VITALS — BODY MASS INDEX: 41.6 KG/M2 | WEIGHT: 250 LBS | SYSTOLIC BLOOD PRESSURE: 130 MMHG | DIASTOLIC BLOOD PRESSURE: 68 MMHG

## 2022-12-14 DIAGNOSIS — O24.419 GESTATIONAL DIABETES MELLITUS, CLASS A2: ICD-10-CM

## 2022-12-14 DIAGNOSIS — O26.892 RH NEGATIVE, ANTEPARTUM, SECOND TRIMESTER: ICD-10-CM

## 2022-12-14 DIAGNOSIS — Z67.91 RH NEGATIVE, ANTEPARTUM, SECOND TRIMESTER: ICD-10-CM

## 2022-12-14 DIAGNOSIS — O09.92 ENCOUNTER FOR SUPERVISION OF HIGH RISK PREGNANCY IN SECOND TRIMESTER, ANTEPARTUM: Primary | ICD-10-CM

## 2022-12-14 DIAGNOSIS — O26.879 SHORT CERVIX AFFECTING PREGNANCY: ICD-10-CM

## 2022-12-14 DIAGNOSIS — Z87.59 HISTORY OF GESTATIONAL HYPERTENSION: ICD-10-CM

## 2022-12-14 DIAGNOSIS — O09.899 SHORT INTERVAL BETWEEN PREGNANCIES COMPLICATING PREGNANCY, ANTEPARTUM: ICD-10-CM

## 2022-12-14 DIAGNOSIS — O44.42 LOW-LYING PLACENTA WITHOUT HEMORRHAGE, SECOND TRIMESTER: ICD-10-CM

## 2022-12-14 PROCEDURE — 99214 OFFICE O/P EST MOD 30 MIN: CPT | Performed by: STUDENT IN AN ORGANIZED HEALTH CARE EDUCATION/TRAINING PROGRAM

## 2022-12-14 RX ORDER — GLYBURIDE 2.5 MG/1
2.5 TABLET ORAL 2 TIMES DAILY WITH MEALS
Qty: 60 TABLET | Refills: 2 | Status: SHIPPED | OUTPATIENT
Start: 2022-12-14 | End: 2022-12-27 | Stop reason: SDUPTHER

## 2022-12-14 NOTE — PROGRESS NOTES
Prenatal Care Visit    Subjective   Chief Complaint   Patient presents with   • Routine Prenatal Visit       History:   Melvin is a  currently at 23w3d who presents for a prenatal care visit today.    Denies CTX, VB, LOF. Reports (+) FM. Reports she has started the progesterone as prescribed by MFM. She did have a headache the first night that resolved on its own. Denies vision changes, RUQ pain.    Fasting glucose has been 110-130  Postprandial 130-180     Objective   /68   Wt 113 kg (250 lb)   BMI 41.60 kg/m²   Physical Exam:  Normal, gestational age-appropriate exam today      Assessment & Plan     1. IUP @ 23w3d  2. Routine care: I have reviewed the prenatal labs and ultrasound(s) today. I have reviewed the most recent prenatal progress note(s).   3. Shortened cervix: measured 25mm at PDC US 22. No history of prior PTD. Advised to start vaginal progesterone 200mg QHS, plan f/u scan with MFM in 2 wks.  4. Low lying placenta: posterior, confirmed low lying at PDC appt , will follow up on future ultrasounds.  5. GDM: abnormal 1h and given recent pregnancy with GDM, 3h deferred and patient began monitoring glucose values. Reports she was on glyburide last pregnancy 5mg initially and ultimately on 7.5 mg. Start glyburide 2.5 mg BID. Discussed insulin as first line management, patient prefers to resume glyburide.  6. Rh neg: Rhogam at 28 wks  7. Short interval pregnancy: last  22  8. H/o GHTN: on bASA QD     Diagnosis Plan   1. Encounter for supervision of high risk pregnancy in second trimester, antepartum        2. Short cervix affecting pregnancy        3. Low-lying placenta without hemorrhage, second trimester        4. Gestational diabetes mellitus, class A2  glyburide (DIAbeta) 2.5 MG tablet      5. Rh negative, antepartum, second trimester        6. Short interval between pregnancies complicating pregnancy, antepartum        7. History of gestational hypertension            Medication Management: Continue PNV, bASA, vaginal progesterone    Topics discussed: Prenatal care milestones  Low-lying placenta/ placenta previa precautions  Glucose management  Kick counts and fetal movement  Pre-eclampsia precautions   labor signs and symptoms   Tests next visit: none   Next visit: 2 week(s)     Letty Lux MD  Obstetrics and Gynecology  [facility]

## 2022-12-22 ENCOUNTER — APPOINTMENT (OUTPATIENT)
Dept: WOMENS IMAGING | Facility: HOSPITAL | Age: 32
End: 2022-12-22

## 2022-12-22 DIAGNOSIS — O09.899 SHORT INTERVAL BETWEEN PREGNANCIES COMPLICATING PREGNANCY, ANTEPARTUM: ICD-10-CM

## 2022-12-22 DIAGNOSIS — O44.42 LOW-LYING PLACENTA WITHOUT HEMORRHAGE, SECOND TRIMESTER: ICD-10-CM

## 2022-12-22 DIAGNOSIS — O26.879 SHORT CERVIX AFFECTING PREGNANCY: Primary | ICD-10-CM

## 2022-12-27 ENCOUNTER — ROUTINE PRENATAL (OUTPATIENT)
Dept: OBSTETRICS AND GYNECOLOGY | Facility: CLINIC | Age: 32
End: 2022-12-27

## 2022-12-27 VITALS — WEIGHT: 254 LBS | DIASTOLIC BLOOD PRESSURE: 78 MMHG | BODY MASS INDEX: 42.27 KG/M2 | SYSTOLIC BLOOD PRESSURE: 152 MMHG

## 2022-12-27 DIAGNOSIS — J45.21 MILD INTERMITTENT ASTHMA WITH EXACERBATION: ICD-10-CM

## 2022-12-27 DIAGNOSIS — Z34.92 PRENATAL CARE IN SECOND TRIMESTER: Primary | ICD-10-CM

## 2022-12-27 DIAGNOSIS — O24.419 GESTATIONAL DIABETES MELLITUS, CLASS A2: ICD-10-CM

## 2022-12-27 DIAGNOSIS — O09.899 SHORT INTERVAL BETWEEN PREGNANCIES AFFECTING PREGNANCY, ANTEPARTUM: ICD-10-CM

## 2022-12-27 PROCEDURE — 99214 OFFICE O/P EST MOD 30 MIN: CPT | Performed by: OBSTETRICS & GYNECOLOGY

## 2022-12-27 RX ORDER — GLYBURIDE 2.5 MG/1
TABLET ORAL
Qty: 90 TABLET | Refills: 5 | Status: SHIPPED | OUTPATIENT
Start: 2022-12-27 | End: 2023-02-20 | Stop reason: SDUPTHER

## 2022-12-27 NOTE — PROGRESS NOTES
Prenatal Care Visit    Subjective   Chief Complaint   Patient presents with   • Routine Prenatal Visit     No complaints       History:   Melvin is a  currently at 25w2d who presents for a prenatal care visit today.    FSBG values are better.    Social History    Tobacco Use      Smoking status: Former        Packs/day: 1.50        Years: 15.00        Pack years: 22.5        Types: Cigarettes, Electronic Cigarette        Quit date: 2021        Years since quittin.8      Smokeless tobacco: Never       Objective   /78   Wt 115 kg (254 lb)   BMI 42.27 kg/m²   Physical Exam:  Normal, gestational age-appropriate exam today        Plan   Medical Decision Making:    I have reviewed the prenatal labs and ultrasound(s) today. I have reviewed the most recent prenatal progress note(s).    Diagnosis: Supervision of high risk pregnancy  DM - GDMA2   Short interval pregnancy  Elevated blood pressure  Asthma  Short cervix, on vaginal progesterone  BMI 42   Tests/Orders/Rx today: No orders of the defined types were placed in this encounter.      Medication Management: Increase Glyburide to 2.5 mg in the AM and 5 mg nightly     Topics discussed: Prenatal care milestones  glucose management  PIH precautions   labor signs and symptoms   Increase Glyburide to 2.5 mg in the AM and 5 mg nightly  MFM following  Considering tubal vs vasectomy   Tests next visit: none   Next visit: 2 week(s)     Ramirez Flores MD  Obstetrics and Gynecology  [facility]

## 2022-12-28 ENCOUNTER — OFFICE VISIT (OUTPATIENT)
Dept: OBSTETRICS AND GYNECOLOGY | Facility: HOSPITAL | Age: 32
End: 2022-12-28

## 2022-12-28 ENCOUNTER — HOSPITAL ENCOUNTER (OUTPATIENT)
Dept: WOMENS IMAGING | Facility: HOSPITAL | Age: 32
Discharge: HOME | End: 2022-12-28
Admitting: OBSTETRICS & GYNECOLOGY

## 2022-12-28 VITALS — BODY MASS INDEX: 42.33 KG/M2 | WEIGHT: 254.4 LBS | SYSTOLIC BLOOD PRESSURE: 139 MMHG | DIASTOLIC BLOOD PRESSURE: 68 MMHG

## 2022-12-28 DIAGNOSIS — O26.879 SHORT CERVIX AFFECTING PREGNANCY: Primary | ICD-10-CM

## 2022-12-28 DIAGNOSIS — O09.899 SHORT INTERVAL BETWEEN PREGNANCIES COMPLICATING PREGNANCY, ANTEPARTUM: ICD-10-CM

## 2022-12-28 DIAGNOSIS — O44.42 LOW-LYING PLACENTA WITHOUT HEMORRHAGE, SECOND TRIMESTER: ICD-10-CM

## 2022-12-28 DIAGNOSIS — O26.879 SHORT CERVIX AFFECTING PREGNANCY: ICD-10-CM

## 2022-12-28 DIAGNOSIS — Z34.90 PREGNANCY, UNSPECIFIED GESTATIONAL AGE: ICD-10-CM

## 2022-12-28 DIAGNOSIS — O44.40 LOW-LYING PLACENTA: ICD-10-CM

## 2022-12-28 PROCEDURE — 76815 OB US LIMITED FETUS(S): CPT

## 2022-12-28 PROCEDURE — 76815 OB US LIMITED FETUS(S): CPT | Performed by: OBSTETRICS & GYNECOLOGY

## 2023-01-04 ENCOUNTER — HOSPITAL ENCOUNTER (OUTPATIENT)
Facility: HOSPITAL | Age: 33
Discharge: HOME | End: 2023-01-05
Attending: OBSTETRICS & GYNECOLOGY | Admitting: OBSTETRICS & GYNECOLOGY
Payer: COMMERCIAL

## 2023-01-05 VITALS
TEMPERATURE: 98 F | RESPIRATION RATE: 20 BRPM | HEART RATE: 82 BPM | WEIGHT: 256 LBS | OXYGEN SATURATION: 99 % | DIASTOLIC BLOOD PRESSURE: 55 MMHG | SYSTOLIC BLOOD PRESSURE: 127 MMHG | BODY MASS INDEX: 42.65 KG/M2 | HEIGHT: 65 IN

## 2023-01-05 LAB
BILIRUB BLD-MCNC: NEGATIVE MG/DL
CLARITY, POC: CLEAR
COLOR UR: YELLOW
GLUCOSE UR STRIP-MCNC: NEGATIVE MG/DL
KETONES UR QL: NEGATIVE
LEUKOCYTE EST, POC: ABNORMAL
NITRITE UR-MCNC: NEGATIVE MG/ML
PH UR: 6.5 [PH] (ref 5–8)
PROT UR STRIP-MCNC: NEGATIVE MG/DL
RBC # UR STRIP: ABNORMAL /UL
SP GR UR: 1 (ref 1–1.03)
UROBILINOGEN UR QL: NORMAL

## 2023-01-05 PROCEDURE — 81002 URINALYSIS NONAUTO W/O SCOPE: CPT | Performed by: OBSTETRICS & GYNECOLOGY

## 2023-01-05 PROCEDURE — G0463 HOSPITAL OUTPT CLINIC VISIT: HCPCS

## 2023-01-05 NOTE — NON STRESS TEST
Triage Note - Nursing Documentation  Labor and Delivery Admission Log    Melvin Jarvis  : 1990  MRN: [id number]  CSN: [id number]    Date in / Time in:  2023  Time in:     Date out / Time out:  2023  Time out: 51    Nurse: Katya Fuentes RN    Patient Info: She is a 32 y.o. year old  at 26w4d with an DAVID of 2023, by Ultrasound who was seen on the [facility].    Chief Complaint:   Chief Complaint   Patient presents with   • Abdominal Pain     ABDOMINAL AND BACK PAIN       Provider Instructions / Disposition: Pt states her back pain radiates to both sides of her middle abdomen. No contractions, bleeding, or leaking of fluid. PO hydration and rest. Bran OK to DC and return to office for scheduled fu.     Patient Active Problem List   Diagnosis   • Mild intermittent asthma with exacerbation   • Low-lying placenta   • History of gestational diabetes in prior pregnancy, currently pregnant   • History of gestational hypertension   • Short cervix affecting pregnancy   • Short interval between pregnancies affecting pregnancy, antepartum   • PCOS (polycystic ovarian syndrome)       NST Documentation (Only applicable > 32 weeks):

## 2023-01-09 ENCOUNTER — ROUTINE PRENATAL (OUTPATIENT)
Dept: OBSTETRICS AND GYNECOLOGY | Facility: CLINIC | Age: 33
End: 2023-01-09
Payer: COMMERCIAL

## 2023-01-09 VITALS — BODY MASS INDEX: 42.43 KG/M2 | WEIGHT: 255 LBS | DIASTOLIC BLOOD PRESSURE: 70 MMHG | SYSTOLIC BLOOD PRESSURE: 134 MMHG

## 2023-01-09 DIAGNOSIS — O09.899 SHORT INTERVAL BETWEEN PREGNANCIES AFFECTING PREGNANCY, ANTEPARTUM: ICD-10-CM

## 2023-01-09 DIAGNOSIS — Z87.59 HISTORY OF GESTATIONAL HYPERTENSION: Primary | ICD-10-CM

## 2023-01-09 DIAGNOSIS — O24.419 GDM, CLASS A2: ICD-10-CM

## 2023-01-09 PROBLEM — O44.40 LOW-LYING PLACENTA: Status: RESOLVED | Noted: 2022-11-07 | Resolved: 2023-01-09

## 2023-01-09 PROCEDURE — 99213 OFFICE O/P EST LOW 20 MIN: CPT | Performed by: MIDWIFE

## 2023-01-09 NOTE — PROGRESS NOTES
Chief Complaint   Patient presents with   • Routine Prenatal Visit     No complaints/concerns        HPI: Melvin is a  currently at 27w1d here for prenatal visit who reports the following:  Baby is active. She states she has been taking Glyburide 2.5 mg with each meal and 5 mg at HS. FBS 95-97 and 1 hr pp 120-140. Her last US at [facility], cervix was 2.96 cm and posterior placenta. She doesn't have to go back to them for now. She went to   for R/O ctxs.                EXAM:     Vitals:    23 1007   BP: 134/70      Abdomen:   See prenatal flowsheet as noted and reviewed, soft, nontender   Pelvic:  See prenatal flowsheet as noted and reviewed   Urine:  See prenatal flowsheet as noted and reviewed    Lab Results   Component Value Date    ABO AB 2022    RH Negative 2022    ABSCRN Negative 2022       MDM:  Impression: DM - GDMA2  Rh negative  Closely spaced pregnancies   Tests done today: none   Topics discussed: kick counts and fetal movement   labor signs and symptoms  Advised Glyburide 2.5 q am and 5 mg q hs  Reviewed OB labs   Tests next visit: Rhogam                RTO:                        1 weeks    This note was electronically signed.  Crystal Craven, APRN  2023

## 2023-01-23 ENCOUNTER — ROUTINE PRENATAL (OUTPATIENT)
Dept: OBSTETRICS AND GYNECOLOGY | Facility: CLINIC | Age: 33
End: 2023-01-23
Payer: COMMERCIAL

## 2023-01-23 VITALS — BODY MASS INDEX: 43.27 KG/M2 | SYSTOLIC BLOOD PRESSURE: 138 MMHG | WEIGHT: 260 LBS | DIASTOLIC BLOOD PRESSURE: 78 MMHG

## 2023-01-23 DIAGNOSIS — O09.899 SHORT INTERVAL BETWEEN PREGNANCIES AFFECTING PREGNANCY, ANTEPARTUM: ICD-10-CM

## 2023-01-23 DIAGNOSIS — O26.879 SHORT CERVIX AFFECTING PREGNANCY: ICD-10-CM

## 2023-01-23 DIAGNOSIS — O09.93 HIGH-RISK PREGNANCY IN THIRD TRIMESTER: Primary | ICD-10-CM

## 2023-01-23 DIAGNOSIS — Z87.59 HISTORY OF GESTATIONAL HYPERTENSION: ICD-10-CM

## 2023-01-23 DIAGNOSIS — O26.893 RH NEGATIVE STATUS DURING PREGNANCY IN THIRD TRIMESTER: ICD-10-CM

## 2023-01-23 DIAGNOSIS — Z67.91 RH NEGATIVE STATUS DURING PREGNANCY IN THIRD TRIMESTER: ICD-10-CM

## 2023-01-23 DIAGNOSIS — O24.419 GDM, CLASS A2: ICD-10-CM

## 2023-01-23 PROCEDURE — 96372 THER/PROPH/DIAG INJ SC/IM: CPT | Performed by: STUDENT IN AN ORGANIZED HEALTH CARE EDUCATION/TRAINING PROGRAM

## 2023-01-23 PROCEDURE — 99214 OFFICE O/P EST MOD 30 MIN: CPT | Performed by: STUDENT IN AN ORGANIZED HEALTH CARE EDUCATION/TRAINING PROGRAM

## 2023-01-23 NOTE — PROGRESS NOTES
Prenatal Care Visit    Subjective   Chief Complaint   Patient presents with   • Routine Prenatal Visit     Review blood sugars, headaches     History:   Melvin is a  currently at 29w1d who presents for a prenatal care visit today.    Reports fatigue and headaches. She also reports her glucose values have been elevated. She states her fasting values have been 140-160. Postprandials are mostly 130s-140s. Denies CTX, VB, LOF. Reports (+) FM. Reports at home, blood pressures have been in the 140s/70s.      Objective   /78   Wt 118 kg (260 lb)   BMI 43.27 kg/m²   Physical Exam:  Normal, gestational age-appropriate exam today      Assessment & Plan     1. IUP @ 29w1d  2. Routine care: I have reviewed the prenatal labs and ultrasound(s) today. I have reviewed the most recent prenatal progress note(s).   3. A2GDM: current regimen is glyburide 2.5mg QD, 5mg QHS. Discussed need for improved fasting glucose control. Will add nightly lantus -- start dose at 10u QHS, decrease glyburide to 2.5mg BID. Discussed hypoglycemia precautions. Plan for follow up later this week to review glucose values.  4. H/o GHTN: reports mild range BPs at home and HA. Reviewed pre-e precautions. Baseline pre-e labs ordered. Continue bASA daily.  5. Shortened cervix: CL initially measured 25mm 22, pt started on prometrium 200mg QHS. CL 29.6 mm at last PDC scan, stable.  6. Low lying placenta: resolved  7. Rh (-): Rhogam injection today  8. Short interval pregnancy: last  2022     Diagnosis Plan   1. High-risk pregnancy in third trimester        2. GDM, class A2  Insulin Glargine (LANTUS SOLOSTAR) 100 UNIT/ML injection pen      3. History of gestational hypertension  Comprehensive Metabolic Panel    CBC (No Diff)    Protein / Creatinine Ratio, Urine - Urine, Clean Catch      4. Short cervix affecting pregnancy        5. Rh negative status during pregnancy in third trimester  Rhogam Immune Globulin Immunization      6. Short  interval between pregnancies affecting pregnancy, antepartum           Medication Management: Continue PNV, bASA, prometrium. Start 10u lantus QHS, decrease glyburide to 2.5mg BID. If able to tolerate insulin, can consider transitioning to short acting at mealtimes and discontinuing glyburide.    Topics discussed: Prenatal care milestones  Glucose management  Kick counts and fetal movement  Pre-eclampsia precautions   labor signs and symptoms   Tests next visit: none   Next visit: <1 week(s)     Letty Lux MD  Obstetrics and Gynecology  [facility]

## 2023-01-24 RX ORDER — FLURBIPROFEN SODIUM 0.3 MG/ML
10 SOLUTION/ DROPS OPHTHALMIC NIGHTLY
Qty: 30 EACH | Refills: 6 | Status: SHIPPED | OUTPATIENT
Start: 2023-01-24 | End: 2023-02-13

## 2023-01-30 ENCOUNTER — ROUTINE PRENATAL (OUTPATIENT)
Dept: OBSTETRICS AND GYNECOLOGY | Facility: CLINIC | Age: 33
End: 2023-01-30
Payer: COMMERCIAL

## 2023-01-30 VITALS — SYSTOLIC BLOOD PRESSURE: 120 MMHG | DIASTOLIC BLOOD PRESSURE: 60 MMHG | WEIGHT: 260 LBS | BODY MASS INDEX: 43.27 KG/M2

## 2023-01-30 DIAGNOSIS — O24.419 GDM, CLASS A2: Primary | ICD-10-CM

## 2023-01-30 LAB
ALBUMIN SERPL-MCNC: 3.9 G/DL (ref 3.5–5.2)
ALBUMIN/GLOB SERPL: 1.6 G/DL
ALP SERPL-CCNC: 51 U/L (ref 39–117)
ALT SERPL-CCNC: 11 U/L (ref 1–33)
AST SERPL-CCNC: 11 U/L (ref 1–32)
BILIRUB SERPL-MCNC: 0.2 MG/DL (ref 0–1.2)
BUN SERPL-MCNC: 9 MG/DL (ref 6–20)
BUN/CREAT SERPL: 14.5 (ref 7–25)
CALCIUM SERPL-MCNC: 8.9 MG/DL (ref 8.6–10.5)
CHLORIDE SERPL-SCNC: 104 MMOL/L (ref 98–107)
CO2 SERPL-SCNC: 21.5 MMOL/L (ref 22–29)
CREAT SERPL-MCNC: 0.62 MG/DL (ref 0.57–1)
CREAT UR-MCNC: 127.8 MG/DL
EGFRCR SERPLBLD CKD-EPI 2021: 121.5 ML/MIN/1.73
ERYTHROCYTE [DISTWIDTH] IN BLOOD BY AUTOMATED COUNT: 13.1 % (ref 12.3–15.4)
GLOBULIN SER CALC-MCNC: 2.5 GM/DL
GLUCOSE SERPL-MCNC: 144 MG/DL (ref 65–99)
HCT VFR BLD AUTO: 32 % (ref 34–46.6)
HGB BLD-MCNC: 11 G/DL (ref 12–15.9)
MCH RBC QN AUTO: 30.3 PG (ref 26.6–33)
MCHC RBC AUTO-ENTMCNC: 34.4 G/DL (ref 31.5–35.7)
MCV RBC AUTO: 88.2 FL (ref 79–97)
PLATELET # BLD AUTO: 180 10*3/MM3 (ref 140–450)
POTASSIUM SERPL-SCNC: 3.7 MMOL/L (ref 3.5–5.2)
PROT SERPL-MCNC: 6.4 G/DL (ref 6–8.5)
PROT UR-MCNC: 76.4 MG/DL
PROT/CREAT UR: 597.8 MG/G CREA (ref 0–200)
RBC # BLD AUTO: 3.63 10*6/MM3 (ref 3.77–5.28)
SODIUM SERPL-SCNC: 136 MMOL/L (ref 136–145)
WBC # BLD AUTO: 8.83 10*3/MM3 (ref 3.4–10.8)

## 2023-01-30 PROCEDURE — 99213 OFFICE O/P EST LOW 20 MIN: CPT | Performed by: OBSTETRICS & GYNECOLOGY

## 2023-01-30 RX ORDER — PEN NEEDLE, DIABETIC 32GX 5/32"
NEEDLE, DISPOSABLE MISCELLANEOUS
COMMUNITY
Start: 2023-01-23 | End: 2023-02-13

## 2023-01-30 NOTE — PROGRESS NOTES
Chief Complaint   Patient presents with   • Routine Prenatal Visit     Prenatal visit to discuss insulin.        HPI:   , 30w1d gestation reports doing well    ROS:  See Prenatal Episode/Flowsheet  /60   Wt 118 kg (260 lb)   BMI 43.27 kg/m²      EXAM:  EXTREMITIES:  No swelling-See Prenatal Episode/Flowsheet    ABDOMEN:  FHTs/Movement noted-See Prenatal Episode/Flowsheet    URINE GLUCOSE/PROTEIN:  See Prenatal Episode/Flowsheet    PELVIC EXAM:  See Prenatal Episode/Flowsheet  CV:  Lungs:  GYN:    MDM:    Lab Results   Component Value Date    HGB 11.7 (L) 2022    RUBELLAABIGG 5.48 2022    HEPBSAG Negative 2022    ABO AB 2022    RH Negative 2022    ABSCRN Negative 2022    NAK5EEH6 Non Reactive 2022    HEPCVIRUSABY <0.1 2022    JIL5HTYM 212 (H) 2022    STREPGPB Negative 2022    URINECX No growth 2022       U/S:[facility] (2022 08:49)      1. IUP 30w1d  2. Routine care   3: A2GDM: continue glyburide 2.5mg po qam and 5 mg po qhs   Did not start lantus as FSBS normalized  4. Rhogam given  5. H/O GHTN: labs today

## 2023-02-13 ENCOUNTER — ROUTINE PRENATAL (OUTPATIENT)
Dept: OBSTETRICS AND GYNECOLOGY | Facility: CLINIC | Age: 33
End: 2023-02-13
Payer: COMMERCIAL

## 2023-02-13 VITALS — SYSTOLIC BLOOD PRESSURE: 132 MMHG | WEIGHT: 258 LBS | DIASTOLIC BLOOD PRESSURE: 74 MMHG | BODY MASS INDEX: 42.93 KG/M2

## 2023-02-13 DIAGNOSIS — Z36.89 ENCOUNTER FOR ULTRASOUND TO ASSESS FETAL GROWTH: ICD-10-CM

## 2023-02-13 DIAGNOSIS — Z67.91 RH NEGATIVE STATUS DURING PREGNANCY IN THIRD TRIMESTER: ICD-10-CM

## 2023-02-13 DIAGNOSIS — O26.893 RH NEGATIVE STATUS DURING PREGNANCY IN THIRD TRIMESTER: ICD-10-CM

## 2023-02-13 DIAGNOSIS — O09.93 ENCOUNTER FOR SUPERVISION OF HIGH RISK PREGNANCY IN THIRD TRIMESTER, ANTEPARTUM: Primary | ICD-10-CM

## 2023-02-13 DIAGNOSIS — O24.419 GDM, CLASS A2: ICD-10-CM

## 2023-02-13 DIAGNOSIS — Z87.59 HISTORY OF GESTATIONAL HYPERTENSION: ICD-10-CM

## 2023-02-13 PROCEDURE — 99214 OFFICE O/P EST MOD 30 MIN: CPT | Performed by: OBSTETRICS & GYNECOLOGY

## 2023-02-13 NOTE — PROGRESS NOTES
Chief Complaint  Routine Prenatal Visit (Growth scan today, no complaints. )    History of Present Illness:  Melvin is a  currently at 32w1d who presents today with no significant complaints.  Patient has continued on her glyburide.  She is taking her 2.5 mg usually around 11 AM.  She reports she does not eat breakfast.  She takes her evening dose 5 mg between 930 and 11 PM.  She reports her fasting levels have still been elevated.  She reports they usually are 100-110.  She reports her 1 hour levels are 120-140.  She did receive RhoGAM last visit.  She reports good fetal movement.  She denies any significant cramping or contractions.  She did have labs last visit as noted.  She is inquiring regarding her lab results.    Exam:  Vitals:  See prenatal flowsheet as noted and reviewed  General: Alert, cooperative, and does not appear in any distress  Abdomen:   See prenatal flowsheet as noted and reviewed    Uterus gravid, non-tender; no palpable masses    No guarding or rebound tenderness  Pelvic:  See prenatal flowsheet as noted and reviewed  Ext:  See prenatal flowsheet as noted and reviewed    Moves extremities well, no cyanosis and no redness  Urine:  See prenatal flowsheet as noted and reviewed    Data Review:  The following data was reviewed by: Paige Zaldivar MD on 2023:  Prenatal Labs:  Lab Results   Component Value Date    HGB 11.0 (L) 2023    RUBELLAABIGG 5.48 2022    HEPBSAG Negative 2022    ABO AB 2022    RH Negative 2022    ABSCRN Negative 2022    IFT6ZNP1 Non Reactive 2022    HEPCVIRUSABY <0.1 2022    LOH0DFIN 212 (H) 2022    STREPGPB Negative 2022    URINECX No growth 2022       Routine Prenatal on 2023   Component Date Value   • Glucose 2023 144 (H)    • BUN 2023 9    • Creatinine 2023 0.62    • EGFR Result 2023 121.5    • BUN/Creatinine Ratio 2023 14.5    • Sodium 2023 136    •  Potassium 2023 3.7    • Chloride 2023 104    • Total CO2 2023 21.5 (L)    • Calcium 2023 8.9    • Total Protein 2023 6.4    • Albumin 2023 3.9    • Globulin 2023 2.5    • A/G Ratio 2023 1.6    • Total Bilirubin 2023 0.2    • Alkaline Phosphatase 2023 51    • AST (SGOT) 2023 11    • ALT (SGPT) 2023 11    • WBC 2023 8.83    • RBC 2023 3.63 (L)    • Hemoglobin 2023 11.0 (L)    • Hematocrit 2023 32.0 (L)    • MCV 2023 88.2    • MCH 2023 30.3    • MCHC 2023 34.4    • RDW 2023 13.1    • Platelets 2023 180    • Creatinine, Urine 2023 127.8    • Total Protein, Urine 2023 76.4    • Protein/Creatinine Ratio 2023 597.8 (H)      Imaging:  US Ob Follow Up Transabdominal Approach  Melvin Jarvis  : 1990  MRN: [id number]  Date: 2023    Reason for exam/History:  Growth    Ultrasound images are reviewed.  There is noted to be a viable   intrauterine pregnancy. The pregnancy is measuring 32 weeks 5 days   gestation.  The estimated fetal weight is 2032 grams at the 57.4 % for   growth.  The HC was at the 57.9 % and the AC was at the 70 %.  The   amniotic fluid index was 11.45 cms.  The fetal heart rate was normal.     The infant was in the vertex presentation.  The placental location was   noted to be posterior.      The exam limitations noted:  none    See ultrasound report for measurements and structures identified.    Paige Zaldivar MD, RDMS  [facility]  OB GYN [place]     Medical Records:  None    Assessment and Plan:  Problem List Items Addressed This Visit        Endocrine and Metabolic    GDM, class A2  I have instructed the patient to space out her glyburide every 12 hours.  Instructions and precautions are given.  Patient understands she is to eat her meals and take her glyburide consistently at the same times daily.  Patient will bring back her glucose  diary with her next visit as discussed.  She is to start  testing twice weekly.    Relevant Orders    US Ob Follow Up Transabdominal Approach (Completed)    US Fetal Biophysical Profile;Without Non-Stress Testing       Gravid and     History of gestational hypertension  Patient is to continue her baby aspirin.  PIH precautions and instructions are given.  Patient will need repeat urine testing for protein as discussed.   Other Visit Diagnoses     Encounter for supervision of high risk pregnancy in third trimester, antepartum    -  Primary  Topics discussed:     glucose management  kick counts and fetal movement  PIH precautions   labor signs and symptoms  Patient to start  testing twice weekly as discussed.  Scan today for growth.    Relevant Orders    US Ob Follow Up Transabdominal Approach (Completed)    US Fetal Biophysical Profile;Without Non-Stress Testing    Encounter for ultrasound to assess fetal growth        Relevant Orders    US Ob Follow Up Transabdominal Approach (Completed)    Rh negative status during pregnancy in third trimester      Patient did receive RhoGAM as noted        Follow Up/Instructions:  Follow up as scheduled.  Patient was given instructions and counseling regarding her condition or for health maintenance advice. Please see specific information pulled into the AVS if appropriate.     Note: Speech recognition transcription software may have been used to dictate portions of this document.  An attempt at proofreading has been made though minor errors in transcription may still be present.    This note was electronically signed.  Paige Zaldivar M.D.

## 2023-02-16 ENCOUNTER — HOSPITAL ENCOUNTER (OUTPATIENT)
Facility: HOSPITAL | Age: 33
Discharge: HOME | End: 2023-02-16
Attending: MIDWIFE | Admitting: MIDWIFE
Payer: COMMERCIAL

## 2023-02-16 VITALS
WEIGHT: 263 LBS | TEMPERATURE: 98.6 F | HEART RATE: 89 BPM | DIASTOLIC BLOOD PRESSURE: 51 MMHG | BODY MASS INDEX: 43.77 KG/M2 | SYSTOLIC BLOOD PRESSURE: 123 MMHG | RESPIRATION RATE: 18 BRPM | OXYGEN SATURATION: 100 %

## 2023-02-16 LAB
BILIRUB BLD-MCNC: NEGATIVE MG/DL
CLARITY, POC: CLEAR
COLOR UR: ABNORMAL
GLUCOSE UR STRIP-MCNC: NEGATIVE MG/DL
KETONES UR QL: ABNORMAL
LEUKOCYTE EST, POC: ABNORMAL
NITRITE UR-MCNC: NEGATIVE MG/ML
PH UR: 7.5 [PH] (ref 5–8)
PROT UR STRIP-MCNC: ABNORMAL MG/DL
RBC # UR STRIP: ABNORMAL /UL
SP GR UR: 1.01 (ref 1–1.03)
UROBILINOGEN UR QL: ABNORMAL

## 2023-02-16 PROCEDURE — 59025 FETAL NON-STRESS TEST: CPT | Performed by: MIDWIFE

## 2023-02-16 PROCEDURE — G0463 HOSPITAL OUTPT CLINIC VISIT: HCPCS

## 2023-02-16 PROCEDURE — 59025 FETAL NON-STRESS TEST: CPT

## 2023-02-16 PROCEDURE — 81002 URINALYSIS NONAUTO W/O SCOPE: CPT | Performed by: MIDWIFE

## 2023-02-17 NOTE — NON STRESS TEST
Triage Note - Nursing Documentation  Labor and Delivery Admission Log    Melvin Jarvis  : 1990  MRN: [id number]  CSN: [id number]    Date in / Time in:  2023  Time in:     Date out / Time out:    Time out:     Nurse: Vilma Layton RN    Patient Info: She is a 32 y.o. year old  at 32w4d with an DAVID of 2023, by Ultrasound who was seen on the [facility].    Chief Complaint:   Chief Complaint   Patient presents with   • Non-stress Test     GDM       Provider Instructions / Disposition: RIVKA Jhaveri Notified of reactive NSTand pt okay to be d/c home.      Patient Active Problem List   Diagnosis   • Mild intermittent asthma with exacerbation   • History of gestational diabetes in prior pregnancy, currently pregnant   • History of gestational hypertension   • Short cervix affecting pregnancy   • Short interval between pregnancies affecting pregnancy, antepartum   • PCOS (polycystic ovarian syndrome)   • GDM, class A2       NST Documentation (Only applicable > 32 weeks): Interpretation A  Nonstress Test Interpretation A: Reactive (23 : Vilma Layton, RN)

## 2023-02-17 NOTE — SIGNIFICANT NOTE
02/16/23 1949   Provider Notification   Reason for Communication Status update   Provider Name RIVKA Jhaveri Notified of reactive NSTand pt okay to be d/c home.   Notification Route Phone call   Response See orders

## 2023-02-20 ENCOUNTER — ROUTINE PRENATAL (OUTPATIENT)
Dept: OBSTETRICS AND GYNECOLOGY | Facility: CLINIC | Age: 33
End: 2023-02-20
Payer: COMMERCIAL

## 2023-02-20 VITALS — SYSTOLIC BLOOD PRESSURE: 130 MMHG | BODY MASS INDEX: 43.2 KG/M2 | WEIGHT: 259.6 LBS | DIASTOLIC BLOOD PRESSURE: 60 MMHG

## 2023-02-20 DIAGNOSIS — O24.419 GESTATIONAL DIABETES MELLITUS, CLASS A2: ICD-10-CM

## 2023-02-20 DIAGNOSIS — O24.419 GDM, CLASS A2: Primary | ICD-10-CM

## 2023-02-20 PROCEDURE — 99213 OFFICE O/P EST LOW 20 MIN: CPT | Performed by: OBSTETRICS & GYNECOLOGY

## 2023-02-20 RX ORDER — GLYBURIDE 2.5 MG/1
TABLET ORAL
Qty: 90 TABLET | Refills: 5 | Status: SHIPPED | OUTPATIENT
Start: 2023-02-20 | End: 2023-03-27 | Stop reason: HOSPADM

## 2023-02-20 NOTE — PROGRESS NOTES
Chief Complaint   Patient presents with   • Routine Prenatal Visit     Prenatal visit with BPP done today. No problems or concerns        HPI:   , 33w1d gestation reports doing well    ROS:  See Prenatal Episode/Flowsheet  /60   Wt 118 kg (259 lb 9.6 oz)   BMI 43.20 kg/m²      EXAM:  EXTREMITIES:  No swelling-See Prenatal Episode/Flowsheet    ABDOMEN:  FHTs/Movement noted-See Prenatal Episode/Flowsheet    URINE GLUCOSE/PROTEIN:  See Prenatal Episode/Flowsheet    PELVIC EXAM:  See Prenatal Episode/Flowsheet  CV:  Lungs:  GYN:    MDM:    Lab Results   Component Value Date    HGB 11.0 (L) 2023    RUBELLAABIGG 5.48 2022    HEPBSAG Negative 2022    ABO AB 2022    RH Negative 2022    ABSCRN Negative 2022    AOW3OGE3 Non Reactive 2022    HEPCVIRUSABY <0.1 2022    AAH4YTIA 212 (H) 2022    STREPGPB Negative 2022    URINECX No growth 2022       U/S:BPP 8/8, TIMMY 14.47, Vertex    1. IUP 33w1d  2. Routine care   3. A2 GDM: Fasting low 100's, one hour's 120-130   Glyburide 2.5mg po qam and 5 mg po qhs    Increase glyubride to 7.5mg po qhs with 2/5mg po qam   NST padma this week

## 2023-02-24 ENCOUNTER — HOSPITAL ENCOUNTER (OUTPATIENT)
Facility: HOSPITAL | Age: 33
Discharge: HOME | End: 2023-02-24
Attending: MIDWIFE | Admitting: MIDWIFE
Payer: COMMERCIAL

## 2023-02-24 ENCOUNTER — HOSPITAL ENCOUNTER (OUTPATIENT)
Dept: LABOR AND DELIVERY | Facility: HOSPITAL | Age: 33
Discharge: HOME | End: 2023-02-24
Payer: COMMERCIAL

## 2023-02-24 VITALS
TEMPERATURE: 97.9 F | HEIGHT: 65 IN | BODY MASS INDEX: 43.2 KG/M2 | OXYGEN SATURATION: 100 % | DIASTOLIC BLOOD PRESSURE: 63 MMHG | HEART RATE: 81 BPM | SYSTOLIC BLOOD PRESSURE: 137 MMHG | RESPIRATION RATE: 16 BRPM

## 2023-02-24 LAB
AMORPH URATE CRY URNS QL MICRO: ABNORMAL /HPF
BACTERIA UR QL AUTO: ABNORMAL /HPF
BILIRUB BLD-MCNC: NEGATIVE MG/DL
BILIRUB UR QL STRIP: NEGATIVE
CLARITY UR: ABNORMAL
CLARITY, POC: CLEAR
COLOR UR: ABNORMAL
COLOR UR: YELLOW
GLUCOSE UR STRIP-MCNC: NEGATIVE MG/DL
GLUCOSE UR STRIP-MCNC: NEGATIVE MG/DL
HGB UR QL STRIP.AUTO: ABNORMAL
HYALINE CASTS UR QL AUTO: ABNORMAL /LPF
KETONES UR QL STRIP: ABNORMAL
KETONES UR QL: ABNORMAL
LEUKOCYTE EST, POC: ABNORMAL
LEUKOCYTE ESTERASE UR QL STRIP.AUTO: ABNORMAL
NITRITE UR QL STRIP: NEGATIVE
NITRITE UR-MCNC: NEGATIVE MG/ML
PH UR STRIP.AUTO: 7.5 [PH] (ref 5–8)
PH UR: 7.5 [PH] (ref 5–8)
PROT UR QL STRIP: ABNORMAL
PROT UR STRIP-MCNC: ABNORMAL MG/DL
RBC # UR STRIP: ABNORMAL /HPF
RBC # UR STRIP: ABNORMAL /UL
REF LAB TEST METHOD: ABNORMAL
SP GR UR STRIP: 1.02 (ref 1–1.03)
SP GR UR: 1.01 (ref 1–1.03)
SQUAMOUS #/AREA URNS HPF: ABNORMAL /HPF
UROBILINOGEN UR QL STRIP: ABNORMAL
UROBILINOGEN UR QL: ABNORMAL
WBC # UR STRIP: ABNORMAL /HPF

## 2023-02-24 PROCEDURE — G0463 HOSPITAL OUTPT CLINIC VISIT: HCPCS

## 2023-02-24 PROCEDURE — 87086 URINE CULTURE/COLONY COUNT: CPT | Performed by: MIDWIFE

## 2023-02-24 PROCEDURE — 81002 URINALYSIS NONAUTO W/O SCOPE: CPT | Performed by: MIDWIFE

## 2023-02-24 PROCEDURE — 59025 FETAL NON-STRESS TEST: CPT | Performed by: MIDWIFE

## 2023-02-24 PROCEDURE — 59025 FETAL NON-STRESS TEST: CPT

## 2023-02-24 PROCEDURE — 81001 URINALYSIS AUTO W/SCOPE: CPT | Performed by: MIDWIFE

## 2023-02-26 LAB — BACTERIA SPEC AEROBE CULT: NORMAL

## 2023-02-27 ENCOUNTER — ROUTINE PRENATAL (OUTPATIENT)
Dept: OBSTETRICS AND GYNECOLOGY | Facility: CLINIC | Age: 33
End: 2023-02-27
Payer: COMMERCIAL

## 2023-02-27 VITALS — WEIGHT: 263 LBS | SYSTOLIC BLOOD PRESSURE: 118 MMHG | DIASTOLIC BLOOD PRESSURE: 62 MMHG | BODY MASS INDEX: 43.77 KG/M2

## 2023-02-27 DIAGNOSIS — O24.419 GDM, CLASS A2: Primary | ICD-10-CM

## 2023-02-27 DIAGNOSIS — O09.899 SHORT INTERVAL BETWEEN PREGNANCIES AFFECTING PREGNANCY, ANTEPARTUM: ICD-10-CM

## 2023-02-27 PROCEDURE — 99213 OFFICE O/P EST LOW 20 MIN: CPT | Performed by: MIDWIFE

## 2023-02-27 RX ORDER — NITROFURANTOIN 25; 75 MG/1; MG/1
100 CAPSULE ORAL 2 TIMES DAILY
Qty: 14 CAPSULE | Refills: 0 | Status: SHIPPED | OUTPATIENT
Start: 2023-02-27 | End: 2023-03-06

## 2023-02-27 NOTE — PROGRESS NOTES
Chief Complaint   Patient presents with   • Routine Prenatal Visit     No Complaints/concerns        HPI: Melvin is a  currently at 34w1d here for prenatal visit who reports the following:  Baby is active. She is taking Glyburide 2.5 q am and 7.5 q HS. Her HS dose was increased last week. She takes these at 11 am and 11 pm. Her FBS at 9 am still elevated and she reports 107-113. 1 hr pp 120-140.  She is having some urinary urgency and frequency.                EXAM:     Vitals:    23 1056   BP: 118/62      Abdomen:   See prenatal flowsheet as noted and reviewed, soft, nontender   Pelvic:  See prenatal flowsheet as noted and reviewed   Urine:  See prenatal flowsheet as noted and reviewed    Lab Results   Component Value Date    ABO AB 2022    RH Negative 2022    ABSCRN Negative 2022       MDM:  Impression: DM - GDMA2  Rh negative  Anemia in pregnancy   Tests done today: BPP -    Topics discussed: kick counts and fetal movement   labor signs and symptoms  Consulted with Dr Zaldivar. Continue meds as prescribed. Will discuss use of Insulin if still elevated when she comes in for NST  Macrobid BID x 7 days  Reviewed OB labs   Tests next visit: BPP                RTO:                        1 weeks    This note was electronically signed.  Crystal Craven, RIVKA  2023

## 2023-03-02 ENCOUNTER — HOSPITAL ENCOUNTER (OUTPATIENT)
Facility: HOSPITAL | Age: 33
Discharge: HOME | End: 2023-03-02
Attending: MIDWIFE | Admitting: MIDWIFE
Payer: COMMERCIAL

## 2023-03-02 ENCOUNTER — HOSPITAL ENCOUNTER (OUTPATIENT)
Dept: LABOR AND DELIVERY | Facility: HOSPITAL | Age: 33
Discharge: HOME | End: 2023-03-02
Payer: COMMERCIAL

## 2023-03-02 VITALS
RESPIRATION RATE: 16 BRPM | HEART RATE: 80 BPM | HEIGHT: 65 IN | TEMPERATURE: 98 F | WEIGHT: 263 LBS | DIASTOLIC BLOOD PRESSURE: 64 MMHG | SYSTOLIC BLOOD PRESSURE: 127 MMHG | OXYGEN SATURATION: 97 % | BODY MASS INDEX: 43.82 KG/M2

## 2023-03-02 LAB
BILIRUB BLD-MCNC: NEGATIVE MG/DL
CLARITY, POC: ABNORMAL
COLOR UR: YELLOW
GLUCOSE UR STRIP-MCNC: NEGATIVE MG/DL
KETONES UR QL: NEGATIVE
LEUKOCYTE EST, POC: NEGATIVE
NITRITE UR-MCNC: NEGATIVE MG/ML
PH UR: 7.5 [PH] (ref 5–8)
PROT UR STRIP-MCNC: ABNORMAL MG/DL
RBC # UR STRIP: ABNORMAL /UL
SP GR UR: 1.01 (ref 1–1.03)
UROBILINOGEN UR QL: NORMAL

## 2023-03-02 PROCEDURE — G0463 HOSPITAL OUTPT CLINIC VISIT: HCPCS

## 2023-03-02 PROCEDURE — 59025 FETAL NON-STRESS TEST: CPT

## 2023-03-02 PROCEDURE — 59025 FETAL NON-STRESS TEST: CPT | Performed by: MIDWIFE

## 2023-03-02 PROCEDURE — 81002 URINALYSIS NONAUTO W/O SCOPE: CPT | Performed by: MIDWIFE

## 2023-03-02 NOTE — NON STRESS TEST
Triage Note - Nursing Documentation  Labor and Delivery Admission Log    Melvin Jarvis  : 1990  MRN: [id number]  CSN: [id number]    Date in / Time in:  3/2/2023  Time in: 1321    Date out / Time out:    Time out: 1400    Nurse: Liz Kumar, RN    Patient Info: She is a 32 y.o. year old  at 34w4d with an DAVID of 2023, by Ultrasound who was seen on the [facility].    Chief Complaint:   Chief Complaint   Patient presents with   • Non-stress Test     GDM        Provider Instructions / Disposition: Reactive NST, APRN discussed insulin useable, discharged home     Patient Active Problem List   Diagnosis   • Mild intermittent asthma with exacerbation   • History of gestational diabetes in prior pregnancy, currently pregnant   • History of gestational hypertension   • Short cervix affecting pregnancy   • Short interval between pregnancies affecting pregnancy, antepartum   • PCOS (polycystic ovarian syndrome)   • GDM, class A2       NST Documentation (Only applicable > 32 weeks): Interpretation A  Nonstress Test Interpretation A: Reactive (23 1355 : Liz Kumar, RN)

## 2023-03-06 ENCOUNTER — ROUTINE PRENATAL (OUTPATIENT)
Dept: OBSTETRICS AND GYNECOLOGY | Facility: CLINIC | Age: 33
End: 2023-03-06
Payer: COMMERCIAL

## 2023-03-06 VITALS — BODY MASS INDEX: 43.6 KG/M2 | SYSTOLIC BLOOD PRESSURE: 122 MMHG | WEIGHT: 262 LBS | DIASTOLIC BLOOD PRESSURE: 64 MMHG

## 2023-03-06 DIAGNOSIS — O09.899 SHORT INTERVAL BETWEEN PREGNANCIES AFFECTING PREGNANCY, ANTEPARTUM: ICD-10-CM

## 2023-03-06 DIAGNOSIS — O24.419 GESTATIONAL DIABETES MELLITUS, CLASS A2: Primary | ICD-10-CM

## 2023-03-06 DIAGNOSIS — O24.419 GDM, CLASS A2: ICD-10-CM

## 2023-03-06 PROCEDURE — 99214 OFFICE O/P EST MOD 30 MIN: CPT | Performed by: MIDWIFE

## 2023-03-06 NOTE — PROGRESS NOTES
Chief Complaint   Patient presents with   • Routine Prenatal Visit     Elevated fasting sugars        HPI: Melvin is a  currently at 35w1d here for prenatal visit who reports the following:  Baby is active. She started Insulin 10 units q hs 3 days ago. , 113, and 133. She also takes Glyburide 5 mg with breakfast. 1 hr pp have been < 140 except 1 time.                EXAM:     Vitals:    23 1138   BP: 122/64      Abdomen:   See prenatal flowsheet as noted and reviewed, soft, nontender   Pelvic:  See prenatal flowsheet as noted and reviewed   Urine:  See prenatal flowsheet as noted and reviewed    Lab Results   Component Value Date    ABO AB 2022    RH Negative 2022    ABSCRN Negative 2022       MDM:  Impression: DM - GDMA2   Tests done today: BPP -    Topics discussed: glucose management- Consulted with Dr Flores, increase Insulin to 20 units q hs  kick counts and fetal movement  labor signs and symptoms  NST 3/9  Reviewed OB labs   Tests next visit: BPP                RTO:                        1 weeks    This note was electronically signed.  Crystal Craven, RIVKA  3/6/2023

## 2023-03-07 ENCOUNTER — TELEPHONE (OUTPATIENT)
Dept: OBSTETRICS AND GYNECOLOGY | Facility: CLINIC | Age: 33
End: 2023-03-07
Payer: COMMERCIAL

## 2023-03-07 NOTE — TELEPHONE ENCOUNTER
----- Message from Yee Rodgers sent at 3/7/2023  8:20 AM EST -----  Pt's insulin was increased from 10 units to 20 yesterday per Dr Flores.  She said she tested when she woke up her insulin level was 153.    Pt requested a callback.

## 2023-03-09 ENCOUNTER — HOSPITAL ENCOUNTER (OUTPATIENT)
Dept: LABOR AND DELIVERY | Facility: HOSPITAL | Age: 33
Discharge: HOME | End: 2023-03-09
Payer: COMMERCIAL

## 2023-03-09 ENCOUNTER — HOSPITAL ENCOUNTER (OUTPATIENT)
Facility: HOSPITAL | Age: 33
Discharge: HOME | End: 2023-03-09
Attending: MIDWIFE | Admitting: MIDWIFE
Payer: COMMERCIAL

## 2023-03-09 VITALS
SYSTOLIC BLOOD PRESSURE: 133 MMHG | HEIGHT: 65 IN | DIASTOLIC BLOOD PRESSURE: 64 MMHG | RESPIRATION RATE: 16 BRPM | OXYGEN SATURATION: 100 % | BODY MASS INDEX: 43.82 KG/M2 | WEIGHT: 263 LBS | HEART RATE: 84 BPM | TEMPERATURE: 97.4 F

## 2023-03-09 LAB
BILIRUB BLD-MCNC: NEGATIVE MG/DL
CLARITY, POC: CLEAR
COLOR UR: ABNORMAL
GLUCOSE UR STRIP-MCNC: NEGATIVE MG/DL
KETONES UR QL: NEGATIVE
LEUKOCYTE EST, POC: NEGATIVE
NITRITE UR-MCNC: NEGATIVE MG/ML
PH UR: 7 [PH] (ref 5–8)
PROT UR STRIP-MCNC: NEGATIVE MG/DL
RBC # UR STRIP: ABNORMAL /UL
SP GR UR: 1.01 (ref 1–1.03)
UROBILINOGEN UR QL: NORMAL

## 2023-03-09 PROCEDURE — 81002 URINALYSIS NONAUTO W/O SCOPE: CPT | Performed by: MIDWIFE

## 2023-03-09 PROCEDURE — 59025 FETAL NON-STRESS TEST: CPT

## 2023-03-09 PROCEDURE — G0463 HOSPITAL OUTPT CLINIC VISIT: HCPCS

## 2023-03-09 PROCEDURE — 59025 FETAL NON-STRESS TEST: CPT | Performed by: MIDWIFE

## 2023-03-09 NOTE — NON STRESS TEST
Triage Note - Nursing Documentation  Labor and Delivery Admission Log    Melvin Jarvis  : 1990  MRN: [id number]  CSN: [id number]    Date in / Time in:  3/9/2023  Time in: 1314    Date out / Time out:    Time out: 1356    Nurse: Lian Gonzalez RN    Patient Info: She is a 32 y.o. year old  at 35w4d with an DAVID of 2023, by Ultrasound who was seen on the [facility].    Chief Complaint:   Chief Complaint   Patient presents with   • Non-stress Test     Gestational Diabetes       Provider Instructions / Disposition: 1356    Patient educated on  labor, fetal movement and increase dose of Glargine to 28 units per Dr. Letty Lux.  Patient verbalized understanding to instructions and signed.  Patient has follow-up appointment in OB/GYN office on 3/13/23.  Patient instructed if any change in condition; patient to return [facility] for assessment/evaluation.  Patient discharged home in stable condition. - Lian Gonzalez RN.    Patient Active Problem List   Diagnosis   • Mild intermittent asthma with exacerbation   • History of gestational diabetes in prior pregnancy, currently pregnant   • History of gestational hypertension   • Short cervix affecting pregnancy   • Short interval between pregnancies affecting pregnancy, antepartum   • PCOS (polycystic ovarian syndrome)   • GDM, class A2       NST Documentation (Only applicable > 32 weeks): Interpretation A  Nonstress Test Interpretation A: Reactive (23 1356 : Lian Gonzalez, RN)

## 2023-03-13 ENCOUNTER — ROUTINE PRENATAL (OUTPATIENT)
Dept: OBSTETRICS AND GYNECOLOGY | Facility: CLINIC | Age: 33
End: 2023-03-13
Payer: COMMERCIAL

## 2023-03-13 VITALS — SYSTOLIC BLOOD PRESSURE: 112 MMHG | BODY MASS INDEX: 43.93 KG/M2 | DIASTOLIC BLOOD PRESSURE: 72 MMHG | WEIGHT: 264 LBS

## 2023-03-13 DIAGNOSIS — Z36.85 ANTENATAL SCREENING FOR STREPTOCOCCUS B: ICD-10-CM

## 2023-03-13 DIAGNOSIS — Z34.93 PRENATAL CARE IN THIRD TRIMESTER: Primary | ICD-10-CM

## 2023-03-13 DIAGNOSIS — O24.419 GDM, CLASS A2: ICD-10-CM

## 2023-03-13 PROCEDURE — 99214 OFFICE O/P EST MOD 30 MIN: CPT | Performed by: OBSTETRICS & GYNECOLOGY

## 2023-03-13 NOTE — PROGRESS NOTES
Prenatal Care Visit    Subjective   Chief Complaint   Patient presents with   • Routine Prenatal Visit     GBS, no complaints       History:   Melvin is a  currently at 36w1d who presents for a prenatal care visit today.    FSBG values are better, but fasting values are still high.    Social History    Tobacco Use      Smoking status: Former        Packs/day: 1.50        Years: 15.00        Pack years: 22.5        Types: Cigarettes, Electronic Cigarette        Quit date: 2021        Years since quittin.0      Smokeless tobacco: Never       Objective   /72   Wt 120 kg (264 lb)   BMI 43.93 kg/m²   Physical Exam:  Normal, gestational age-appropriate exam today        Plan   Medical Decision Making:    I have reviewed the prenatal labs and ultrasound(s) today. I have reviewed the most recent prenatal progress note(s).    Diagnosis: Supervision of high risk pregnancy  DM - GDMA2   Short interval pregnancy  Asthma  Short cervix, on vaginal progesterone  BMI 44   Tests/Orders/Rx today: Orders Placed This Encounter   Procedures   • Strep Grp B MK + Reflex - Swab, Vaginal/Rectum     Order Specific Question:   Release to patient     Answer:   Routine Release       Medication Management: Increase Lantus to 32 units nightly     Topics discussed: Prenatal care milestones  glucose management  induction of labor  PIH precautions   labor signs and symptoms   Increase Lantus as above, multiple insulin adjustments required lately. Likely IOL at 37-38 weeks.  Considering tubal   Tests next visit: BPP  NST   Next visit: 1 week(s)     Ramirez Flores MD  Obstetrics and Gynecology  [facility]

## 2023-03-17 LAB
CLINDAMYCIN ISLT KB: ABNORMAL
GP B STREP DNA SPEC QL NAA+PROBE: POSITIVE
ORGANISM ID: ABNORMAL

## 2023-03-20 ENCOUNTER — PREP FOR SURGERY (OUTPATIENT)
Dept: OTHER | Facility: HOSPITAL | Age: 33
End: 2023-03-20
Payer: COMMERCIAL

## 2023-03-20 ENCOUNTER — ROUTINE PRENATAL (OUTPATIENT)
Dept: OBSTETRICS AND GYNECOLOGY | Facility: CLINIC | Age: 33
End: 2023-03-20
Payer: COMMERCIAL

## 2023-03-20 VITALS — WEIGHT: 263 LBS | BODY MASS INDEX: 43.77 KG/M2 | SYSTOLIC BLOOD PRESSURE: 122 MMHG | DIASTOLIC BLOOD PRESSURE: 64 MMHG

## 2023-03-20 DIAGNOSIS — O24.419 GDM, CLASS A2: Primary | ICD-10-CM

## 2023-03-20 DIAGNOSIS — O09.899 SHORT INTERVAL BETWEEN PREGNANCIES AFFECTING PREGNANCY, ANTEPARTUM: ICD-10-CM

## 2023-03-20 DIAGNOSIS — Z87.59 HISTORY OF GESTATIONAL HYPERTENSION: ICD-10-CM

## 2023-03-20 DIAGNOSIS — Z34.90 ENCOUNTER FOR INDUCTION OF LABOR: Primary | ICD-10-CM

## 2023-03-20 PROCEDURE — 99213 OFFICE O/P EST LOW 20 MIN: CPT | Performed by: MIDWIFE

## 2023-03-20 RX ORDER — CARBOPROST TROMETHAMINE 250 UG/ML
250 INJECTION, SOLUTION INTRAMUSCULAR ONCE AS NEEDED
Status: CANCELLED | OUTPATIENT
Start: 2023-03-20 | End: 2023-03-21

## 2023-03-20 RX ORDER — HYDROXYZINE HYDROCHLORIDE 25 MG/1
50 TABLET, FILM COATED ORAL NIGHTLY PRN
Status: CANCELLED | OUTPATIENT
Start: 2023-03-20

## 2023-03-20 RX ORDER — SODIUM CHLORIDE, SODIUM LACTATE, POTASSIUM CHLORIDE, CALCIUM CHLORIDE 600; 310; 30; 20 MG/100ML; MG/100ML; MG/100ML; MG/100ML
125 INJECTION, SOLUTION INTRAVENOUS CONTINUOUS
Status: CANCELLED | OUTPATIENT
Start: 2023-03-20

## 2023-03-20 RX ORDER — SODIUM CHLORIDE 0.9 % (FLUSH) 0.9 %
10 SYRINGE (ML) INJECTION EVERY 12 HOURS SCHEDULED
Status: CANCELLED | OUTPATIENT
Start: 2023-03-20

## 2023-03-20 RX ORDER — OXYTOCIN/0.9 % SODIUM CHLORIDE 30/500 ML
1-20 PLASTIC BAG, INJECTION (ML) INTRAVENOUS
Status: CANCELLED | OUTPATIENT
Start: 2023-03-20

## 2023-03-20 RX ORDER — MAGNESIUM CARB/ALUMINUM HYDROX 105-160MG
30 TABLET,CHEWABLE ORAL ONCE
Status: CANCELLED | OUTPATIENT
Start: 2023-03-20 | End: 2023-03-20

## 2023-03-20 RX ORDER — HYDROCODONE BITARTRATE AND ACETAMINOPHEN 5; 325 MG/1; MG/1
1 TABLET ORAL EVERY 4 HOURS PRN
Status: CANCELLED | OUTPATIENT
Start: 2023-03-20 | End: 2023-03-27

## 2023-03-20 RX ORDER — OXYTOCIN/0.9 % SODIUM CHLORIDE 30/500 ML
250 PLASTIC BAG, INJECTION (ML) INTRAVENOUS CONTINUOUS
Status: CANCELLED | OUTPATIENT
Start: 2023-03-20 | End: 2023-03-20

## 2023-03-20 RX ORDER — LIDOCAINE HYDROCHLORIDE 10 MG/ML
5 INJECTION, SOLUTION EPIDURAL; INFILTRATION; INTRACAUDAL; PERINEURAL AS NEEDED
Status: CANCELLED | OUTPATIENT
Start: 2023-03-20

## 2023-03-20 RX ORDER — ONDANSETRON 4 MG/1
4 TABLET, FILM COATED ORAL EVERY 6 HOURS PRN
Status: CANCELLED | OUTPATIENT
Start: 2023-03-20

## 2023-03-20 RX ORDER — SODIUM CHLORIDE 0.9 % (FLUSH) 0.9 %
1-10 SYRINGE (ML) INJECTION AS NEEDED
Status: CANCELLED | OUTPATIENT
Start: 2023-03-20

## 2023-03-20 RX ORDER — METHYLERGONOVINE MALEATE 0.2 MG/ML
200 INJECTION INTRAVENOUS ONCE AS NEEDED
Status: CANCELLED | OUTPATIENT
Start: 2023-03-20 | End: 2023-03-21

## 2023-03-20 RX ORDER — PROMETHAZINE HYDROCHLORIDE 12.5 MG/1
12.5 SUPPOSITORY RECTAL EVERY 6 HOURS PRN
Status: CANCELLED | OUTPATIENT
Start: 2023-03-20

## 2023-03-20 RX ORDER — ACETAMINOPHEN 325 MG/1
650 TABLET ORAL ONCE AS NEEDED
Status: CANCELLED | OUTPATIENT
Start: 2023-03-20

## 2023-03-20 RX ORDER — ONDANSETRON 4 MG/1
4 TABLET, FILM COATED ORAL ONCE AS NEEDED
Status: CANCELLED | OUTPATIENT
Start: 2023-03-20

## 2023-03-20 RX ORDER — PROMETHAZINE HYDROCHLORIDE 12.5 MG/1
12.5 TABLET ORAL EVERY 6 HOURS PRN
Status: CANCELLED | OUTPATIENT
Start: 2023-03-20

## 2023-03-20 RX ORDER — MISOPROSTOL 200 UG/1
800 TABLET ORAL ONCE AS NEEDED
Status: CANCELLED | OUTPATIENT
Start: 2023-03-20 | End: 2023-03-21

## 2023-03-20 RX ORDER — ONDANSETRON 2 MG/ML
4 INJECTION INTRAMUSCULAR; INTRAVENOUS EVERY 6 HOURS PRN
Status: CANCELLED | OUTPATIENT
Start: 2023-03-20

## 2023-03-20 RX ORDER — MORPHINE SULFATE 2 MG/ML
2 INJECTION, SOLUTION INTRAMUSCULAR; INTRAVENOUS ONCE AS NEEDED
Status: CANCELLED | OUTPATIENT
Start: 2023-03-20

## 2023-03-20 RX ORDER — ONDANSETRON 2 MG/ML
4 INJECTION INTRAMUSCULAR; INTRAVENOUS ONCE AS NEEDED
Status: CANCELLED | OUTPATIENT
Start: 2023-03-20

## 2023-03-20 RX ORDER — OXYTOCIN/0.9 % SODIUM CHLORIDE 30/500 ML
999 PLASTIC BAG, INJECTION (ML) INTRAVENOUS ONCE
Status: CANCELLED | OUTPATIENT
Start: 2023-03-20 | End: 2023-03-20

## 2023-03-20 NOTE — PROGRESS NOTES
Chief Complaint   Patient presents with   • Routine Prenatal Visit     No Complaints/concerns        HPI: Melvin is a  currently at 37w1d here for prenatal visit who reports the following:  Baby is active. -120. She is taking 32 units Insulin q HS and glyburide 2.5 q am.                EXAM:     Vitals:    23 1421   BP: 122/64      Abdomen:   See prenatal flowsheet as noted and reviewed, soft, nontender   Pelvic:  See prenatal flowsheet as noted and reviewed 2-3/50/- posterior   Urine:  See prenatal flowsheet as noted and reviewed    Lab Results   Component Value Date    ABO AB 2022    RH Negative 2022    ABSCRN Negative 2022       MDM:  Impression: DM - GDMA2-she has had to have dosage increased several times recently  GBS (+)  Rh negative  Anemia in pregnancy   Tests done today: none   Topics discussed: glucose management  induction of labor  kick counts and fetal movement  Reviewed OB labs   Tests next visit: none                RTO:                      Pitocin induction 3/23 @ 9 pm    This note was electronically signed.  RIVKA Jhaveri  3/20/2023

## 2023-03-23 ENCOUNTER — HOSPITAL ENCOUNTER (OUTPATIENT)
Dept: LABOR AND DELIVERY | Facility: HOSPITAL | Age: 33
Discharge: HOME | End: 2023-03-23
Payer: COMMERCIAL

## 2023-03-23 ENCOUNTER — HOSPITAL ENCOUNTER (INPATIENT)
Facility: HOSPITAL | Age: 33
LOS: 4 days | Discharge: HOME | End: 2023-03-27
Attending: MIDWIFE | Admitting: MIDWIFE
Payer: COMMERCIAL

## 2023-03-23 DIAGNOSIS — Z34.90 ENCOUNTER FOR INDUCTION OF LABOR: ICD-10-CM

## 2023-03-23 LAB
ABO GROUP BLD: NORMAL
ALBUMIN SERPL-MCNC: 3.5 G/DL (ref 3.5–5.2)
ALBUMIN/GLOB SERPL: 1.1 G/DL
ALP SERPL-CCNC: 89 U/L (ref 39–117)
ALT SERPL W P-5'-P-CCNC: 17 U/L (ref 1–33)
ANION GAP SERPL CALCULATED.3IONS-SCNC: 12.7 MMOL/L (ref 5–15)
ANTI-D, PASSIVE: NORMAL
AST SERPL-CCNC: 23 U/L (ref 1–32)
BASOPHILS # BLD AUTO: 0.02 10*3/MM3 (ref 0–0.2)
BASOPHILS NFR BLD AUTO: 0.2 % (ref 0–1.5)
BILIRUB BLD-MCNC: NEGATIVE MG/DL
BILIRUB SERPL-MCNC: 0.3 MG/DL (ref 0–1.2)
BLD GP AB SCN SERPL QL: POSITIVE
BUN SERPL-MCNC: 11 MG/DL (ref 6–20)
BUN/CREAT SERPL: 16.9 (ref 7–25)
CALCIUM SPEC-SCNC: 8.5 MG/DL (ref 8.6–10.5)
CHLORIDE SERPL-SCNC: 106 MMOL/L (ref 98–107)
CLARITY, POC: CLEAR
CO2 SERPL-SCNC: 19.3 MMOL/L (ref 22–29)
COLOR UR: YELLOW
CREAT SERPL-MCNC: 0.65 MG/DL (ref 0.57–1)
DEPRECATED RDW RBC AUTO: 46.5 FL (ref 37–54)
EGFRCR SERPLBLD CKD-EPI 2021: 120.1 ML/MIN/1.73
EOSINOPHIL # BLD AUTO: 0.05 10*3/MM3 (ref 0–0.4)
EOSINOPHIL NFR BLD AUTO: 0.4 % (ref 0.3–6.2)
ERYTHROCYTE [DISTWIDTH] IN BLOOD BY AUTOMATED COUNT: 14.3 % (ref 12.3–15.4)
GLOBULIN UR ELPH-MCNC: 3.3 GM/DL
GLUCOSE SERPL-MCNC: 131 MG/DL (ref 65–99)
GLUCOSE UR STRIP-MCNC: NEGATIVE MG/DL
HCT VFR BLD AUTO: 34.5 % (ref 34–46.6)
HGB BLD-MCNC: 11.5 G/DL (ref 12–15.9)
IMM GRANULOCYTES # BLD AUTO: 0.04 10*3/MM3 (ref 0–0.05)
IMM GRANULOCYTES NFR BLD AUTO: 0.4 % (ref 0–0.5)
KETONES UR QL: NEGATIVE
LEUKOCYTE EST, POC: NEGATIVE
LYMPHOCYTES # BLD AUTO: 1.95 10*3/MM3 (ref 0.7–3.1)
LYMPHOCYTES NFR BLD AUTO: 17.4 % (ref 19.6–45.3)
MCH RBC QN AUTO: 30.1 PG (ref 26.6–33)
MCHC RBC AUTO-ENTMCNC: 33.3 G/DL (ref 31.5–35.7)
MCV RBC AUTO: 90.3 FL (ref 79–97)
MONOCYTES # BLD AUTO: 0.62 10*3/MM3 (ref 0.1–0.9)
MONOCYTES NFR BLD AUTO: 5.5 % (ref 5–12)
NEUTROPHILS NFR BLD AUTO: 76.1 % (ref 42.7–76)
NEUTROPHILS NFR BLD AUTO: 8.52 10*3/MM3 (ref 1.7–7)
NITRITE UR-MCNC: NEGATIVE MG/ML
NRBC BLD AUTO-RTO: 0 /100 WBC (ref 0–0.2)
PH UR: 7.5 [PH] (ref 5–8)
PLATELET # BLD AUTO: 170 10*3/MM3 (ref 140–450)
PMV BLD AUTO: 10.8 FL (ref 6–12)
POTASSIUM SERPL-SCNC: 3.7 MMOL/L (ref 3.5–5.2)
PROT SERPL-MCNC: 6.8 G/DL (ref 6–8.5)
PROT UR STRIP-MCNC: ABNORMAL MG/DL
RBC # BLD AUTO: 3.82 10*6/MM3 (ref 3.77–5.28)
RBC # UR STRIP: ABNORMAL /UL
RH BLD: NEGATIVE
SODIUM SERPL-SCNC: 138 MMOL/L (ref 136–145)
SP GR UR: 1.01 (ref 1–1.03)
T&S EXPIRATION DATE: NORMAL
UROBILINOGEN UR QL: NORMAL
WBC NRBC COR # BLD: 11.2 10*3/MM3 (ref 3.4–10.8)

## 2023-03-23 PROCEDURE — 86850 RBC ANTIBODY SCREEN: CPT | Performed by: MIDWIFE

## 2023-03-23 PROCEDURE — 25010000002 VANCOMYCIN 5 G RECONSTITUTED SOLUTION: Performed by: MIDWIFE

## 2023-03-23 PROCEDURE — 80053 COMPREHEN METABOLIC PANEL: CPT | Performed by: MIDWIFE

## 2023-03-23 PROCEDURE — 86900 BLOOD TYPING SEROLOGIC ABO: CPT | Performed by: MIDWIFE

## 2023-03-23 PROCEDURE — 86922 COMPATIBILITY TEST ANTIGLOB: CPT

## 2023-03-23 PROCEDURE — 81002 URINALYSIS NONAUTO W/O SCOPE: CPT | Performed by: MIDWIFE

## 2023-03-23 PROCEDURE — 86920 COMPATIBILITY TEST SPIN: CPT

## 2023-03-23 PROCEDURE — 85025 COMPLETE CBC W/AUTO DIFF WBC: CPT | Performed by: MIDWIFE

## 2023-03-23 PROCEDURE — 86901 BLOOD TYPING SEROLOGIC RH(D): CPT | Performed by: MIDWIFE

## 2023-03-23 PROCEDURE — 83036 HEMOGLOBIN GLYCOSYLATED A1C: CPT | Performed by: MIDWIFE

## 2023-03-23 PROCEDURE — 86870 RBC ANTIBODY IDENTIFICATION: CPT | Performed by: MIDWIFE

## 2023-03-23 RX ORDER — ONDANSETRON 2 MG/ML
4 INJECTION INTRAMUSCULAR; INTRAVENOUS EVERY 6 HOURS PRN
Status: DISCONTINUED | OUTPATIENT
Start: 2023-03-23 | End: 2023-03-24

## 2023-03-23 RX ORDER — OXYTOCIN/0.9 % SODIUM CHLORIDE 30/500 ML
1-20 PLASTIC BAG, INJECTION (ML) INTRAVENOUS
Status: DISCONTINUED | OUTPATIENT
Start: 2023-03-23 | End: 2023-03-24

## 2023-03-23 RX ORDER — SODIUM CHLORIDE, SODIUM LACTATE, POTASSIUM CHLORIDE, CALCIUM CHLORIDE 600; 310; 30; 20 MG/100ML; MG/100ML; MG/100ML; MG/100ML
125 INJECTION, SOLUTION INTRAVENOUS CONTINUOUS
Status: DISCONTINUED | OUTPATIENT
Start: 2023-03-23 | End: 2023-03-24

## 2023-03-23 RX ORDER — SODIUM CHLORIDE 0.9 % (FLUSH) 0.9 %
1-10 SYRINGE (ML) INJECTION AS NEEDED
Status: DISCONTINUED | OUTPATIENT
Start: 2023-03-23 | End: 2023-03-24

## 2023-03-23 RX ORDER — MAGNESIUM CARB/ALUMINUM HYDROX 105-160MG
30 TABLET,CHEWABLE ORAL ONCE
Status: DISCONTINUED | OUTPATIENT
Start: 2023-03-23 | End: 2023-03-24

## 2023-03-23 RX ORDER — LIDOCAINE HYDROCHLORIDE 10 MG/ML
5 INJECTION, SOLUTION EPIDURAL; INFILTRATION; INTRACAUDAL; PERINEURAL AS NEEDED
Status: DISCONTINUED | OUTPATIENT
Start: 2023-03-23 | End: 2023-03-24

## 2023-03-23 RX ORDER — SODIUM CHLORIDE 0.9 % (FLUSH) 0.9 %
10 SYRINGE (ML) INJECTION EVERY 12 HOURS SCHEDULED
Status: DISCONTINUED | OUTPATIENT
Start: 2023-03-23 | End: 2023-03-24

## 2023-03-23 RX ORDER — MORPHINE SULFATE 2 MG/ML
6 INJECTION, SOLUTION INTRAMUSCULAR; INTRAVENOUS EVERY 4 HOURS PRN
Status: DISCONTINUED | OUTPATIENT
Start: 2023-03-23 | End: 2023-03-24

## 2023-03-23 RX ORDER — HYDROXYZINE HYDROCHLORIDE 25 MG/1
50 TABLET, FILM COATED ORAL NIGHTLY PRN
Status: DISCONTINUED | OUTPATIENT
Start: 2023-03-23 | End: 2023-03-24

## 2023-03-23 RX ORDER — PROMETHAZINE HYDROCHLORIDE 12.5 MG/1
12.5 SUPPOSITORY RECTAL EVERY 6 HOURS PRN
Status: DISCONTINUED | OUTPATIENT
Start: 2023-03-23 | End: 2023-03-24

## 2023-03-23 RX ORDER — MORPHINE SULFATE 4 MG/ML
4 INJECTION, SOLUTION INTRAMUSCULAR; INTRAVENOUS EVERY 4 HOURS PRN
Status: DISCONTINUED | OUTPATIENT
Start: 2023-03-23 | End: 2023-03-24

## 2023-03-23 RX ORDER — PROMETHAZINE HYDROCHLORIDE 12.5 MG/1
12.5 TABLET ORAL EVERY 6 HOURS PRN
Status: DISCONTINUED | OUTPATIENT
Start: 2023-03-23 | End: 2023-03-24

## 2023-03-23 RX ORDER — ONDANSETRON 4 MG/1
4 TABLET, FILM COATED ORAL EVERY 6 HOURS PRN
Status: DISCONTINUED | OUTPATIENT
Start: 2023-03-23 | End: 2023-03-24

## 2023-03-23 RX ADMIN — Medication 1 MILLI-UNITS/MIN: at 22:52

## 2023-03-23 RX ADMIN — Medication 2 MILLI-UNITS/MIN: at 23:55

## 2023-03-23 RX ADMIN — SODIUM CHLORIDE, POTASSIUM CHLORIDE, SODIUM LACTATE AND CALCIUM CHLORIDE 125 ML/HR: 600; 310; 30; 20 INJECTION, SOLUTION INTRAVENOUS at 22:42

## 2023-03-23 RX ADMIN — VANCOMYCIN HYDROCHLORIDE 2500 MG: 500 INJECTION, POWDER, LYOPHILIZED, FOR SOLUTION INTRAVENOUS at 22:41

## 2023-03-24 ENCOUNTER — ANESTHESIA EVENT (OUTPATIENT)
Dept: LABOR AND DELIVERY | Facility: HOSPITAL | Age: 33
End: 2023-03-24
Payer: COMMERCIAL

## 2023-03-24 ENCOUNTER — ANESTHESIA (OUTPATIENT)
Dept: LABOR AND DELIVERY | Facility: HOSPITAL | Age: 33
End: 2023-03-24
Payer: COMMERCIAL

## 2023-03-24 PROBLEM — Z34.90 ENCOUNTER FOR INDUCTION OF LABOR: Status: ACTIVE | Noted: 2023-03-24

## 2023-03-24 LAB
GLUCOSE BLDC GLUCOMTR-MCNC: 100 MG/DL (ref 70–130)
GLUCOSE BLDC GLUCOMTR-MCNC: 100 MG/DL (ref 70–130)
GLUCOSE BLDC GLUCOMTR-MCNC: 101 MG/DL (ref 70–130)
GLUCOSE BLDC GLUCOMTR-MCNC: 114 MG/DL (ref 70–130)
GLUCOSE BLDC GLUCOMTR-MCNC: 151 MG/DL (ref 70–130)
GLUCOSE BLDC GLUCOMTR-MCNC: 170 MG/DL (ref 70–130)
GLUCOSE BLDC GLUCOMTR-MCNC: 99 MG/DL (ref 70–130)
HBA1C MFR BLD: 4.8 % (ref 4.8–5.6)

## 2023-03-24 PROCEDURE — 82962 GLUCOSE BLOOD TEST: CPT

## 2023-03-24 PROCEDURE — 51703 INSERT BLADDER CATH COMPLEX: CPT

## 2023-03-24 PROCEDURE — 63710000001 INSULIN DETEMIR PER 5 UNITS: Performed by: MIDWIFE

## 2023-03-24 PROCEDURE — 0UQMXZZ REPAIR VULVA, EXTERNAL APPROACH: ICD-10-PCS | Performed by: MIDWIFE

## 2023-03-24 PROCEDURE — 4A1HX4Z MONITORING OF PRODUCTS OF CONCEPTION, CARDIAC ELECTRICAL ACTIVITY, EXTERNAL APPROACH: ICD-10-PCS | Performed by: MIDWIFE

## 2023-03-24 PROCEDURE — 25010000002 VANCOMYCIN 5 G RECONSTITUTED SOLUTION: Performed by: MIDWIFE

## 2023-03-24 PROCEDURE — 59410 OBSTETRICAL CARE: CPT | Performed by: MIDWIFE

## 2023-03-24 PROCEDURE — 59025 FETAL NON-STRESS TEST: CPT | Performed by: MIDWIFE

## 2023-03-24 PROCEDURE — 25010000002 FENTANYL CITRATE (PF) 100 MCG/2ML SOLUTION: Performed by: NURSE ANESTHETIST, CERTIFIED REGISTERED

## 2023-03-24 RX ORDER — HYDROCODONE BITARTRATE AND ACETAMINOPHEN 5; 325 MG/1; MG/1
1 TABLET ORAL EVERY 4 HOURS PRN
Status: DISCONTINUED | OUTPATIENT
Start: 2023-03-24 | End: 2023-03-24 | Stop reason: HOSPADM

## 2023-03-24 RX ORDER — DOCUSATE SODIUM 100 MG/1
100 CAPSULE, LIQUID FILLED ORAL 2 TIMES DAILY PRN
Status: DISCONTINUED | OUTPATIENT
Start: 2023-03-24 | End: 2023-03-27 | Stop reason: HOSPADM

## 2023-03-24 RX ORDER — EPHEDRINE SULFATE 5 MG/ML
5 INJECTION INTRAVENOUS
Status: DISCONTINUED | OUTPATIENT
Start: 2023-03-24 | End: 2023-03-24

## 2023-03-24 RX ORDER — ONDANSETRON 4 MG/1
4 TABLET, FILM COATED ORAL EVERY 8 HOURS PRN
Status: DISCONTINUED | OUTPATIENT
Start: 2023-03-24 | End: 2023-03-27 | Stop reason: HOSPADM

## 2023-03-24 RX ORDER — HYDROCORTISONE 25 MG/G
1 CREAM TOPICAL AS NEEDED
Status: DISCONTINUED | OUTPATIENT
Start: 2023-03-24 | End: 2023-03-27 | Stop reason: HOSPADM

## 2023-03-24 RX ORDER — FENTANYL CITRATE 50 UG/ML
INJECTION, SOLUTION INTRAMUSCULAR; INTRAVENOUS
Status: COMPLETED
Start: 2023-03-24 | End: 2023-03-24

## 2023-03-24 RX ORDER — ONDANSETRON 2 MG/ML
4 INJECTION INTRAMUSCULAR; INTRAVENOUS ONCE AS NEEDED
Status: DISCONTINUED | OUTPATIENT
Start: 2023-03-24 | End: 2023-03-24 | Stop reason: HOSPADM

## 2023-03-24 RX ORDER — METHYLERGONOVINE MALEATE 0.2 MG/ML
200 INJECTION INTRAVENOUS ONCE AS NEEDED
Status: DISCONTINUED | OUTPATIENT
Start: 2023-03-24 | End: 2023-03-24 | Stop reason: HOSPADM

## 2023-03-24 RX ORDER — OXYTOCIN/0.9 % SODIUM CHLORIDE 30/500 ML
999 PLASTIC BAG, INJECTION (ML) INTRAVENOUS ONCE
Status: COMPLETED | OUTPATIENT
Start: 2023-03-24 | End: 2023-03-24

## 2023-03-24 RX ORDER — PRENATAL VIT/IRON FUM/FOLIC AC 27MG-0.8MG
1 TABLET ORAL DAILY
Status: DISCONTINUED | OUTPATIENT
Start: 2023-03-25 | End: 2023-03-27 | Stop reason: HOSPADM

## 2023-03-24 RX ORDER — PROMETHAZINE HYDROCHLORIDE 25 MG/1
25 TABLET ORAL EVERY 6 HOURS PRN
Status: DISCONTINUED | OUTPATIENT
Start: 2023-03-24 | End: 2023-03-27 | Stop reason: HOSPADM

## 2023-03-24 RX ORDER — ACETAMINOPHEN 325 MG/1
650 TABLET ORAL ONCE AS NEEDED
Status: DISCONTINUED | OUTPATIENT
Start: 2023-03-24 | End: 2023-03-24 | Stop reason: HOSPADM

## 2023-03-24 RX ORDER — MISOPROSTOL 200 UG/1
800 TABLET ORAL ONCE AS NEEDED
Status: DISCONTINUED | OUTPATIENT
Start: 2023-03-24 | End: 2023-03-24 | Stop reason: HOSPADM

## 2023-03-24 RX ORDER — IBUPROFEN 800 MG/1
800 TABLET ORAL EVERY 8 HOURS PRN
Status: DISCONTINUED | OUTPATIENT
Start: 2023-03-24 | End: 2023-03-27 | Stop reason: HOSPADM

## 2023-03-24 RX ORDER — ACETAMINOPHEN 325 MG/1
650 TABLET ORAL EVERY 6 HOURS PRN
Status: DISCONTINUED | OUTPATIENT
Start: 2023-03-24 | End: 2023-03-27 | Stop reason: HOSPADM

## 2023-03-24 RX ORDER — ONDANSETRON 4 MG/1
4 TABLET, FILM COATED ORAL ONCE AS NEEDED
Status: DISCONTINUED | OUTPATIENT
Start: 2023-03-24 | End: 2023-03-24 | Stop reason: HOSPADM

## 2023-03-24 RX ORDER — TRISODIUM CITRATE DIHYDRATE AND CITRIC ACID MONOHYDRATE 500; 334 MG/5ML; MG/5ML
30 SOLUTION ORAL ONCE
Status: DISCONTINUED | OUTPATIENT
Start: 2023-03-24 | End: 2023-03-24

## 2023-03-24 RX ORDER — SODIUM CHLORIDE 0.9 % (FLUSH) 0.9 %
1-10 SYRINGE (ML) INJECTION AS NEEDED
Status: DISCONTINUED | OUTPATIENT
Start: 2023-03-24 | End: 2023-03-27 | Stop reason: HOSPADM

## 2023-03-24 RX ORDER — MORPHINE SULFATE 4 MG/ML
4 INJECTION, SOLUTION INTRAMUSCULAR; INTRAVENOUS ONCE AS NEEDED
Status: DISCONTINUED | OUTPATIENT
Start: 2023-03-24 | End: 2023-03-24 | Stop reason: HOSPADM

## 2023-03-24 RX ORDER — BISACODYL 10 MG
10 SUPPOSITORY, RECTAL RECTAL DAILY PRN
Status: DISCONTINUED | OUTPATIENT
Start: 2023-03-25 | End: 2023-03-27 | Stop reason: HOSPADM

## 2023-03-24 RX ORDER — HYDROCODONE BITARTRATE AND ACETAMINOPHEN 5; 325 MG/1; MG/1
1 TABLET ORAL EVERY 4 HOURS PRN
Status: DISCONTINUED | OUTPATIENT
Start: 2023-03-24 | End: 2023-03-27 | Stop reason: HOSPADM

## 2023-03-24 RX ORDER — CARBOPROST TROMETHAMINE 250 UG/ML
250 INJECTION, SOLUTION INTRAMUSCULAR ONCE AS NEEDED
Status: DISCONTINUED | OUTPATIENT
Start: 2023-03-24 | End: 2023-03-24 | Stop reason: HOSPADM

## 2023-03-24 RX ORDER — OXYTOCIN/0.9 % SODIUM CHLORIDE 30/500 ML
250 PLASTIC BAG, INJECTION (ML) INTRAVENOUS CONTINUOUS
Status: ACTIVE | OUTPATIENT
Start: 2023-03-24 | End: 2023-03-24

## 2023-03-24 RX ORDER — ONDANSETRON 2 MG/ML
4 INJECTION INTRAMUSCULAR; INTRAVENOUS ONCE AS NEEDED
Status: DISCONTINUED | OUTPATIENT
Start: 2023-03-24 | End: 2023-03-24

## 2023-03-24 RX ORDER — PROMETHAZINE HYDROCHLORIDE 12.5 MG/1
12.5 TABLET ORAL EVERY 6 HOURS PRN
Status: DISCONTINUED | OUTPATIENT
Start: 2023-03-24 | End: 2023-03-24 | Stop reason: HOSPADM

## 2023-03-24 RX ORDER — IBUPROFEN 600 MG/1
600 TABLET ORAL EVERY 6 HOURS PRN
Status: DISCONTINUED | OUTPATIENT
Start: 2023-03-24 | End: 2023-03-24

## 2023-03-24 RX ORDER — PROMETHAZINE HYDROCHLORIDE 12.5 MG/1
12.5 SUPPOSITORY RECTAL EVERY 6 HOURS PRN
Status: DISCONTINUED | OUTPATIENT
Start: 2023-03-24 | End: 2023-03-27 | Stop reason: HOSPADM

## 2023-03-24 RX ORDER — MORPHINE SULFATE 2 MG/ML
2 INJECTION, SOLUTION INTRAMUSCULAR; INTRAVENOUS ONCE AS NEEDED
Status: DISCONTINUED | OUTPATIENT
Start: 2023-03-24 | End: 2023-03-24 | Stop reason: HOSPADM

## 2023-03-24 RX ORDER — FENTANYL CITRATE 50 UG/ML
INJECTION, SOLUTION INTRAMUSCULAR; INTRAVENOUS AS NEEDED
Status: DISCONTINUED | OUTPATIENT
Start: 2023-03-24 | End: 2023-03-27 | Stop reason: SURG

## 2023-03-24 RX ORDER — ONDANSETRON 2 MG/ML
4 INJECTION INTRAMUSCULAR; INTRAVENOUS EVERY 6 HOURS PRN
Status: DISCONTINUED | OUTPATIENT
Start: 2023-03-24 | End: 2023-03-27 | Stop reason: HOSPADM

## 2023-03-24 RX ORDER — HYDROCODONE BITARTRATE AND ACETAMINOPHEN 10; 325 MG/1; MG/1
1 TABLET ORAL EVERY 4 HOURS PRN
Status: DISCONTINUED | OUTPATIENT
Start: 2023-03-24 | End: 2023-03-27 | Stop reason: HOSPADM

## 2023-03-24 RX ADMIN — IBUPROFEN 800 MG: 800 TABLET, FILM COATED ORAL at 15:49

## 2023-03-24 RX ADMIN — Medication 12 ML/HR: at 10:02

## 2023-03-24 RX ADMIN — Medication 3 MILLI-UNITS/MIN: at 00:58

## 2023-03-24 RX ADMIN — SODIUM CHLORIDE, POTASSIUM CHLORIDE, SODIUM LACTATE AND CALCIUM CHLORIDE 125 ML/HR: 600; 310; 30; 20 INJECTION, SOLUTION INTRAVENOUS at 10:00

## 2023-03-24 RX ADMIN — LIDOCAINE HYDROCHLORIDE 5 ML: 10 INJECTION, SOLUTION EPIDURAL; INFILTRATION; INTRACAUDAL; PERINEURAL at 12:10

## 2023-03-24 RX ADMIN — ACETAMINOPHEN 650 MG: 325 TABLET, FILM COATED ORAL at 20:36

## 2023-03-24 RX ADMIN — Medication 999 ML/HR: at 12:05

## 2023-03-24 RX ADMIN — SODIUM CHLORIDE, POTASSIUM CHLORIDE, SODIUM LACTATE AND CALCIUM CHLORIDE 1000 ML: 600; 310; 30; 20 INJECTION, SOLUTION INTRAVENOUS at 09:22

## 2023-03-24 RX ADMIN — SODIUM CHLORIDE, POTASSIUM CHLORIDE, SODIUM LACTATE AND CALCIUM CHLORIDE 1000 ML: 600; 310; 30; 20 INJECTION, SOLUTION INTRAVENOUS at 09:44

## 2023-03-24 RX ADMIN — INSULIN DETEMIR 14 UNITS: 100 INJECTION, SOLUTION SUBCUTANEOUS at 22:02

## 2023-03-24 RX ADMIN — Medication: at 15:49

## 2023-03-24 RX ADMIN — FENTANYL CITRATE 25 MCG: 50 INJECTION INTRAMUSCULAR; INTRAVENOUS at 09:53

## 2023-03-24 RX ADMIN — VANCOMYCIN HYDROCHLORIDE 1250 MG: 500 INJECTION, POWDER, LYOPHILIZED, FOR SOLUTION INTRAVENOUS at 11:18

## 2023-03-24 NOTE — PLAN OF CARE
Goal Outcome Evaluation:  Plan of Care Reviewed With: patient, spouse        Progress: improving  Outcome Evaluation: Patient had  @1200 today. Patient VSS and has no complaints of pain. Patient has ambulated in room and able to void x 3. Patient bleeding scant and FF-1. Patient has been breastfeeding infant and responds to infant cues. [facility]. Patient condition stable.

## 2023-03-24 NOTE — PAYOR COMM NOTE
"TO:[facility]  FROM:JUAN CARLOS HAMMONDS,RN PHONE [phone]  INPT NOTIFICATION AND CLINICALS  TAX ID [id number] NPI [id number]   EDC 23@37 5/7 WEEKS  DEDLIVERED 3/24@1159  MALE 8#7OZ APG 8/9    Melvin Jarvis (32 y.o. Female)     Date of Birth   1990    Social Security Number       Address   [address] [place] [address]    Home Phone   [phone]    MRN   [id number]       Voodoo   Sabianist    Marital Status                               Admission Date   3/23/23    Admission Type   Elective    Admitting Provider   Crystal Craven CNM    Attending Provider   Crystal Craven CNM    Department, Room/Bed   [facility], L[facility]       Discharge Date       Discharge Disposition       Discharge Destination                               Attending Provider: Crystal Craven CNM    Allergies: Amoxicillin, Penicillins, Shellfish-derived Products, Sulfa Antibiotics, Latex    Isolation: None   Infection: None   Code Status: CPR    Ht: 165.1 cm (65\")   Wt: 122 kg (269 lb 11.2 oz)    Admission Cmt: None   Principal Problem: None                Active Insurance as of 3/23/2023     Primary Coverage     Payor Plan Insurance Group Employer/Plan Group    [employer] [place] [employer]      Payor Plan Address Payor Plan Phone Number Payor Plan Fax Number Effective Dates    [address] [address] [phone]  1/15/2021 - None Entered    [facility] [address]       Subscriber Name Subscriber Birth Date Member ID       MELVIN JARVIS 1990 [id number]                 Emergency Contacts      (Rel.) Home Phone Work Phone Mobile Phone    MAYELA JARVIS (Spouse) [phone] -- [phone]               History & Physical      Crystal Craven CNM at 23 0800     Attestation signed by Ramirez Flores MD at 23 1142    I agree with the assessment and plan.    Ramirez Flores MD  Obstetrics and Gynecology  [facility]         " "            Vasquez Clark  : 1990  MRN: [id number]  CSN: [id number]    History and Physical    Chief Complaint   Patient presents with   • Scheduled Induction     \"I am here to be induced.\"      Subjective    Melvin Clark is a 32 y.o. year old  with an Estimated Date of Delivery: 23 currently 37w5d presenting for induction of labor for gestational diabetes. Her insulin had to be adjusted several times over the past few weeks. She states ctxs aren't very painful right now.    Risks of labor induction including prolongation of labor, increased risks for both  section and operative vaginal birth have been discussed at length.     Prenatal care has been with SELENA Ovalle.  It has been complicated by GDM - A2 and closely spaced pregnancies. She had low lying placenta that resolved and shortened cervix. Insulin q hs was started at 34 weeks. First PNV on 22 @ 11 weeks with 15 visits. Weight gain = 26 lbs.     OB History    Para Term  AB Living   6 2 2 0 3 2   SAB IAB Ectopic Molar Multiple Live Births   3 0 0 0 0 2      # Outcome Date GA Lbr Jun/2nd Weight Sex Delivery Anes PTL Lv   6 Current            5 Term 22 37w1d  3685 g (8 lb 2 oz) M Vag-Spont EPI N PRANAY      Name: VALENTIN CLARK      Apgar1: 8  Apgar5: 9   4 Term 10/07/09 41w0d  3345 g (7 lb 6 oz) F Vag-Spont EPI N PRANAY      Complications: Postpartum Hemorrhage, Placenta previa   3 SAB 08 9w0d             Birth Comments: d & c   2 SAB 10/16/07 9w0d    SAB         Birth Comments: D & c   1 SAB 10/16/07 9w0d    SAB         Birth Comments: D & C      Obstetric Comments   Fob # 1 - All pregnancy's    Gestational diabetes on glyburide with last baby      Past Medical History:   Diagnosis Date   • Female infertility    • GDM (gestational diabetes mellitus), class A1 2022   • Gestational HTN, third trimester 2022   • Mild intermittent asthma with exacerbation 2022   • " Ovarian cyst    • Placenta previa 2022    current pregnancy   • PMS (premenstrual syndrome)    • Recurrent pregnancy loss, antepartum condition or complication 2006   • Rh incompatibility 2009     Past Surgical History:   Procedure Laterality Date   • CHOLECYSTECTOMY     • D & C WITH SUCTION      2007 2008 2019    • DILATATION AND CURETTAGE     • LAPAROSCOPIC CHOLECYSTECTOMY  2012   • WISDOM TOOTH EXTRACTION         Current Facility-Administered Medications:   •  ePHEDrine Sulfate (Pressors) 5 MG/ML injection 5 mg, 5 mg, Intravenous, Q10 Min PRN, Crystal Craven CNM  •  fentaNYL 2mcg/mL and ropivacaine 0.2% in NS epidural 100 mL, 14 mL/hr, Epidural, Continuous, Crystal Craven CNM  •  hydrOXYzine (ATARAX) tablet 50 mg, 50 mg, Oral, Nightly PRN, Crystal Craven CNM  •  lactated ringers bolus 1,000 mL, 1,000 mL, Intravenous, Once, Crystal Craven CNM  •  lactated ringers bolus 1,000 mL, 1,000 mL, Intravenous, Once, Crystal Craven CNM  •  lactated ringers infusion, 125 mL/hr, Intravenous, Continuous, Crystal Craven CNM, Last Rate: 125 mL/hr at 03/23/23 2242, 125 mL/hr at 03/23/23 2242  •  lidocaine PF 1% (XYLOCAINE) injection 5 mL, 5 mL, Intradermal, PRN, Crystal Craven CNM  •  mineral oil liquid 30 mL, 30 mL, Topical, Once, Crystal Craven CNM  •  Morphine sulfate (PF) injection 4 mg, 4 mg, Intravenous, Q4H PRN, Crystal Craven CNM  •  Morphine sulfate (PF) injection 6 mg, 6 mg, Intravenous, Q4H PRN, Crystal Craven CNM  •  ondansetron (ZOFRAN) tablet 4 mg, 4 mg, Oral, Q6H PRN **OR** ondansetron (ZOFRAN) injection 4 mg, 4 mg, Intravenous, Q6H PRN, Crystal Craven CNM  •  ondansetron (ZOFRAN) injection 4 mg, 4 mg, Intravenous, Once PRN, Foster, Crysatl A, CNM  •  oxytocin (PITOCIN) 30 units in 0.9% sodium chloride 500 mL (premix), 1-20 keisha-units/min, Intravenous, Titrated, Crystal Craven CNM, Last Rate: 14 mL/hr at 03/24/23 0715, 14 keisha-units/min at 03/24/23 0715  •   "[DISCONTINUED] vancomycin 2500 mg/500 mL 0.9% NS IVPB ([facility]), 20 mg/kg, Intravenous, Q8H, 2,500 mg at 23 6371 **AND** Pharmacy to dose vancomycin, , Does not apply, Continuous PRN, Crystal Craven CNM  •  promethazine (PHENERGAN) suppository 12.5 mg, 12.5 mg, Rectal, Q6H PRN **OR** promethazine (PHENERGAN) tablet 12.5 mg, 12.5 mg, Oral, Q6H PRN, Crystal Craven CNM  •  Sod Citrate-Citric Acid (BICITRA) solution 30 mL, 30 mL, Oral, Once, Crystal Craven CNM  •  sodium chloride 0.9 % flush 1-10 mL, 1-10 mL, Intravenous, PRN, Crystal Craven CNM  •  sodium chloride 0.9 % flush 10 mL, 10 mL, Intravenous, Q12H, Crystal Craven CNM  •  vancomycin 1250 mg/250 mL 0.9% NS IVPB ([facility]), 1,250 mg, Intravenous, Q12H, Crystal Craven, CNM    Allergies   Allergen Reactions   • Amoxicillin Hives   • Penicillins Hives   • Shellfish-Derived Products Hives   • Sulfa Antibiotics Hives and Itching   • Latex Hives     Powdered gloves      Social History    Tobacco Use      Smoking status: Former        Packs/day: 1.50        Years: 15.00        Pack years: 22.5        Types: Cigarettes, Electronic Cigarette        Quit date: 2021        Years since quittin.0      Smokeless tobacco: Never    Review of Systems   Constitutional: Negative.    Respiratory: Negative.    Cardiovascular: Negative.    Gastrointestinal: Negative.    Genitourinary: Negative.    Musculoskeletal: Negative.    Psychiatric/Behavioral: Negative.    All other systems reviewed and are negative.       Objective    /70   Pulse 88   Temp 97.6 °F (36.4 °C) (Oral)   Resp 16   Ht 165.1 cm (65\")   Wt 122 kg (269 lb 11.2 oz)   SpO2 97%   BMI 44.88 kg/m²                                           General:  well developed; well nourished  no acute distress   Neck:    Heart:   supple and no masses  normal apical impulse  regular rate and rhythm   Lungs: breathing is unlabored  clear to auscultation bilaterally   Abdomen: soft, non-tender; no " masses  gravid  EFW: 8-9 #, growth scan @ 32 weeks 57%, HC 58%, AC 70%   FHT's: reassuring, reactive and category 1   Cervix: was checked (by me): 5 cm / 70 % / -2, posterior   Presentation: cephalic   Contractions: regular - external monitors used Pitocin @ 16 mu   Extremities:   normal appearance with no cyanosis or edema and no calf tenderness   Skin:  Skin color, texture, turgor normal. No rashes or lesions or Skin warm and dry   Psych:  Normal. and Alert and oriented, appropriate affect.       Prenatal Labs  Lab Results   Component Value Date    HGB 11.5 (L) 03/23/2023    HEPBSAG Negative 09/20/2022    ABSCRN Positive 03/23/2023    RBF9VFX2 Non Reactive 09/20/2022    HEPCVIRUSABY <0.1 09/20/2022    STREPGPB Positive (A) 03/13/2023    URINECX 50,000 CFU/mL Mixed Marine Isolated 02/24/2023       Current Labs Reviewed   Lab Results (last 24 hours)     Procedure Component Value Units Date/Time    POC Glucose Once [[id number]]  (Normal) Collected: 03/24/23 0705    Specimen: Blood Updated: 03/24/23 0723     Glucose 101 mg/dL      Comment: Serial Number: [id number]:  [id number]       POC Glucose Once [[id number]]  (Normal) Collected: 03/24/23 0506    Specimen: Blood Updated: 03/24/23 0512     Glucose 100 mg/dL      Comment: Serial Number: [id number]:  [id number]       Hemoglobin A1c [[id number]]  (Normal) Collected: 03/23/23 2225    Specimen: Blood Updated: 03/24/23 0444     Hemoglobin A1C 4.80 %     Narrative:      Hemoglobin A1C Ranges:    Increased Risk for Diabetes  5.7% to 6.4%  Diabetes                     >= 6.5%  Diabetic Goal                < 7.0%    POC Glucose Once [[id number]]  (Normal) Collected: 03/24/23 0257    Specimen: Blood Updated: 03/24/23 0300     Glucose 114 mg/dL      Comment: Serial Number: [id number]:  [id number]       POC Glucose Once [[id number]]  (Normal) Collected: 03/24/23 0057    Specimen: Blood Updated: 03/24/23 0101     Glucose 99 mg/dL      Comment: Serial Number:  [id number]:  [id number]       Comprehensive Metabolic Panel [[id number]]  (Abnormal) Collected: 03/23/23 2225    Specimen: Blood Updated: 03/23/23 2333     Glucose 131 mg/dL      BUN 11 mg/dL      Creatinine 0.65 mg/dL      Sodium 138 mmol/L      Potassium 3.7 mmol/L      Chloride 106 mmol/L      CO2 19.3 mmol/L      Calcium 8.5 mg/dL      Total Protein 6.8 g/dL      Albumin 3.5 g/dL      ALT (SGPT) 17 U/L      AST (SGOT) 23 U/L      Alkaline Phosphatase 89 U/L      Total Bilirubin 0.3 mg/dL      Globulin 3.3 gm/dL      A/G Ratio 1.1 g/dL      BUN/Creatinine Ratio 16.9     Anion Gap 12.7 mmol/L      eGFR 120.1 mL/min/1.73     Narrative:      GFR Normal >60  Chronic Kidney Disease <60  Kidney Failure <15      POC Urinalysis Dipstick [[id number]]  (Abnormal) Collected: 03/23/23 2140    Specimen: Urine Updated: 03/23/23 2303     Color Yellow     Clarity, UA Clear     Glucose, UA Negative mg/dL      Bilirubin Negative     Ketones, UA Negative     Specific Gravity  1.010     Blood, UA Large     pH, Urine 7.5     Protein, POC 1+ mg/dL      Urobilinogen, UA Normal     Leukocytes Negative     Nitrite, UA Negative    CBC & Differential [[id number]]  (Abnormal) Collected: 03/23/23 2225    Specimen: Blood Updated: 03/23/23 2257    Narrative:      The following orders were created for panel order CBC & Differential.  Procedure                               Abnormality         Status                     ---------                               -----------         ------                     CBC Auto Differential[[id number]]        Abnormal            Final result                 Please view results for these tests on the individual orders.    CBC Auto Differential [[id number]]  (Abnormal) Collected: 03/23/23 2225    Specimen: Blood Updated: 03/23/23 2257     WBC 11.20 10*3/mm3      RBC 3.82 10*6/mm3      Hemoglobin 11.5 g/dL      Hematocrit 34.5 %      MCV 90.3 fL      MCH 30.1 pg      MCHC 33.3 g/dL      RDW 14.3 %      RDW-SD  46.5 fl      MPV 10.8 fL      Platelets 170 10*3/mm3      Neutrophil % 76.1 %      Lymphocyte % 17.4 %      Monocyte % 5.5 %      Eosinophil % 0.4 %      Basophil % 0.2 %      Immature Grans % 0.4 %      Neutrophils, Absolute 8.52 10*3/mm3      Lymphocytes, Absolute 1.95 10*3/mm3      Monocytes, Absolute 0.62 10*3/mm3      Eosinophils, Absolute 0.05 10*3/mm3      Basophils, Absolute 0.02 10*3/mm3      Immature Grans, Absolute 0.04 10*3/mm3      nRBC 0.0 /100 WBC             ASSESSMENT  1. IUP at 37w5d  2. Induction of labor because of gestational diabetes  3. Group B strep status: positive  4. Reactive NST    PLAN  1. Admit to   2. Low dose Pitocin induction and increase per protocol @ 05  3. All procedures explained to patient and spouse. Questions answered and verbalized understanding.  4. Anticipate     Crystal Craven, APRN  3/24/2023  08:00 EDT      Electronically signed by Ramirez Flores MD at 23 1142       Operative/Procedure Notes (last 24 hours)  Notes from 23 1318 through 23 1318   No notes of this type exist for this encounter.         Physician Progress Notes (last 24 hours)  Notes from 23 1318 through 23 1318   No notes of this type exist for this encounter.

## 2023-03-24 NOTE — ANESTHESIA PREPROCEDURE EVALUATION
Anesthesia Evaluation     Patient summary reviewed and Nursing notes reviewed   no history of anesthetic complications:  NPO Solid Status: > 8 hours  NPO Liquid Status: > 8 hours           Airway   Mallampati: I  TM distance: >3 FB  Neck ROM: full  no difficulty expected  Dental - normal exam     Pulmonary - normal exam   (+) asthma,  Cardiovascular - normal exam    (+) hypertension,       Neuro/Psych- negative ROS  GI/Hepatic/Renal/Endo    (+)   diabetes mellitus gestational,     Musculoskeletal (-) negative ROS    Abdominal    Substance History - negative use     OB/GYN    (+) Pregnant,         Other - negative ROS                       Anesthesia Plan    ASA 2     epidural     intravenous induction     Anesthetic plan, risks, benefits, and alternatives have been provided, discussed and informed consent has been obtained with: patient.        CODE STATUS:    Code Status (Patient has no pulse and is not breathing): CPR (Attempt to Resuscitate)  Medical Interventions (Patient has pulse or is breathing): Full

## 2023-03-24 NOTE — NON STRESS TEST
"  Melvin Jarvis, a  at 37w5d with an DAVID of 2023, by Ultrasound, was seen at [facility] for a nonstress test.    Chief Complaint   Patient presents with   • Scheduled Induction     \"I am here to be induced.\"       Patient Active Problem List   Diagnosis   • Mild intermittent asthma with exacerbation   • History of gestational diabetes in prior pregnancy, currently pregnant   • History of gestational hypertension   • Short cervix affecting pregnancy   • Short interval between pregnancies affecting pregnancy, antepartum   • PCOS (polycystic ovarian syndrome)   • GDM, class A2   • Pregnancy       Start Time: 0000   Stop Time: 0100    Interpretation A  Nonstress Test Interpretation A: Reactive                 "

## 2023-03-24 NOTE — PLAN OF CARE
Goal Outcome Evaluation:  Plan of Care Reviewed With: spouse        Progress: improving  Outcome Evaluation: Pt arrived for IOL. Pt started on pitocin for induction. Cervix was 3-4/50%/-3 on admission. Last VE was 4-5/70-80%/-3 around 0420. IV inserted and vancomycin started due to GBS positive. Pt comfortable and tolerating contractions. Pt plans on getting an epidural. Pt blood sugar monitored and has been WNL. Educated pt on labor progression, epidural. Pitocin currently on 4 mu/min. Pt comfortable and changing positions regularly. I&O WNL and VSS.

## 2023-03-24 NOTE — ANESTHESIA PROCEDURE NOTES
CSE Block      Patient reassessed immediately prior to procedure    Reason for block: procedure for pain, at surgeon's request, post-op pain management and labor pain  Staffing  Resident/CRNA: Cristo Craven CRNA  Preanesthetic Checklist  Completed: patient identified, IV checked, site marked, risks and benefits discussed, surgical consent, monitors and equipment checked, pre-op evaluation and timeout performed  CSE  Patient position: sitting  Patient monitoring: blood pressure monitoring and continuous pulse oximetry  Procedures: landmark technique and palpation technique  Spinal Needle  Needle type: Pencan   Needle gauge: 25 G  Approach: midline  Location: L3-4  Fluid Appearance: clear    Epidural Needle  Injection technique: KYLE saline  Needle type: Tuohy   Needle gauge: 18 G  Location: L3-L4  Level: 10-11  Loss of Resistance: 5cm  Cath Depth at Skin (cm): 9  Aspiration: negative  Test dose: negative      Catheter  Catheter type: end hole  Catheter size: 20 G  Assessment  Dressing:occlusive dressing applied and secured with tape  Pt Tolerance:patient tolerated the procedure well with no apparent complications  Complications:no  Additional Notes  Risks discussed , including but not limited to:  Headache, itching, n&v, infection, failure, decreased blood pressure, permanent chronic/back pain, nerve damage, paralysis, etc. All questions answered and informed consent obtained. Skin localized with lidocaine skin wheel. Test dose _ ml of 1.5% lidocaine with 1:200,000 epi.

## 2023-03-24 NOTE — L&D DELIVERY NOTE
[facility]   Vaginal Delivery Note    Patient Name: Melvin Jarvis  : 1990  MRN: [id number]    Date of Delivery: 3/24/2023     Diagnosis     Pre & Post-Delivery:  Intrauterine pregnancy at 37w5d  Labor status:      Pregnancy    Encounter for induction of labor     (spontaneous vaginal delivery)             Problem List    Transfer to Postpartum     Review the Delivery Report for details.     Delivery     Delivery: Vaginal, Spontaneous     YOB: 2023    Time of Birth:  Gestational Age 12:00 PM   37w5d     Anesthesia: Epidural     Delivering clinician: Crystal CRUZ Foster    Forceps?   No   Vacuum? No    Shoulder dystocia present: No        Delivery narrative:  Melvin progressed to C+P and pushed effectively. Mom unable to stop pushing after head delivered. Anterior shoulder was delivered easily with gentle traction and maternal effort followed by posterior shoulder.  viable male.Nuchal cord unwrapped after delivery. Infant stimulated, dried, and placed on mom's abdomen. Infant with lusty cry and spontaneous respirations. Delayed cord double clamped; cut by dad. Cord blood obtained. Placenta delivered schultze intact with 3V cord. Pitocin 20 units IV given. Left periurethral laceration repaired. FF @ U-1, light rubra lochia.       Infant     Findings: male  infant     Infant observations: Weight: 3827 g (8 lb 7 oz)   Length: 21  in  Observations/Comments:        Apgars: 8  @ 1 minute /    9  @ 5 minutes   Infant Name: Stefano     Placenta & Cord         Placenta delivered  Spontaneous  at   3/24/2023 12:05 PM     Cord: 3 vessels  present.   Nuchal Cord?  yes; Number of nuchal loops present:  1    Cord blood obtained: Yes    Cord gases obtained:  No    Cord gas results: Venous:  No results found for: PHCVEN    Arterial:  No results found for: PHCART     Repair      Episiotomy: None     No    Lacerations: Yes  Laceration Information  Laceration Repaired?   Perineal: None       Periurethral: left  Yes    Labial:       Sulcus:       Vaginal:       Cervical:         Suture used for repair: 3-0 chromic gut   Estimated Blood Loss:  350 ml     Quantitative Blood Loss:          Complications     none    Disposition     Mother to Mother Baby/Postpartum  in stable condition currently.  Baby to remains with mom  in stable condition currently.    Crystal Craven CNM  03/24/23  13:26 EDT

## 2023-03-24 NOTE — NURSING NOTE
Tamie Craven, APRN, spoke with RN that patient is to have one hour post pranial blood sugar check after meals and fasting am.

## 2023-03-25 LAB
BASOPHILS # BLD AUTO: 0.01 10*3/MM3 (ref 0–0.2)
BASOPHILS NFR BLD AUTO: 0.1 % (ref 0–1.5)
DEPRECATED RDW RBC AUTO: 47.8 FL (ref 37–54)
EOSINOPHIL # BLD AUTO: 0.09 10*3/MM3 (ref 0–0.4)
EOSINOPHIL NFR BLD AUTO: 1 % (ref 0.3–6.2)
ERYTHROCYTE [DISTWIDTH] IN BLOOD BY AUTOMATED COUNT: 14.4 % (ref 12.3–15.4)
FETAL BLEED: NEGATIVE
GLUCOSE BLDC GLUCOMTR-MCNC: 125 MG/DL (ref 70–130)
GLUCOSE BLDC GLUCOMTR-MCNC: 136 MG/DL (ref 70–130)
GLUCOSE BLDC GLUCOMTR-MCNC: 138 MG/DL (ref 70–130)
GLUCOSE BLDC GLUCOMTR-MCNC: 159 MG/DL (ref 70–130)
HCT VFR BLD AUTO: 29.9 % (ref 34–46.6)
HGB BLD-MCNC: 10 G/DL (ref 12–15.9)
IMM GRANULOCYTES # BLD AUTO: 0.04 10*3/MM3 (ref 0–0.05)
IMM GRANULOCYTES NFR BLD AUTO: 0.5 % (ref 0–0.5)
LYMPHOCYTES # BLD AUTO: 1.87 10*3/MM3 (ref 0.7–3.1)
LYMPHOCYTES NFR BLD AUTO: 21.4 % (ref 19.6–45.3)
MCH RBC QN AUTO: 30.5 PG (ref 26.6–33)
MCHC RBC AUTO-ENTMCNC: 33.4 G/DL (ref 31.5–35.7)
MCV RBC AUTO: 91.2 FL (ref 79–97)
MONOCYTES # BLD AUTO: 0.58 10*3/MM3 (ref 0.1–0.9)
MONOCYTES NFR BLD AUTO: 6.6 % (ref 5–12)
NEUTROPHILS NFR BLD AUTO: 6.15 10*3/MM3 (ref 1.7–7)
NEUTROPHILS NFR BLD AUTO: 70.4 % (ref 42.7–76)
NRBC BLD AUTO-RTO: 0 /100 WBC (ref 0–0.2)
NUMBER OF DOSES: NORMAL
PLATELET # BLD AUTO: 147 10*3/MM3 (ref 140–450)
PMV BLD AUTO: 11.1 FL (ref 6–12)
RBC # BLD AUTO: 3.28 10*6/MM3 (ref 3.77–5.28)
WBC NRBC COR # BLD: 8.74 10*3/MM3 (ref 3.4–10.8)

## 2023-03-25 PROCEDURE — 25010000002 RHO D IMMUNE GLOBULIN 1500 UNIT/2ML SOLUTION PREFILLED SYRINGE: Performed by: MIDWIFE

## 2023-03-25 PROCEDURE — 0503F POSTPARTUM CARE VISIT: CPT | Performed by: OBSTETRICS & GYNECOLOGY

## 2023-03-25 PROCEDURE — 63710000001 INSULIN DETEMIR PER 5 UNITS: Performed by: MIDWIFE

## 2023-03-25 PROCEDURE — 85025 COMPLETE CBC W/AUTO DIFF WBC: CPT | Performed by: MIDWIFE

## 2023-03-25 PROCEDURE — 82962 GLUCOSE BLOOD TEST: CPT

## 2023-03-25 PROCEDURE — 85461 HEMOGLOBIN FETAL: CPT | Performed by: MIDWIFE

## 2023-03-25 RX ORDER — FERROUS SULFATE TAB EC 324 MG (65 MG FE EQUIVALENT) 324 (65 FE) MG
324 TABLET DELAYED RESPONSE ORAL 2 TIMES DAILY WITH MEALS
Status: DISCONTINUED | OUTPATIENT
Start: 2023-03-25 | End: 2023-03-27 | Stop reason: HOSPADM

## 2023-03-25 RX ADMIN — DOCUSATE SODIUM 100 MG: 100 CAPSULE, LIQUID FILLED ORAL at 18:32

## 2023-03-25 RX ADMIN — HUMAN RHO(D) IMMUNE GLOBULIN 1500 UNITS: 1500 SOLUTION INTRAMUSCULAR; INTRAVENOUS at 12:00

## 2023-03-25 RX ADMIN — INSULIN DETEMIR 14 UNITS: 100 INJECTION, SOLUTION SUBCUTANEOUS at 20:26

## 2023-03-25 RX ADMIN — FERROUS SULFATE TAB EC 324 MG (65 MG FE EQUIVALENT) 324 MG: 324 (65 FE) TABLET DELAYED RESPONSE at 18:32

## 2023-03-25 RX ADMIN — PRENATAL VITAMINS-IRON FUMARATE 27 MG IRON-FOLIC ACID 0.8 MG TABLET 1 TABLET: at 08:08

## 2023-03-25 RX ADMIN — ACETAMINOPHEN 650 MG: 325 TABLET, FILM COATED ORAL at 08:08

## 2023-03-25 RX ADMIN — ACETAMINOPHEN 650 MG: 325 TABLET, FILM COATED ORAL at 20:26

## 2023-03-25 RX ADMIN — FERROUS SULFATE TAB EC 324 MG (65 MG FE EQUIVALENT) 324 MG: 324 (65 FE) TABLET DELAYED RESPONSE at 11:59

## 2023-03-25 RX ADMIN — IBUPROFEN 800 MG: 800 TABLET, FILM COATED ORAL at 18:32

## 2023-03-25 NOTE — PLAN OF CARE
Goal Outcome Evaluation:  Plan of Care Reviewed With: patient           Outcome Evaluation: VSS, pt has minimal complaints of pain, pt has been very pleasant and cooperative, pt is pumping, pt is bonding with baby.

## 2023-03-25 NOTE — PROGRESS NOTES
Daily Progress Note    Service: OB/GYN        Hospital Day: 3   Attending: Crystal Craven CNM     Diagnoses:         PPD#1 s/p  at 37+5 weeks  A2DM, poorly-controlled  Short interval pregnancy  Asthma  BMI 44    Subjective:                                                                                            No acute issues in the past 24 hours   Nausea/vomiting: no nausea and no vomiting  BM/Flatus: yes  Voiding: yes  Pain is well controlled with current regimen.  Ambulating: yes    ROS: She denies chest pain, shortness of breath, dizziness, lightheadedness, leg pain or leg swelling. All other systems reviewed and negative.    Objective:      24hour vital signs:   Temp:  [97.9 °F (36.6 °C)-98.5 °F (36.9 °C)] 97.9 °F (36.6 °C)  Heart Rate:  [] 80  Resp:  [16-18] 16  BP: ()/() 115/70     I/O last 3 completed shifts:  In: 250 [IV Piggyback:250]  Out: 2413 [Urine:2250; Blood:163]     General:    alert and no distress   HEENT:   NCAT, MMM   Cardiovascular:   RRR, no murmurs   Pulmonary:    CTAB, no wheezing, no crackles, unlabored breathing   Abdomen:  active bowel sounds, no TTP, no distension       Extremities:  no edema, wwp, SCDs in place b/l        Labs:  Results from last 7 days   Lab Units 23  0643 23  2225   WBC 10*3/mm3 8.74 11.20*   HEMOGLOBIN g/dL 10.0* 11.5*   HEMATOCRIT % 29.9* 34.5   PLATELETS 10*3/mm3 147 170           Results from last 7 days   Lab Units 23  2225   SODIUM mmol/L 138   POTASSIUM mmol/L 3.7   CHLORIDE mmol/L 106   CO2 mmol/L 19.3*   BUN mg/dL 11   CREATININE mg/dL 0.65   GLUCOSE mg/dL 131*   CALCIUM mg/dL 8.5*        Other Notable Lab and Imaging Results:      Medications:  Scheduled Meds:  ferrous sulfate, 324 mg, Oral, BID With Meals  insulin detemir, 14 Units, Subcutaneous, Nightly  prenatal vitamin, 1 tablet, Oral, Daily           Continuous Infusions:           PRN Meds:  •  acetaminophen  •  benzocaine-menthol  •  bisacodyl  •  docusate  sodium  •  HYDROcodone-acetaminophen **OR** HYDROcodone-acetaminophen  •  Hydrocortisone (Perianal)  •  ibuprofen  •  lanolin  •  magnesium hydroxide  •  Measles, Mumps & Rubella Vac  •  ondansetron  •  ondansetron  •  pramoxine-hydrocortisone  •  promethazine **OR** promethazine  •  sodium chloride      .Assessment:     32 y.o. year old  female  PPD#1 s/p  at 37+5 weeks  A2DM, poorly-controlled  Short interval pregnancy  Asthma  BMI 44    Plan:     - Pain: well controlled with current regimen  - CV: No issues  - DVT ppx: SCD's, ambulate TID  - Pulm: IS bedside  - GI: Phenergan/Zofran prn nausea  - : No voiding issues. Adequate UOP.   - Heme: PP Hgb 10.0 --> iron   - ID: No current issues  - FEN: SLIV. Regular diet, advance as tolerated. Replete lytes prn.  - Endocrine: FSBG values are reasonable.  Continue Lantus 14 units nightly.  Continue fingerstick values fasting + postprandial.    Ramirez Flores MD  Obstetrics and Gynecology  [facility]

## 2023-03-25 NOTE — PROGRESS NOTES
Patient: Melvin Jarvis  * No surgery found *  Anesthesia type: No value filed.    Patient location: Labor and Delivery  Last vitals:   Vitals:    03/25/23 0900   BP: 115/70   Pulse: 80   Resp: 16   Temp: 97.9 °F (36.6 °C)   SpO2: 97%     Level of consciousness: awake, alert and oriented    Post-anesthesia pain: adequate analgesia  Airway patency: patent  Respiratory: unassisted  Cardiovascular: stable and blood pressure at baseline  Hydration: euvolemic    Anesthetic complications: no

## 2023-03-26 LAB
GLUCOSE BLDC GLUCOMTR-MCNC: 122 MG/DL (ref 70–130)
GLUCOSE BLDC GLUCOMTR-MCNC: 167 MG/DL (ref 70–130)

## 2023-03-26 PROCEDURE — 0503F POSTPARTUM CARE VISIT: CPT | Performed by: OBSTETRICS & GYNECOLOGY

## 2023-03-26 PROCEDURE — 82962 GLUCOSE BLOOD TEST: CPT

## 2023-03-26 PROCEDURE — 63710000001 INSULIN DETEMIR PER 5 UNITS: Performed by: MIDWIFE

## 2023-03-26 RX ADMIN — INSULIN DETEMIR 14 UNITS: 100 INJECTION, SOLUTION SUBCUTANEOUS at 21:10

## 2023-03-26 RX ADMIN — IBUPROFEN 800 MG: 800 TABLET, FILM COATED ORAL at 18:11

## 2023-03-26 RX ADMIN — FERROUS SULFATE TAB EC 324 MG (65 MG FE EQUIVALENT) 324 MG: 324 (65 FE) TABLET DELAYED RESPONSE at 18:11

## 2023-03-26 RX ADMIN — IBUPROFEN 800 MG: 800 TABLET, FILM COATED ORAL at 08:40

## 2023-03-26 RX ADMIN — PRENATAL VITAMINS-IRON FUMARATE 27 MG IRON-FOLIC ACID 0.8 MG TABLET 1 TABLET: at 08:40

## 2023-03-26 RX ADMIN — DOCUSATE SODIUM 100 MG: 100 CAPSULE, LIQUID FILLED ORAL at 08:40

## 2023-03-26 RX ADMIN — FERROUS SULFATE TAB EC 324 MG (65 MG FE EQUIVALENT) 324 MG: 324 (65 FE) TABLET DELAYED RESPONSE at 08:40

## 2023-03-26 NOTE — PROGRESS NOTES
Daily Progress Note    Service: OB/GYN        Hospital Day: 4   Attending: Crystal Craven CNM     Diagnoses:         PPD#2 s/p  at 37+5 weeks  A2DM, poorly-controlled  Short interval pregnancy  Asthma  BMI 44    Subjective:                                                                                            No acute issues in the past 24 hours   Nausea/vomiting: no nausea and no vomiting  BM/Flatus: yes  Voiding: yes  Pain is well controlled with current regimen.  Ambulating: yes    ROS: She denies chest pain, shortness of breath, dizziness, lightheadedness, leg pain or leg swelling. All other systems reviewed and negative.    Objective:      24hour vital signs:   Temp:  [97.7 °F (36.5 °C)-98.2 °F (36.8 °C)] 98.2 °F (36.8 °C)  Heart Rate:  [75-83] 83  Resp:  [16-18] 18  BP: (113-120)/(60-71) 115/70     I/O last 3 completed shifts:  In: 720 [P.O.:720]  Out: 550 [Urine:550]     General:    alert and no distress   HEENT:   NCAT, MMM   Cardiovascular:   RRR, no murmurs   Pulmonary:    CTAB, no wheezing, no crackles, unlabored breathing   Abdomen:  active bowel sounds, no TTP, no distension       Extremities:  no edema, wwp, SCDs in place b/l        Labs:  Results from last 7 days   Lab Units 23  0643 23  2225   WBC 10*3/mm3 8.74 11.20*   HEMOGLOBIN g/dL 10.0* 11.5*   HEMATOCRIT % 29.9* 34.5   PLATELETS 10*3/mm3 147 170           Results from last 7 days   Lab Units 23  2225   SODIUM mmol/L 138   POTASSIUM mmol/L 3.7   CHLORIDE mmol/L 106   CO2 mmol/L 19.3*   BUN mg/dL 11   CREATININE mg/dL 0.65   GLUCOSE mg/dL 131*   CALCIUM mg/dL 8.5*        Other Notable Lab and Imaging Results:      Medications:  Scheduled Meds:  ferrous sulfate, 324 mg, Oral, BID With Meals  insulin detemir, 14 Units, Subcutaneous, Nightly  prenatal vitamin, 1 tablet, Oral, Daily           Continuous Infusions:           PRN Meds:  •  acetaminophen  •  benzocaine-menthol  •  bisacodyl  •  docusate sodium  •   HYDROcodone-acetaminophen **OR** HYDROcodone-acetaminophen  •  Hydrocortisone (Perianal)  •  ibuprofen  •  lanolin  •  magnesium hydroxide  •  Measles, Mumps & Rubella Vac  •  ondansetron  •  ondansetron  •  pramoxine-hydrocortisone  •  promethazine **OR** promethazine  •  sodium chloride      .Assessment:     32 y.o. year old  female  PPD#2 s/p  at 37+5 weeks  A2DM, poorly-controlled  Short interval pregnancy  Asthma  BMI 44    Plan:     - Pain: well controlled with current regimen  - CV: No issues  - DVT ppx: SCD's, ambulate TID  - Pulm: IS bedside  - GI: Phenergan/Zofran prn nausea  - : No voiding issues. Adequate UOP.   - Heme: PP Hgb 10.0 --> iron   - ID: No current issues  - FEN: SLIV. Regular diet, advance as tolerated. Replete lytes prn.  - Endocrine: FSBG values are reasonable.  Continue Levemir 14 units nightly.  Continue fingerstick values fasting + postprandial.    Ramirez Flores MD  Obstetrics and Gynecology  [facility]

## 2023-03-27 VITALS
RESPIRATION RATE: 17 BRPM | TEMPERATURE: 98 F | HEIGHT: 65 IN | HEART RATE: 86 BPM | OXYGEN SATURATION: 96 % | SYSTOLIC BLOOD PRESSURE: 128 MMHG | BODY MASS INDEX: 44.93 KG/M2 | WEIGHT: 269.7 LBS | DIASTOLIC BLOOD PRESSURE: 90 MMHG

## 2023-03-27 LAB
BH BB BLOOD EXPIRATION DATE: NORMAL
BH BB BLOOD EXPIRATION DATE: NORMAL
BH BB BLOOD TYPE BARCODE: 2800
BH BB BLOOD TYPE BARCODE: 2800
BH BB DISPENSE STATUS: NORMAL
BH BB DISPENSE STATUS: NORMAL
BH BB PRODUCT CODE: NORMAL
BH BB PRODUCT CODE: NORMAL
BH BB UNIT NUMBER: NORMAL
BH BB UNIT NUMBER: NORMAL
CROSSMATCH INTERPRETATION: NORMAL
CROSSMATCH INTERPRETATION: NORMAL
GLUCOSE BLDC GLUCOMTR-MCNC: 105 MG/DL (ref 70–130)
UNIT  ABO: NORMAL
UNIT  ABO: NORMAL
UNIT  RH: NORMAL
UNIT  RH: NORMAL

## 2023-03-27 PROCEDURE — 82962 GLUCOSE BLOOD TEST: CPT

## 2023-03-27 RX ORDER — ACETAMINOPHEN 325 MG/1
650 TABLET ORAL EVERY 6 HOURS PRN
Qty: 60 TABLET | Refills: 0 | Status: SHIPPED | OUTPATIENT
Start: 2023-03-27

## 2023-03-27 RX ORDER — PSEUDOEPHEDRINE HCL 30 MG
100 TABLET ORAL 2 TIMES DAILY PRN
Qty: 60 CAPSULE | Refills: 1 | Status: SHIPPED | OUTPATIENT
Start: 2023-03-27

## 2023-03-27 RX ORDER — FERROUS SULFATE TAB EC 324 MG (65 MG FE EQUIVALENT) 324 (65 FE) MG
324 TABLET DELAYED RESPONSE ORAL 2 TIMES DAILY WITH MEALS
Qty: 60 TABLET | Refills: 2 | Status: SHIPPED | OUTPATIENT
Start: 2023-03-27

## 2023-03-27 RX ORDER — IBUPROFEN 800 MG/1
800 TABLET ORAL EVERY 8 HOURS PRN
Qty: 30 TABLET | Refills: 0 | Status: SHIPPED | OUTPATIENT
Start: 2023-03-27

## 2023-03-27 RX ADMIN — DOCUSATE SODIUM 100 MG: 100 CAPSULE, LIQUID FILLED ORAL at 08:33

## 2023-03-27 RX ADMIN — PRENATAL VITAMINS-IRON FUMARATE 27 MG IRON-FOLIC ACID 0.8 MG TABLET 1 TABLET: at 08:32

## 2023-03-27 RX ADMIN — FERROUS SULFATE TAB EC 324 MG (65 MG FE EQUIVALENT) 324 MG: 324 (65 FE) TABLET DELAYED RESPONSE at 08:31

## 2023-03-27 NOTE — DISCHARGE SUMMARY
"Discharge Summary     Vasquez Jarvis  : 1990  MRN: [id number]  CSN: [id number]    Date of Admission: 3/23/2023   Date of Discharge:  3/27/2023   Delivering Physician: Crystal Craven        Admission Diagnosis: 1. Pregnancy [Z34.90]  2. Encounter for induction of labor [Z34.90]   3. Gestational diabetes   Discharge Diagnosis: 1. Same as above plus  2. Pregnancy at 37w5d - delivered    3.   Gestational diabetes   4. Anemia   Procedures: 3/24/2023  - Vaginal, Spontaneous       Hospital Course  Patient is a 32 y.o.  who at 37w5d had a vaginal birth.  Her postpartum course was without complications. Her blood sugars were controlled well with Levemir insulin 14 units at hs.  On PPD #3 she was ready for discharge.  She had normal lochia and pain well controlled with oral medications.     Infant  male  fetus weighing 3827 g (8 lb 7 oz)   Apgars -  8 @ 1 minute /  9 @ 5 minutes.    Discharge labs  Lab Results   Component Value Date    WBC 8.74 2023    HGB 10.0 (L) 2023    HCT 29.9 (L) 2023     2023       Discharge Medications     Discharge Medications      New Medications      Instructions Start Date   acetaminophen 325 MG tablet  Commonly known as: TYLENOL   Take 2 tablets by mouth Every 6 (Six) Hours As Needed for Mild Pain, For Mild pain unrelieved by ibuprofen, stagger doses 3 hours after dose of ibuprofen      docusate sodium 100 MG capsule  Commonly known as: COLACE   100 mg, Oral, 2 Times Daily PRN      Easy Touch Insulin Syringe 31G X 16\" 0.3 ML misc  Generic drug: Insulin Syringe-Needle U-100   Use with insulin every night      ferrous sulfate 324 (65 Fe) MG tablet delayed-release EC tablet   324 mg, Oral, 2 Times Daily With Meals      ibuprofen 800 MG tablet  Commonly known as: ADVIL,MOTRIN   Take 1 tablet by mouth Every 8 (Eight) Hours As Needed for Mild Pain      Levemir 100 UNIT/ML injection  Generic drug: insulin detemir  Replaces: Insulin Glargine " 100 UNIT/ML injection pen   14 Units, Subcutaneous, Nightly         Continue These Medications      Instructions Start Date   freestyle lancets   Fasting, 1 hour after breakfast, lunch, dinner      glucose blood test strip   Fasting, 1 hour after breakfast, lunch, dinner      prenatal vitamin 27-0.8 27-0.8 MG tablet tablet   Oral, Daily         Stop These Medications    aspirin 81 MG EC tablet     glyburide 2.5 MG tablet  Commonly known as: DIAbeta     Insulin Glargine 100 UNIT/ML injection pen  Commonly known as: LANTUS SOLOSTAR  Replaced by: Levemir 100 UNIT/ML injection            Discharge Disposition Home or Self Care   Condition on Discharge: good   [place] [facility] [place]-C  3/27/2023

## 2023-03-27 NOTE — PLAN OF CARE
Goal Outcome Evaluation: Patient doing well, pain under control with prescribed pain medication.  No complaints. Discharging per order. Follows up in office in one week, pt states she will make this appointment.

## 2023-03-27 NOTE — PLAN OF CARE
Goal Outcome Evaluation:              Outcome Evaluation: VSS, I/Os adequate, pain controlled with PO medication, positive bonding observed, continue with routine care.

## 2023-03-28 NOTE — PAYOR COMM NOTE
"To:  [employer]  From: Aaliyah Pinto RN  Phone: [phone]  Fax: [phone]  NPI: [id number]  TIN: [id number]  Member ID: [id number]  MRN: [id number]    Jada Jarvis (32 y.o. Female)     Date of Birth   1990    Social Security Number       Address   [facility] [address] [facility] [place] [address]    Home Phone   [phone]    MRN   [id number]       [employer]   Hinduism    Marital Status                               Admission Date   3/23/23    Admission Type   Elective    Admitting Provider   Crystal Craven CNM    Attending Provider       Department, Room/Bed   [facility], W[facility]/       Discharge Date   3/27/2023    Discharge Disposition   Home or Self Care    Discharge Destination                               Attending Provider: (none)   Allergies: Amoxicillin, Penicillins, Shellfish-derived Products, Sulfa Antibiotics, Latex    Isolation: None   Infection: None   Code Status: Prior    Ht: 165.1 cm (65\")   Wt: 122 kg (269 lb 11.2 oz)    Admission Cmt: None   Principal Problem: None                Active Insurance as of 3/23/2023     Primary Coverage     Payor Plan Insurance Group Employer/Plan Group    [employer] [place] [employer]      Payor Plan Address Payor Plan Phone Number Payor Plan Fax Number Effective Dates    [address] [address] [phone]  1/15/2021 - None Entered    [facility] [id number]       Subscriber Name Subscriber Birth Date Member ID       JADA JARVIS 1990 [id number]                 Emergency Contacts      (Rel.) Home Phone Work Phone Mobile Phone    MAYELA JARVIS (Spouse) [phone] -- [phone]               Discharge Summary      Debra Cantrell PA-C at 23 1230          Discharge Summary     Vasquez Jarvis  : 1990  MRN: [id number]  CSN: [id number]    Date of Admission: 3/23/2023   Date of Discharge:  3/27/2023   Delivering Physician: Crystal Craven        Admission Diagnosis: 1. Pregnancy " "[Z34.90]  2. Encounter for induction of labor [Z34.90]   3. Gestational diabetes   Discharge Diagnosis: 1. Same as above plus  2. Pregnancy at 37w5d - delivered    3.   Gestational diabetes   4. Anemia   Procedures: 3/24/2023  - Vaginal, Spontaneous       Hospital Course  Patient is a 32 y.o.  who at 37w5d had a vaginal birth.  Her postpartum course was without complications. Her blood sugars were controlled well with Levemir insulin 14 units at hs.  On PPD #3 she was ready for discharge.  She had normal lochia and pain well controlled with oral medications.     Infant  male  fetus weighing 3827 g (8 lb 7 oz)   Apgars -  8 @ 1 minute /  9 @ 5 minutes.    Discharge labs  Lab Results   Component Value Date    WBC 8.74 2023    HGB 10.0 (L) 2023    HCT 29.9 (L) 2023     2023       Discharge Medications     Discharge Medications      New Medications      Instructions Start Date   acetaminophen 325 MG tablet  Commonly known as: TYLENOL   Take 2 tablets by mouth Every 6 (Six) Hours As Needed for Mild Pain, For Mild pain unrelieved by ibuprofen, stagger doses 3 hours after dose of ibuprofen      docusate sodium 100 MG capsule  Commonly known as: COLACE   100 mg, Oral, 2 Times Daily PRN      Easy Touch Insulin Syringe 31G X 5/16\" 0.3 ML misc  Generic drug: Insulin Syringe-Needle U-100   Use with insulin every night      ferrous sulfate 324 (65 Fe) MG tablet delayed-release EC tablet   324 mg, Oral, 2 Times Daily With Meals      ibuprofen 800 MG tablet  Commonly known as: ADVIL,MOTRIN   Take 1 tablet by mouth Every 8 (Eight) Hours As Needed for Mild Pain      Levemir 100 UNIT/ML injection  Generic drug: insulin detemir  Replaces: Insulin Glargine 100 UNIT/ML injection pen   14 Units, Subcutaneous, Nightly         Continue These Medications      Instructions Start Date   freestyle lancets   Fasting, 1 hour after breakfast, lunch, dinner      glucose blood test strip   Fasting, 1 hour after " breakfast, lunch, dinner      prenatal vitamin 27-0.8 27-0.8 MG tablet tablet   Oral, Daily         Stop These Medications    aspirin 81 MG EC tablet     glyburide 2.5 MG tablet  Commonly known as: DIAbeta     Insulin Glargine 100 UNIT/ML injection pen  Commonly known as: LANTUS SOLOSTAR  Replaced by: Levemir 100 UNIT/ML injection            Discharge Disposition Home or Self Care   Condition on Discharge: good   Follow-up: 1 week with [facility] [place] OLGA Cantrell PA-C  3/27/2023    Electronically signed by Debra Cantrell PA-C at 03/27/23 4402

## 2023-04-13 RX ORDER — LANCETS 33 GAUGE
EACH MISCELLANEOUS
Qty: 100 EACH | Refills: 11 | Status: SHIPPED | OUTPATIENT
Start: 2023-04-13

## 2023-04-14 RX ORDER — BLOOD SUGAR DIAGNOSTIC
STRIP MISCELLANEOUS
Qty: 100 EACH | Refills: 11 | Status: SHIPPED | OUTPATIENT
Start: 2023-04-14

## 2023-05-01 ENCOUNTER — POSTPARTUM VISIT (OUTPATIENT)
Dept: OBSTETRICS AND GYNECOLOGY | Facility: CLINIC | Age: 33
End: 2023-05-01
Payer: COMMERCIAL

## 2023-05-01 VITALS
WEIGHT: 250 LBS | DIASTOLIC BLOOD PRESSURE: 74 MMHG | HEIGHT: 65 IN | SYSTOLIC BLOOD PRESSURE: 126 MMHG | BODY MASS INDEX: 41.65 KG/M2

## 2023-05-01 DIAGNOSIS — Z76.89 ENCOUNTER TO ESTABLISH CARE: ICD-10-CM

## 2023-05-01 DIAGNOSIS — Z30.014 ENCOUNTER FOR INITIAL PRESCRIPTION OF INTRAUTERINE CONTRACEPTIVE DEVICE (IUD): Primary | ICD-10-CM

## 2023-05-01 PROBLEM — O09.299 HISTORY OF GESTATIONAL DIABETES IN PRIOR PREGNANCY, CURRENTLY PREGNANT: Status: RESOLVED | Noted: 2022-12-12 | Resolved: 2023-05-01

## 2023-05-01 PROBLEM — Z87.59 HISTORY OF GESTATIONAL HYPERTENSION: Status: RESOLVED | Noted: 2022-12-12 | Resolved: 2023-05-01

## 2023-05-01 PROBLEM — O26.879 SHORT CERVIX AFFECTING PREGNANCY: Status: RESOLVED | Noted: 2022-12-12 | Resolved: 2023-05-01

## 2023-05-01 PROBLEM — Z34.90 PREGNANCY: Status: RESOLVED | Noted: 2023-03-23 | Resolved: 2023-05-01

## 2023-05-01 PROBLEM — Z34.90 ENCOUNTER FOR INDUCTION OF LABOR: Status: RESOLVED | Noted: 2023-03-24 | Resolved: 2023-05-01

## 2023-05-01 PROBLEM — O24.419 GDM, CLASS A2: Status: RESOLVED | Noted: 2023-01-09 | Resolved: 2023-05-01

## 2023-05-01 PROBLEM — O09.899 SHORT INTERVAL BETWEEN PREGNANCIES AFFECTING PREGNANCY, ANTEPARTUM: Status: RESOLVED | Noted: 2022-12-12 | Resolved: 2023-05-01

## 2023-05-01 PROBLEM — Z86.32 HISTORY OF GESTATIONAL DIABETES IN PRIOR PREGNANCY, CURRENTLY PREGNANT: Status: RESOLVED | Noted: 2022-12-12 | Resolved: 2023-05-01

## 2023-05-01 NOTE — PROGRESS NOTES
"History  Chief Complaint   Patient presents with   • Postpartum Care     No Complaints/concerns         Melvin Jarvis is a 32 y.o. year old  presenting to be seen for her postpartum visit.  She had a vaginal delivery.  She has checked her sugar some but states she doesn't have time to do this. Fasting has been elevated. She doesn't have PCP.    Since delivery she has been sexually active. She used withdrawal.  She does not have concerns about post-partum blues/depression.   She is bottle feeding. She  x 3 weeks  For ongoing contraception, her plans are IUD - Mirena.  She has not had a menstrual cycle.         Physical  /74   Ht 165.1 cm (65\")   Wt 113 kg (250 lb)   Breastfeeding No   BMI 41.60 kg/m²     General:  well developed; well nourished  no acute distress   Abdomen: soft, non-tender; no masses   Pelvis: External genitalia:  normal appearance of the external genitalia including Bartholin's and [place]'s glands.  Uterus:  normal size, shape and consistency.  Adnexa:  normal bimanual exam of the adnexa.        Assessment   1. Normal 6 week postpartum exam  2. GDM A2  3. Contraceptive management     Plan   1. BC options reviewed and compared today: IUD - Mirena  2. Pap smear not due  3. Follow up for Mirena insertion. Discussed best time to insert is with menses but with unprotected sex, advised we could do HCG prior to insertion when not on memses  4. Refer to Primary care [facility]    New Medications Ordered This Visit   Medications   • Levonorgestrel (MIRENA) 20 MCG/DAY intrauterine device IUD     Sig: Take to provider's office     Dispense:  1 each     Refill:  0          This note was electronically signed.  Crystal Craven, RIVKA  2023  "

## 2023-05-05 ENCOUNTER — OFFICE VISIT (OUTPATIENT)
Dept: OBSTETRICS AND GYNECOLOGY | Facility: CLINIC | Age: 33
End: 2023-05-05
Payer: COMMERCIAL

## 2023-05-05 VITALS — WEIGHT: 239.2 LBS | SYSTOLIC BLOOD PRESSURE: 130 MMHG | BODY MASS INDEX: 39.8 KG/M2 | DIASTOLIC BLOOD PRESSURE: 72 MMHG

## 2023-05-05 DIAGNOSIS — Z32.02 NEGATIVE PREGNANCY TEST: ICD-10-CM

## 2023-05-05 DIAGNOSIS — Z30.430 ENCOUNTER FOR IUD INSERTION: Primary | ICD-10-CM

## 2023-05-05 LAB
B-HCG UR QL: NEGATIVE
EXPIRATION DATE: NORMAL
INTERNAL NEGATIVE CONTROL: NEGATIVE
INTERNAL POSITIVE CONTROL: POSITIVE
Lab: NORMAL

## 2023-05-05 NOTE — PROGRESS NOTES
IUD Insertion    Patient's last menstrual period was 05/03/2023.    Date of procedure:  5/5/2023    Risks and benefits discussed? yes  All questions answered? yes  Consents given by The patient  Written consent obtained? yes    Local anesthesia used:  no    Procedure documentation:    After verifying the patient had a low probability of being pregnant and met the criteria for insertion, a sterile speculum has placed and the cervix was cleansed with an antiseptic solution.  Vaginal discharge was scant.  The anterior lip of the cervix was grasped with a tenaculum and the uterine cavity was gently sounded. There was no difficulty passing the sound through the cervix.  Cervical dilation did not need to be performed prior to placing the IUD.  The uterus was retroverted and sounded to 8 cms.  The Mirena was then prepared per the manufacturers instructions.    The Mirena was advanced to a point 2 cms from the fundus and then the arms were released from the sheath.  The device was advanced to the fundus and the device was released fully from the sheath.. The string was cut 2 cms in length.  Bleeding from the cervix was scant.    She tolerated the procedure without any difficulty.    Post procedure instructions: Call if any fever or excessive bleeding or pain.    Follow up needed:  6 weeks to confirm correct placement    This note was electronically signed.    Jack Bran MD

## 2023-05-08 ENCOUNTER — OFFICE VISIT (OUTPATIENT)
Dept: FAMILY MEDICINE CLINIC | Facility: CLINIC | Age: 33
End: 2023-05-08
Payer: COMMERCIAL

## 2023-05-08 VITALS
HEIGHT: 65 IN | HEART RATE: 75 BPM | WEIGHT: 245.8 LBS | BODY MASS INDEX: 40.95 KG/M2 | DIASTOLIC BLOOD PRESSURE: 68 MMHG | SYSTOLIC BLOOD PRESSURE: 105 MMHG | TEMPERATURE: 97.9 F | OXYGEN SATURATION: 95 %

## 2023-05-08 DIAGNOSIS — Z86.32 HISTORY OF GESTATIONAL DIABETES: ICD-10-CM

## 2023-05-08 DIAGNOSIS — E28.2 PCOS (POLYCYSTIC OVARIAN SYNDROME): ICD-10-CM

## 2023-05-08 DIAGNOSIS — R73.9 HYPERGLYCEMIA: Primary | ICD-10-CM

## 2023-05-08 DIAGNOSIS — H92.02 LEFT EAR PAIN: ICD-10-CM

## 2023-05-08 DIAGNOSIS — F41.1 GAD (GENERALIZED ANXIETY DISORDER): ICD-10-CM

## 2023-05-08 RX ORDER — METFORMIN HYDROCHLORIDE 500 MG/1
500 TABLET, EXTENDED RELEASE ORAL 2 TIMES DAILY
Qty: 60 TABLET | Refills: 2 | Status: SHIPPED | OUTPATIENT
Start: 2023-05-08

## 2023-05-08 RX ORDER — CITALOPRAM 20 MG/1
20 TABLET ORAL DAILY
Qty: 30 TABLET | Refills: 1 | Status: SHIPPED | OUTPATIENT
Start: 2023-05-08

## 2023-05-08 NOTE — PROGRESS NOTES
New Patient History and Physical      Referring Physician: Crystal Craven CNM    Subjective     Chief Complaint:    Chief Complaint   Patient presents with   • Gestational Diabetes     Pt had with both pregnancies, still having high glucose but a1c was 4.8 in March 2023       History of Present Illness:   Here as new patient,   Used to see Dr. Gentile office  Referred from GYN, 5 weeks postpartum  Hx of asthma, does not bother her no inhaler  Gestational diabetes with both pregnancies. She did have elevation in glucose after she delivered. She has been off insulin for about a month. AM readings are 120-130. Does not check after meals.   PCOS, used to take metformin, also helped with sugar cravings and maintaining weight.  Would like to restart  2 kids under the age of 1   Ears pain since yesterday on the left   Mild anxiety, used to take celexa would like to restart         Review of Systems   Gen- No fevers, chills  CV- No chest pain, palpitations  Resp- No cough, dyspnea  GI- No N/V/D, abd pain  Neuro-No dizziness, headaches    Past Medical History:   Past Medical History:   Diagnosis Date   • Female infertility    • GDM (gestational diabetes mellitus), class A1 04/07/2022   • Gestational HTN, third trimester 05/16/2022   • Mild intermittent asthma with exacerbation 04/07/2022   • Ovarian cyst    • Placenta previa 2022    current pregnancy   • PMS (premenstrual syndrome)    • Recurrent pregnancy loss, antepartum condition or complication 2006   • Rh incompatibility 2009       Past Surgical History:  Past Surgical History:   Procedure Laterality Date   • CHOLECYSTECTOMY     • D & C WITH SUCTION      2007 2008 2019    • DILATATION AND CURETTAGE     • LAPAROSCOPIC CHOLECYSTECTOMY  2012   • WISDOM TOOTH EXTRACTION         Family History: family history includes Asthma in her mother; Diabetes in her father; Hypertension in her mother.    Social History:  reports that she quit smoking about 2 years ago. Her  "smoking use included cigarettes and electronic cigarette. She has a 22.50 pack-year smoking history. She has never used smokeless tobacco. She reports that she does not drink alcohol and does not use drugs.    Medications:    Current Outpatient Medications:   •  citalopram (CeleXA) 20 MG tablet, Take 1 tablet by mouth Daily., Disp: 30 tablet, Rfl: 1  •  metFORMIN ER (GLUCOPHAGE-XR) 500 MG 24 hr tablet, Take 1 tablet by mouth 2 (Two) Times a Day., Disp: 60 tablet, Rfl: 2    Allergies:  Allergies   Allergen Reactions   • Amoxicillin Hives   • Penicillins Hives   • Shellfish-Derived Products Hives   • Sulfa Antibiotics Hives and Itching   • Latex Hives     Powdered gloves        Objective     Vital Signs:   Vitals:    05/08/23 1616   BP: 105/68   Pulse: 75   Temp: 97.9 °F (36.6 °C)   SpO2: 95%   Weight: 111 kg (245 lb 12.8 oz)   Height: 165.1 cm (65\")   PainSc: 0-No pain     Body mass index is 40.9 kg/m².    Physical Exam:    Physical Exam  Constitutional:       Appearance: Normal appearance. She is well-developed.   HENT:      Head: Normocephalic and atraumatic.      Right Ear: Tympanic membrane, ear canal and external ear normal.      Left Ear: Tympanic membrane, ear canal and external ear normal.      Nose: Nose normal.      Mouth/Throat:      Pharynx: Uvula midline.   Eyes:      Pupils: Pupils are equal, round, and reactive to light.   Cardiovascular:      Rate and Rhythm: Normal rate and regular rhythm.      Heart sounds: Normal heart sounds. No murmur heard.    No friction rub. No gallop.   Pulmonary:      Effort: Pulmonary effort is normal.      Breath sounds: Normal breath sounds.   Abdominal:      General: Bowel sounds are normal.      Palpations: Abdomen is soft.      Tenderness: There is no abdominal tenderness.   Musculoskeletal:      Cervical back: Neck supple.   Lymphadenopathy:      Head:      Right side of head: No submental, submandibular, tonsillar, preauricular or posterior auricular adenopathy.      " Left side of head: No submental, submandibular, tonsillar, preauricular or posterior auricular adenopathy.      Cervical: No cervical adenopathy.   Skin:     General: Skin is warm and dry.      Capillary Refill: Capillary refill takes less than 2 seconds.   Neurological:      General: No focal deficit present.      Mental Status: She is alert and oriented to person, place, and time.   Psychiatric:         Mood and Affect: Mood normal.         Behavior: Behavior normal.         Assessment / Plan     Assessment/Plan:   Problem List Items Addressed This Visit        Endocrine and Metabolic    PCOS (polycystic ovarian syndrome)    Relevant Medications    metFORMIN ER (GLUCOPHAGE-XR) 500 MG 24 hr tablet   Other Visit Diagnoses     Hyperglycemia    -  Primary    Relevant Medications    metFORMIN ER (GLUCOPHAGE-XR) 500 MG 24 hr tablet    Other Relevant Orders    Comprehensive Metabolic Panel    CBC Auto Differential    TSH Rfx On Abnormal To Free T4    Hemoglobin A1c    History of gestational diabetes        Relevant Orders    Comprehensive Metabolic Panel    CBC Auto Differential    TSH Rfx On Abnormal To Free T4    Hemoglobin A1c    NATHAN (generalized anxiety disorder)        Relevant Medications    citalopram (CeleXA) 20 MG tablet    Left ear pain            -- Labs as above  --Trial metformin  --Continue low-carb diet  --Celexa. - Medication discussed, advised to go to ED for any SI/HI,   -- Ear pain likely viral versus allergies, exam looks good    Discussed plan of care in detail with pt today; pt verb understanding and agrees.    I have reviewed pertinent health maintenance applicable to this patient.    Follow up:  Return in about 6 weeks (around 6/19/2023).      Electronically signed by RIVKA Rodriguez   05/08/2023 16:27 EDT      Please note that portions of this note were completed with a voice recognition program.

## 2023-05-09 LAB
ALBUMIN SERPL-MCNC: 4.5 G/DL (ref 3.8–4.8)
ALBUMIN/GLOB SERPL: 1.7 {RATIO} (ref 1.2–2.2)
ALP SERPL-CCNC: 60 IU/L (ref 44–121)
ALT SERPL-CCNC: 38 IU/L (ref 0–32)
AST SERPL-CCNC: 27 IU/L (ref 0–40)
BASOPHILS # BLD AUTO: 0 X10E3/UL (ref 0–0.2)
BASOPHILS NFR BLD AUTO: 1 %
BILIRUB SERPL-MCNC: 0.4 MG/DL (ref 0–1.2)
BUN SERPL-MCNC: 12 MG/DL (ref 6–20)
BUN/CREAT SERPL: 11 (ref 9–23)
CALCIUM SERPL-MCNC: 9.7 MG/DL (ref 8.7–10.2)
CHLORIDE SERPL-SCNC: 103 MMOL/L (ref 96–106)
CO2 SERPL-SCNC: 25 MMOL/L (ref 20–29)
CREAT SERPL-MCNC: 1.13 MG/DL (ref 0.57–1)
EGFRCR SERPLBLD CKD-EPI 2021: 66 ML/MIN/1.73
EOSINOPHIL # BLD AUTO: 0.3 X10E3/UL (ref 0–0.4)
EOSINOPHIL NFR BLD AUTO: 4 %
ERYTHROCYTE [DISTWIDTH] IN BLOOD BY AUTOMATED COUNT: 12.8 % (ref 11.7–15.4)
GLOBULIN SER CALC-MCNC: 2.6 G/DL (ref 1.5–4.5)
GLUCOSE SERPL-MCNC: 83 MG/DL (ref 70–99)
HBA1C MFR BLD: 5.3 % (ref 4.8–5.6)
HCT VFR BLD AUTO: 36.6 % (ref 34–46.6)
HGB BLD-MCNC: 12.1 G/DL (ref 11.1–15.9)
IMM GRANULOCYTES # BLD AUTO: 0 X10E3/UL (ref 0–0.1)
IMM GRANULOCYTES NFR BLD AUTO: 0 %
LYMPHOCYTES # BLD AUTO: 2.2 X10E3/UL (ref 0.7–3.1)
LYMPHOCYTES NFR BLD AUTO: 28 %
MCH RBC QN AUTO: 28.7 PG (ref 26.6–33)
MCHC RBC AUTO-ENTMCNC: 33.1 G/DL (ref 31.5–35.7)
MCV RBC AUTO: 87 FL (ref 79–97)
MONOCYTES # BLD AUTO: 0.6 X10E3/UL (ref 0.1–0.9)
MONOCYTES NFR BLD AUTO: 8 %
NEUTROPHILS # BLD AUTO: 4.6 X10E3/UL (ref 1.4–7)
NEUTROPHILS NFR BLD AUTO: 59 %
PLATELET # BLD AUTO: 250 X10E3/UL (ref 150–450)
POTASSIUM SERPL-SCNC: 3.9 MMOL/L (ref 3.5–5.2)
PROT SERPL-MCNC: 7.1 G/DL (ref 6–8.5)
RBC # BLD AUTO: 4.21 X10E6/UL (ref 3.77–5.28)
SODIUM SERPL-SCNC: 144 MMOL/L (ref 134–144)
TSH SERPL DL<=0.005 MIU/L-ACNC: 2.12 UIU/ML (ref 0.45–4.5)
WBC # BLD AUTO: 7.8 X10E3/UL (ref 3.4–10.8)

## 2023-05-11 ENCOUNTER — PATIENT ROUNDING (BHMG ONLY) (OUTPATIENT)
Dept: FAMILY MEDICINE CLINIC | Facility: CLINIC | Age: 33
End: 2023-05-11
Payer: COMMERCIAL

## 2023-05-11 DIAGNOSIS — R79.89 ABNORMAL BLOOD CREATININE LEVEL: Primary | ICD-10-CM

## 2023-05-11 NOTE — PROGRESS NOTES
Labs show very mild decrease in kidney function.  Thyroid normal.  Hemoglobin A1c normal.  No anemia.  I recommend repeat kidney numbers in a few weeks

## 2023-09-15 ENCOUNTER — OFFICE VISIT (OUTPATIENT)
Dept: FAMILY MEDICINE CLINIC | Facility: CLINIC | Age: 33
End: 2023-09-15
Payer: COMMERCIAL

## 2023-09-15 VITALS
SYSTOLIC BLOOD PRESSURE: 110 MMHG | WEIGHT: 245 LBS | BODY MASS INDEX: 39.37 KG/M2 | HEART RATE: 77 BPM | OXYGEN SATURATION: 97 % | HEIGHT: 66 IN | DIASTOLIC BLOOD PRESSURE: 70 MMHG

## 2023-09-15 DIAGNOSIS — R79.89 ABNORMAL BLOOD CREATININE LEVEL: ICD-10-CM

## 2023-09-15 DIAGNOSIS — F41.1 GAD (GENERALIZED ANXIETY DISORDER): Primary | ICD-10-CM

## 2023-09-15 DIAGNOSIS — E28.2 PCOS (POLYCYSTIC OVARIAN SYNDROME): ICD-10-CM

## 2023-09-15 DIAGNOSIS — R73.9 HYPERGLYCEMIA: ICD-10-CM

## 2023-09-15 PROCEDURE — 99214 OFFICE O/P EST MOD 30 MIN: CPT | Performed by: NURSE PRACTITIONER

## 2023-09-15 PROCEDURE — 1159F MED LIST DOCD IN RCRD: CPT | Performed by: NURSE PRACTITIONER

## 2023-09-15 PROCEDURE — 1160F RVW MEDS BY RX/DR IN RCRD: CPT | Performed by: NURSE PRACTITIONER

## 2023-09-15 RX ORDER — LANCETS 33 GAUGE
EACH MISCELLANEOUS
COMMUNITY
Start: 2023-06-13

## 2023-09-15 RX ORDER — BLOOD SUGAR DIAGNOSTIC
STRIP MISCELLANEOUS
COMMUNITY
Start: 2023-06-13

## 2023-09-15 NOTE — PROGRESS NOTES
"      Subjective     Chief Complaint:    Chief Complaint   Patient presents with    Hyperglycemia    Kidney Follow-up     Pt sts she needs repeat labs done to check kidney function       History of Present Illness:   F/u  Jorge, better on celexa  PCOS/obesity/gestational diabetes, on metformin, had not taken it consistently because she was on GLP-1 from a weight loss clinic. She is not sure if she wants to continue that  Abnormal creatinine on last labs, does not use a lot of nsaids, needs labs redrawn      Review of Systems  Gen- No fevers, chills  CV- No chest pain, palpitations  Resp- No cough, dyspnea  GI- No N/V/D, abd pain  Neuro-No dizziness, headaches      I have reviewed and/or updated the patient's past medical, surgical, family, social history and problem list as appropriate.     Medications:    Current Outpatient Medications:     citalopram (CeleXA) 20 MG tablet, Take 1 tablet by mouth Daily., Disp: 30 tablet, Rfl: 1    Lancets (OneTouch Delica Plus [id number]) misc, , Disp: , Rfl:     metFORMIN ER (GLUCOPHAGE-XR) 500 MG 24 hr tablet, Take 1 tablet by mouth 2 (Two) Times a Day., Disp: 60 tablet, Rfl: 2    OneTouch Ultra test strip, , Disp: , Rfl:     Allergies:  Allergies   Allergen Reactions    Amoxicillin Hives    Penicillins Hives    Shellfish-Derived Products Hives    Sulfa Antibiotics Hives and Itching    Latex Hives     Powdered gloves        Objective     Vital Signs:   Vitals:    09/15/23 0941   BP: 110/70   Pulse: 77   SpO2: 97%   Weight: 111 kg (245 lb)   Height: 167.6 cm (66\")     Body mass index is 39.54 kg/m².    Physical Exam:    Physical Exam  Vitals and nursing note reviewed.   Constitutional:       Appearance: She is well-developed.   HENT:      Head: Normocephalic and atraumatic.   Eyes:      Pupils: Pupils are equal, round, and reactive to light.   Cardiovascular:      Rate and Rhythm: Normal rate and regular rhythm.      Heart sounds: Normal heart sounds.   Pulmonary:      Effort: " Pulmonary effort is normal.      Breath sounds: Normal breath sounds.   Abdominal:      General: Bowel sounds are normal. There is no distension.      Palpations: Abdomen is soft.      Tenderness: There is no abdominal tenderness.   Musculoskeletal:      Cervical back: Neck supple.   Skin:     General: Skin is warm and dry.   Neurological:      General: No focal deficit present.      Mental Status: She is alert and oriented to person, place, and time.   Psychiatric:         Mood and Affect: Mood normal.         Behavior: Behavior normal.       Assessment / Plan     Assessment/Plan:   Problem List Items Addressed This Visit          Endocrine and Metabolic    PCOS (polycystic ovarian syndrome)    Relevant Medications    metFORMIN ER (GLUCOPHAGE-XR) 500 MG 24 hr tablet     Other Visit Diagnoses       NATHAN (generalized anxiety disorder)    -  Primary    Hyperglycemia              -- continue celexa  -- check labs  -- A1c last check was good  -- continue metformin/glp-1 as she wishes    Discussed plan of care in detail with pt today; pt verb understanding and agrees.    Follow up:  6 months     Electronically signed by RIVKA Rodriguez   09/15/2023 09:59 EDT      Please note that portions of this note were completed with a voice recognition program.

## 2023-09-16 LAB
BUN SERPL-MCNC: 10 MG/DL (ref 6–20)
BUN/CREAT SERPL: 14.7 (ref 7–25)
CALCIUM SERPL-MCNC: 9.2 MG/DL (ref 8.6–10.5)
CHLORIDE SERPL-SCNC: 103 MMOL/L (ref 98–107)
CO2 SERPL-SCNC: 26.8 MMOL/L (ref 22–29)
CREAT SERPL-MCNC: 0.68 MG/DL (ref 0.57–1)
EGFRCR SERPLBLD CKD-EPI 2021: 118.1 ML/MIN/1.73
GLUCOSE SERPL-MCNC: 112 MG/DL (ref 65–99)
POTASSIUM SERPL-SCNC: 3.9 MMOL/L (ref 3.5–5.2)
SODIUM SERPL-SCNC: 140 MMOL/L (ref 136–145)

## 2023-11-21 DIAGNOSIS — F41.1 GAD (GENERALIZED ANXIETY DISORDER): ICD-10-CM

## 2023-11-21 RX ORDER — CITALOPRAM 20 MG/1
20 TABLET ORAL DAILY
Qty: 30 TABLET | Refills: 0 | Status: SHIPPED | OUTPATIENT
Start: 2023-11-21

## 2024-03-11 DIAGNOSIS — F41.1 GAD (GENERALIZED ANXIETY DISORDER): ICD-10-CM

## 2024-03-11 DIAGNOSIS — R73.9 HYPERGLYCEMIA: ICD-10-CM

## 2024-03-11 DIAGNOSIS — E28.2 PCOS (POLYCYSTIC OVARIAN SYNDROME): ICD-10-CM

## 2024-03-12 RX ORDER — CITALOPRAM 20 MG/1
20 TABLET ORAL DAILY
Qty: 30 TABLET | Refills: 0 | Status: SHIPPED | OUTPATIENT
Start: 2024-03-12

## 2024-03-12 RX ORDER — METFORMIN HYDROCHLORIDE 500 MG/1
500 TABLET, EXTENDED RELEASE ORAL 2 TIMES DAILY
Qty: 60 TABLET | Refills: 1 | Status: SHIPPED | OUTPATIENT
Start: 2024-03-12

## 2024-08-14 ENCOUNTER — OFFICE VISIT (OUTPATIENT)
Dept: FAMILY MEDICINE CLINIC | Facility: CLINIC | Age: 34
End: 2024-08-14
Payer: COMMERCIAL

## 2024-08-14 VITALS — HEIGHT: 65 IN | BODY MASS INDEX: 37.82 KG/M2 | WEIGHT: 227 LBS

## 2024-08-14 DIAGNOSIS — E66.01 CLASS 2 SEVERE OBESITY DUE TO EXCESS CALORIES WITH SERIOUS COMORBIDITY AND BODY MASS INDEX (BMI) OF 37.0 TO 37.9 IN ADULT: ICD-10-CM

## 2024-08-14 DIAGNOSIS — E28.2 PCOS (POLYCYSTIC OVARIAN SYNDROME): ICD-10-CM

## 2024-08-14 DIAGNOSIS — B02.9 HERPES ZOSTER WITHOUT COMPLICATION: Primary | ICD-10-CM

## 2024-08-14 DIAGNOSIS — R73.03 PREDIABETES: ICD-10-CM

## 2024-08-14 DIAGNOSIS — F41.1 GAD (GENERALIZED ANXIETY DISORDER): ICD-10-CM

## 2024-08-14 DIAGNOSIS — R73.9 HYPERGLYCEMIA: ICD-10-CM

## 2024-08-14 PROCEDURE — 99214 OFFICE O/P EST MOD 30 MIN: CPT | Performed by: NURSE PRACTITIONER

## 2024-08-14 RX ORDER — VALACYCLOVIR HYDROCHLORIDE 1 G/1
1000 TABLET, FILM COATED ORAL 3 TIMES DAILY
Qty: 30 TABLET | Refills: 0 | Status: SHIPPED | OUTPATIENT
Start: 2024-08-14 | End: 2024-08-15 | Stop reason: SDUPTHER

## 2024-08-14 RX ORDER — CITALOPRAM HYDROBROMIDE 10 MG/1
10 TABLET ORAL DAILY
Qty: 90 TABLET | Refills: 1 | Status: SHIPPED | OUTPATIENT
Start: 2024-08-14 | End: 2024-08-15 | Stop reason: SDUPTHER

## 2024-08-14 RX ORDER — ERGOCALCIFEROL 1.25 MG/1
CAPSULE ORAL
COMMUNITY
Start: 2024-06-03

## 2024-08-14 RX ORDER — SEMAGLUTIDE 0.25 MG/.5ML
0.25 INJECTION, SOLUTION SUBCUTANEOUS WEEKLY
Qty: 2 ML | Refills: 0 | Status: SHIPPED | OUTPATIENT
Start: 2024-08-14 | End: 2024-08-15 | Stop reason: SDUPTHER

## 2024-08-14 RX ORDER — METFORMIN HYDROCHLORIDE 500 MG/1
500 TABLET, EXTENDED RELEASE ORAL 2 TIMES DAILY
Qty: 180 TABLET | Refills: 1 | Status: SHIPPED | OUTPATIENT
Start: 2024-08-14 | End: 2024-08-15 | Stop reason: SDUPTHER

## 2024-08-14 NOTE — PROGRESS NOTES
Subjective     Chief Complaint:    Chief Complaint   Patient presents with    Blister     Pt sts has on L side and pretty sure it is shingles       History of Present Illness:   Blistering rash on left side, does have hx of shingles, has pain, itching, burning, pins and needles, hurts when clothes touch it, has not put anything on it  Feels some malaise and fatigue   Obesity, trouble losing weight, struggles with weight loss even when complaint with diet and exercise  NATHAN, celexa, controlled  PCOS/hyperglycemia/prediabetes, metformin     Review of Systems  Gen- No fevers, chills  CV- No chest pain, palpitations  Resp- No cough, dyspnea  GI- No N/V/D, abd pain  Neuro-No dizziness, headaches      I have reviewed and/or updated the patient's past medical, surgical, family, social history and problem list as appropriate.     Medications:    Current Outpatient Medications:     citalopram (CeleXA) 10 MG tablet, Take 1 tablet by mouth Daily., Disp: 90 tablet, Rfl: 1    Lancets (OneTouch Delica Plus [id number]) misc, , Disp: , Rfl:     metFORMIN ER (GLUCOPHAGE-XR) 500 MG 24 hr tablet, Take 1 tablet by mouth 2 (Two) Times a Day., Disp: 180 tablet, Rfl: 1    OneTouch Ultra test strip, , Disp: , Rfl:     vitamin D (ERGOCALCIFEROL) 1.25 MG (42167 UT) capsule capsule, TAKE 1 CAPSULE BY MOUTH 3 TIMES WEEKLY ON MONDAY/ WEDNESDAY/ FRIDAY, Disp: , Rfl:     Semaglutide-Weight Management (Wegovy) 0.25 MG/0.5ML solution auto-injector, Inject 0.5 mL under the skin into the appropriate area as directed 1 (One) Time Per Week., Disp: 2 mL, Rfl: 0    valACYclovir (Valtrex) 1000 MG tablet, Take 1 tablet by mouth 3 (Three) Times a Day for 10 days., Disp: 30 tablet, Rfl: 0    Allergies:  Allergies   Allergen Reactions    Amoxicillin Hives    Penicillins Hives    Shellfish-Derived Products Hives    Sulfa Antibiotics Hives and Itching    Latex Hives     Powdered gloves        Objective     Vital Signs:   Vitals:    08/14/24 1324   Weight:  "103 kg (227 lb)   Height: 165.1 cm (65\")     Body mass index is 37.77 kg/m².    Physical Exam:    Physical Exam  Vitals and nursing note reviewed.   Constitutional:       Appearance: She is well-developed.   HENT:      Head: Normocephalic and atraumatic.   Eyes:      Pupils: Pupils are equal, round, and reactive to light.   Cardiovascular:      Rate and Rhythm: Normal rate and regular rhythm.      Heart sounds: Normal heart sounds.   Pulmonary:      Effort: Pulmonary effort is normal.      Breath sounds: Normal breath sounds.   Abdominal:      General: Bowel sounds are normal. There is no distension.      Palpations: Abdomen is soft.      Tenderness: There is no abdominal tenderness.   Musculoskeletal:      Cervical back: Neck supple.   Skin:     General: Skin is warm and dry.      Findings: Rash (vesicular rash noted on chest and back on left, does not cross midline) present.   Neurological:      General: No focal deficit present.      Mental Status: She is alert and oriented to person, place, and time.   Psychiatric:         Mood and Affect: Mood normal.         Behavior: Behavior normal.         Assessment / Plan     Assessment/Plan:   Problem List Items Addressed This Visit       PCOS (polycystic ovarian syndrome)    Relevant Medications    Semaglutide-Weight Management (Wegovy) 0.25 MG/0.5ML solution auto-injector    metFORMIN ER (GLUCOPHAGE-XR) 500 MG 24 hr tablet     Other Visit Diagnoses       Herpes zoster without complication    -  Primary    Relevant Medications    valACYclovir (Valtrex) 1000 MG tablet    Class 2 severe obesity due to excess calories with serious comorbidity and body mass index (BMI) of 37.0 to 37.9 in adult        Relevant Medications    Semaglutide-Weight Management (Wegovy) 0.25 MG/0.5ML solution auto-injector    Prediabetes        Relevant Medications    Semaglutide-Weight Management (Wegovy) 0.25 MG/0.5ML solution auto-injector    NATHAN (generalized anxiety disorder)        Relevant " Medications    Semaglutide-Weight Management (Wegovy) 0.25 MG/0.5ML solution auto-injector    citalopram (CeleXA) 10 MG tablet    Hyperglycemia        Relevant Medications    metFORMIN ER (GLUCOPHAGE-XR) 500 MG 24 hr tablet          -- valtrex  -- trial wegovy, continue diet and exercise        Discussed plan of care in detail with pt today; pt verb understanding and agrees.    Follow up:  3 months     Electronically signed by RIVKA Rodriguez   08/14/2024 13:31 EDT      Please note that portions of this note were completed with a voice recognition program.

## 2024-08-15 DIAGNOSIS — R73.9 HYPERGLYCEMIA: ICD-10-CM

## 2024-08-15 DIAGNOSIS — R73.03 PREDIABETES: ICD-10-CM

## 2024-08-15 DIAGNOSIS — B02.9 HERPES ZOSTER WITHOUT COMPLICATION: ICD-10-CM

## 2024-08-15 DIAGNOSIS — E66.01 CLASS 2 SEVERE OBESITY DUE TO EXCESS CALORIES WITH SERIOUS COMORBIDITY AND BODY MASS INDEX (BMI) OF 37.0 TO 37.9 IN ADULT: ICD-10-CM

## 2024-08-15 DIAGNOSIS — F41.1 GAD (GENERALIZED ANXIETY DISORDER): ICD-10-CM

## 2024-08-15 DIAGNOSIS — E28.2 PCOS (POLYCYSTIC OVARIAN SYNDROME): ICD-10-CM

## 2024-08-15 RX ORDER — CITALOPRAM HYDROBROMIDE 10 MG/1
10 TABLET ORAL DAILY
Qty: 90 TABLET | Refills: 1 | Status: SHIPPED | OUTPATIENT
Start: 2024-08-15

## 2024-08-15 RX ORDER — SEMAGLUTIDE 0.25 MG/.5ML
0.25 INJECTION, SOLUTION SUBCUTANEOUS WEEKLY
Qty: 2 ML | Refills: 0 | Status: SHIPPED | OUTPATIENT
Start: 2024-08-15

## 2024-08-15 RX ORDER — VALACYCLOVIR HYDROCHLORIDE 1 G/1
1000 TABLET, FILM COATED ORAL 3 TIMES DAILY
Qty: 30 TABLET | Refills: 0 | Status: SHIPPED | OUTPATIENT
Start: 2024-08-15 | End: 2024-08-25

## 2024-08-15 RX ORDER — METFORMIN HYDROCHLORIDE 500 MG/1
500 TABLET, EXTENDED RELEASE ORAL 2 TIMES DAILY
Qty: 180 TABLET | Refills: 1 | Status: SHIPPED | OUTPATIENT
Start: 2024-08-15

## 2024-08-20 ENCOUNTER — PRIOR AUTHORIZATION (OUTPATIENT)
Dept: FAMILY MEDICINE CLINIC | Facility: CLINIC | Age: 34
End: 2024-08-20
Payer: COMMERCIAL

## 2024-08-20 NOTE — TELEPHONE ENCOUNTER
A PRIOR AUTH HAS BEEN STARTED THROUGH [employer] FOR WEGOVY.    WAITING ON A RESPONSE FROM THE INSURANCE.    Key: [id number]

## 2024-09-16 ENCOUNTER — PRIOR AUTHORIZATION (OUTPATIENT)
Dept: FAMILY MEDICINE CLINIC | Facility: CLINIC | Age: 34
End: 2024-09-16
Payer: COMMERCIAL

## 2024-10-04 DIAGNOSIS — Z91.89 AT RISK FOR CORONARY ARTERY DISEASE: ICD-10-CM

## 2024-10-04 DIAGNOSIS — E28.2 PCOS (POLYCYSTIC OVARIAN SYNDROME): ICD-10-CM

## 2024-10-04 DIAGNOSIS — R73.03 PREDIABETES: ICD-10-CM

## 2024-10-04 DIAGNOSIS — E66.01 CLASS 2 SEVERE OBESITY DUE TO EXCESS CALORIES WITH SERIOUS COMORBIDITY AND BODY MASS INDEX (BMI) OF 37.0 TO 37.9 IN ADULT: Primary | ICD-10-CM

## 2024-10-04 DIAGNOSIS — E66.812 CLASS 2 SEVERE OBESITY DUE TO EXCESS CALORIES WITH SERIOUS COMORBIDITY AND BODY MASS INDEX (BMI) OF 37.0 TO 37.9 IN ADULT: Primary | ICD-10-CM

## 2024-10-04 DIAGNOSIS — E78.5 HYPERLIPIDEMIA, UNSPECIFIED HYPERLIPIDEMIA TYPE: ICD-10-CM

## 2024-10-04 RX ORDER — SEMAGLUTIDE 0.25 MG/.5ML
0.25 INJECTION, SOLUTION SUBCUTANEOUS WEEKLY
Qty: 2 ML | Refills: 0 | Status: SHIPPED | OUTPATIENT
Start: 2024-10-04

## 2024-10-23 NOTE — H&P
" Vasquez Clark  : 1990  MRN: [id number]  CSN: [id number]    History and Physical    Chief Complaint   Patient presents with   • Scheduled Induction     \"I am here to be induced.\"      Subjective   Melvin Clark is a 32 y.o. year old  with an Estimated Date of Delivery: 23 currently 37w5d presenting for induction of labor for gestational diabetes. Her insulin had to be adjusted several times over the past few weeks. She states ctxs aren't very painful right now.    Risks of labor induction including prolongation of labor, increased risks for both  section and operative vaginal birth have been discussed at length.     Prenatal care has been with SELE[facility] Ovalle.  It has been complicated by GDM - A2 and closely spaced pregnancies. She had low lying placenta that resolved and shortened cervix. Insulin q hs was started at 34 weeks. First PNV on 22 @ 11 weeks with 15 visits. Weight gain = 26 lbs.     OB History    Para Term  AB Living   6 2 2 0 3 2   SAB IAB Ectopic Molar Multiple Live Births   3 0 0 0 0 2      # Outcome Date GA Lbr Jun/2nd Weight Sex Delivery Anes PTL Lv   6 Current            5 Term 22 37w1d  3685 g (8 lb 2 oz) M Vag-Spont EPI N PRANAY      Name: VALENTIN CLARK      Apgar1: 8  Apgar5: 9   4 Term 10/07/09 41w0d  3345 g (7 lb 6 oz) F Vag-Spont EPI N PRANAY      Complications: Postpartum Hemorrhage, Placenta previa   3 SAB 08 9w0d             Birth Comments: d & c   2 SAB 10/16/07 9w0d    SAB         Birth Comments: D & c   1 SAB 10/16/07 9w0d    SAB         Birth Comments: D & C      Obstetric Comments   Fob # 1 - All pregnancy's    Gestational diabetes on glyburide with last baby      Past Medical History:   Diagnosis Date   • Female infertility    • GDM (gestational diabetes mellitus), class A1 2022   • Gestational HTN, third trimester 2022   • Mild intermittent asthma with exacerbation 2022   • Ovarian cyst  " Population Health Chart Review & Patient Outreach Details      Additional [facility] Health Notes:               Updates Requested / Reviewed:      Updated Care Coordination Note and Immunizations Reconciliation Completed or Queried: [place]         Health Maintenance Topics Overdue:      [facility] Score: 1     Osteoporosis Screening    RSV Vaccine                  Health Maintenance Topic(s) Outreach Outcomes & Actions Taken:    Eye Exam - Outreach Outcomes & Actions Taken  : Diabetic Eye External Records Uploaded, Care Team & History Updated if Applicable           • Placenta previa 2022    current pregnancy   • PMS (premenstrual syndrome)    • Recurrent pregnancy loss, antepartum condition or complication 2006   • Rh incompatibility 2009     Past Surgical History:   Procedure Laterality Date   • CHOLECYSTECTOMY     • D & C WITH SUCTION      2007 2008 2019    • DILATATION AND CURETTAGE     • LAPAROSCOPIC CHOLECYSTECTOMY  2012   • WISDOM TOOTH EXTRACTION         Current Facility-Administered Medications:   •  ePHEDrine Sulfate (Pressors) 5 MG/ML injection 5 mg, 5 mg, Intravenous, Q10 Min PRN, Crystal Craven CNM  •  fentaNYL 2mcg/mL and ropivacaine 0.2% in NS epidural 100 mL, 14 mL/hr, Epidural, Continuous, Crystal Craven CNM  •  hydrOXYzine (ATARAX) tablet 50 mg, 50 mg, Oral, Nightly PRN, Crystal Craven CNM  •  lactated ringers bolus 1,000 mL, 1,000 mL, Intravenous, Once, Crystal Craven CNM  •  lactated ringers bolus 1,000 mL, 1,000 mL, Intravenous, Once, Crystal Craven CNM  •  lactated ringers infusion, 125 mL/hr, Intravenous, Continuous, Crystal Craven CNM, Last Rate: 125 mL/hr at 03/23/23 2242, 125 mL/hr at 03/23/23 2242  •  lidocaine PF 1% (XYLOCAINE) injection 5 mL, 5 mL, Intradermal, PRN, Crystal Craven CNM  •  mineral oil liquid 30 mL, 30 mL, Topical, Once, Crystal Craven CNM  •  Morphine sulfate (PF) injection 4 mg, 4 mg, Intravenous, Q4H PRN, Crystal Craven CNM  •  Morphine sulfate (PF) injection 6 mg, 6 mg, Intravenous, Q4H PRN, Crystal Craven CNM  •  ondansetron (ZOFRAN) tablet 4 mg, 4 mg, Oral, Q6H PRN **OR** ondansetron (ZOFRAN) injection 4 mg, 4 mg, Intravenous, Q6H PRN, Crystal Craven CNM  •  ondansetron (ZOFRAN) injection 4 mg, 4 mg, Intravenous, Once PRN, Crystal Craven CNM  •  oxytocin (PITOCIN) 30 units in 0.9% sodium chloride 500 mL (premix), 1-20 keisha-units/min, Intravenous, Titrated, Crystal Craven CNM, Last Rate: 14 mL/hr at 03/24/23 0715, 14 keisha-units/min at 03/24/23 0715  •  [DISCONTINUED]  "vancomycin 2500 mg/500 mL 0.9% NS IVPB ([facility]), 20 mg/kg, Intravenous, Q8H, 2,500 mg at 23 2241 **AND** Pharmacy to dose vancomycin, , Does not apply, Continuous PRN, Crystal Craven CNM  •  promethazine (PHENERGAN) suppository 12.5 mg, 12.5 mg, Rectal, Q6H PRN **OR** promethazine (PHENERGAN) tablet 12.5 mg, 12.5 mg, Oral, Q6H PRN, Crystal Craven CNM  •  Sod Citrate-Citric Acid (BICITRA) solution 30 mL, 30 mL, Oral, Once, Crystal Craven CNM  •  sodium chloride 0.9 % flush 1-10 mL, 1-10 mL, Intravenous, PRN, Crystal Craven CNM  •  sodium chloride 0.9 % flush 10 mL, 10 mL, Intravenous, Q12H, Crystal Craven CNM  •  vancomycin 1250 mg/250 mL 0.9% NS IVPB ([facility]), 1,250 mg, Intravenous, Q12H, Crystal Craven CNM    Allergies   Allergen Reactions   • Amoxicillin Hives   • Penicillins Hives   • Shellfish-Derived Products Hives   • Sulfa Antibiotics Hives and Itching   • Latex Hives     Powdered gloves      Social History    Tobacco Use      Smoking status: Former        Packs/day: 1.50        Years: 15.00        Pack years: 22.5        Types: Cigarettes, Electronic Cigarette        Quit date: 2021        Years since quittin.0      Smokeless tobacco: Never    Review of Systems   Constitutional: Negative.    Respiratory: Negative.    Cardiovascular: Negative.    Gastrointestinal: Negative.    Genitourinary: Negative.    Musculoskeletal: Negative.    Psychiatric/Behavioral: Negative.    All other systems reviewed and are negative.        Objective   /70   Pulse 88   Temp 97.6 °F (36.4 °C) (Oral)   Resp 16   Ht 165.1 cm (65\")   Wt 122 kg (269 lb 11.2 oz)   SpO2 97%   BMI 44.88 kg/m²                                           General:  well developed; well nourished  no acute distress   Neck:    Heart:   supple and no masses  normal apical impulse  regular rate and rhythm   Lungs: breathing is unlabored  clear to auscultation bilaterally   Abdomen: soft, non-tender; no " masses  gravid  EFW: 8-9 #, growth scan @ 32 weeks 57%, HC 58%, AC 70%   FHT's: reassuring, reactive and category 1   Cervix: was checked (by me): 5 cm / 70 % / -2, posterior   Presentation: cephalic   Contractions: regular - external monitors used Pitocin @ 16 mu   Extremities:   normal appearance with no cyanosis or edema and no calf tenderness   Skin:  Skin color, texture, turgor normal. No rashes or lesions or Skin warm and dry   Psych:  Normal. and Alert and oriented, appropriate affect.       Prenatal Labs  Lab Results   Component Value Date    HGB 11.5 (L) 03/23/2023    HEPBSAG Negative 09/20/2022    ABSCRN Positive 03/23/2023    [id number] Non Reactive 09/20/2022    HEPCVIRUSABY <0.1 09/20/2022    STREPGPB Positive (A) 03/13/2023    URINECX 50,000 CFU/mL Mixed Marine Isolated 02/24/2023       Current Labs Reviewed   Lab Results (last 24 hours)     Procedure Component Value Units Date/Time    POC Glucose Once [[id number]]  (Normal) Collected: 03/24/23 0705    Specimen: Blood Updated: 03/24/23 0723     Glucose 101 mg/dL      Comment: Serial Number: [id number]:  [id number]       POC Glucose Once [[id number]]  (Normal) Collected: 03/24/23 0506    Specimen: Blood Updated: 03/24/23 0512     Glucose 100 mg/dL      Comment: Serial Number: [id number]:  [id number]       Hemoglobin A1c [[id number]]  (Normal) Collected: 03/23/23 2225    Specimen: Blood Updated: 03/24/23 0444     Hemoglobin A1C 4.80 %     Narrative:      Hemoglobin A1C Ranges:    Increased Risk for Diabetes  5.7% to 6.4%  Diabetes                     >= 6.5%  Diabetic Goal                < 7.0%    POC Glucose Once [[id number]]  (Normal) Collected: 03/24/23 0257    Specimen: Blood Updated: 03/24/23 0300     Glucose 114 mg/dL      Comment: Serial Number: [id number]:  [id number]       POC Glucose Once [[id number]]  (Normal) Collected: 03/24/23 0057    Specimen: Blood Updated: 03/24/23 0101     Glucose 99 mg/dL      Comment: Serial Number:  [id number]:  [id number]       Comprehensive Metabolic Panel [[id number]]  (Abnormal) Collected: 03/23/23 2225    Specimen: Blood Updated: 03/23/23 2333     Glucose 131 mg/dL      BUN 11 mg/dL      Creatinine 0.65 mg/dL      Sodium 138 mmol/L      Potassium 3.7 mmol/L      Chloride 106 mmol/L      CO2 19.3 mmol/L      Calcium 8.5 mg/dL      Total Protein 6.8 g/dL      Albumin 3.5 g/dL      ALT (SGPT) 17 U/L      AST (SGOT) 23 U/L      Alkaline Phosphatase 89 U/L      Total Bilirubin 0.3 mg/dL      Globulin 3.3 gm/dL      A/G Ratio 1.1 g/dL      BUN/Creatinine Ratio 16.9     Anion Gap 12.7 mmol/L      eGFR 120.1 mL/min/1.73     Narrative:      GFR Normal >60  Chronic Kidney Disease <60  Kidney Failure <15      POC Urinalysis Dipstick [[id number]]  (Abnormal) Collected: 03/23/23 2140    Specimen: Urine Updated: 03/23/23 2303     Color Yellow     Clarity, UA Clear     Glucose, UA Negative mg/dL      Bilirubin Negative     Ketones, UA Negative     Specific Gravity  1.010     Blood, UA Large     pH, Urine 7.5     Protein, POC 1+ mg/dL      Urobilinogen, UA Normal     Leukocytes Negative     Nitrite, UA Negative    CBC & Differential [[id number]]  (Abnormal) Collected: 03/23/23 2225    Specimen: Blood Updated: 03/23/23 2257    Narrative:      The following orders were created for panel order CBC & Differential.  Procedure                               Abnormality         Status                     ---------                               -----------         ------                     CBC Auto Differential[[id number]]        Abnormal            Final result                 Please view results for these tests on the individual orders.    CBC Auto Differential [[id number]]  (Abnormal) Collected: 03/23/23 2225    Specimen: Blood Updated: 03/23/23 2257     WBC 11.20 10*3/mm3      RBC 3.82 10*6/mm3      Hemoglobin 11.5 g/dL      Hematocrit 34.5 %      MCV 90.3 fL      MCH 30.1 pg      MCHC 33.3 g/dL      RDW 14.3 %      RDW-SD  46.5 fl      MPV 10.8 fL      Platelets 170 10*3/mm3      Neutrophil % 76.1 %      Lymphocyte % 17.4 %      Monocyte % 5.5 %      Eosinophil % 0.4 %      Basophil % 0.2 %      Immature Grans % 0.4 %      Neutrophils, Absolute 8.52 10*3/mm3      Lymphocytes, Absolute 1.95 10*3/mm3      Monocytes, Absolute 0.62 10*3/mm3      Eosinophils, Absolute 0.05 10*3/mm3      Basophils, Absolute 0.02 10*3/mm3      Immature Grans, Absolute 0.04 10*3/mm3      nRBC 0.0 /100 WBC              ASSESSMENT  1. IUP at 37w5d  2. Induction of labor because of gestational diabetes  3. Group B strep status: positive  4. Reactive NST    PLAN  1. Admit to   2. Low dose Pitocin induction and increase per protocol @ 05  3. All procedures explained to patient and spouse. Questions answered and verbalized understanding.  4. Anticipate     Crystal Craven, APRN  3/24/2023  08:00 EDT

## 2024-11-20 ENCOUNTER — PRIOR AUTHORIZATION (OUTPATIENT)
Dept: FAMILY MEDICINE CLINIC | Facility: CLINIC | Age: 34
End: 2024-11-20
Payer: COMMERCIAL

## 2024-12-26 ENCOUNTER — OFFICE VISIT (OUTPATIENT)
Dept: OBSTETRICS AND GYNECOLOGY | Facility: CLINIC | Age: 34
End: 2024-12-26
Payer: COMMERCIAL

## 2024-12-26 VITALS
HEIGHT: 65 IN | SYSTOLIC BLOOD PRESSURE: 110 MMHG | WEIGHT: 232.4 LBS | BODY MASS INDEX: 38.72 KG/M2 | DIASTOLIC BLOOD PRESSURE: 68 MMHG

## 2024-12-26 DIAGNOSIS — N76.0 ACUTE VAGINITIS: Primary | ICD-10-CM

## 2024-12-26 DIAGNOSIS — Z01.419 ENCOUNTER FOR GYNECOLOGICAL EXAMINATION WITHOUT ABNORMAL FINDING: ICD-10-CM

## 2024-12-26 NOTE — PROGRESS NOTES
Subjective   Chief Complaint   Patient presents with    Gynecologic Exam     Yearly exam and pap smear. Bleeding with intercourse.     Melvin Jarvis is a 34 y.o. year old .  No LMP recorded. Patient has had an implant.  She presents to be seen because of annual physical exam. Bleeding with intercuorse for a couple of months can be heavy can be light- lasting ~ one day. Occ green d/c    OTHER COMPLAINTS:  IUD placed 523.  Minimal periods    The following portions of the patient's history were reviewed and updated as appropriate:She  has a past medical history of Female infertility, GDM (gestational diabetes mellitus), class A1 (2022), Gestational HTN, third trimester (2022), Mild intermittent asthma with exacerbation (2022), Ovarian cyst, Placenta previa (), PMS (premenstrual syndrome), Recurrent pregnancy loss, antepartum condition or complication (), and Rh incompatibility ().  She does not have any pertinent problems on file.  She  has a past surgical history that includes Cholecystectomy; d & c with suction; Dilation and curettage of uterus; Laparoscopic cholecystectomy (); and [place] tooth extraction.  Her family history includes Asthma in her mother; Diabetes in her father; Hypertension in her mother.  She  reports that she quit smoking about 3 years ago. Her smoking use included cigarettes and electronic cigarette. She started smoking about 18 years ago. She has a 22.5 pack-year smoking history. She has never been exposed to tobacco smoke. She has never used smokeless tobacco. She reports that she does not drink alcohol and does not use drugs.  Current Outpatient Medications   Medication Sig Dispense Refill    citalopram (CeleXA) 10 MG tablet Take 1 tablet by mouth Daily. 90 tablet 1    Lancets (OneTouch Delica Plus [id number]) misc       metFORMIN ER (GLUCOPHAGE-XR) 500 MG 24 hr tablet Take 1 tablet by mouth 2 (Two) Times a Day. 180 tablet 1    OneTouch Ultra test  strip       Semaglutide-Weight Management (Wegovy) 0.25 MG/0.5ML solution auto-injector Inject 0.5 mL under the skin into the appropriate area as directed 1 (One) Time Per Week. 2 mL 0    vitamin D (ERGOCALCIFEROL) 1.25 MG (60154 UT) capsule capsule TAKE 1 CAPSULE BY MOUTH 3 TIMES WEEKLY ON MONDAY/ WEDNESDAY/ FRIDAY       No current facility-administered medications for this visit.     Current Outpatient Medications on File Prior to Visit   Medication Sig    citalopram (CeleXA) 10 MG tablet Take 1 tablet by mouth Daily.    Lancets (OneTouch Delica Plus [id number]) misc     metFORMIN ER (GLUCOPHAGE-XR) 500 MG 24 hr tablet Take 1 tablet by mouth 2 (Two) Times a Day.    OneTouch Ultra test strip     Semaglutide-Weight Management (Wegovy) 0.25 MG/0.5ML solution auto-injector Inject 0.5 mL under the skin into the appropriate area as directed 1 (One) Time Per Week.    vitamin D (ERGOCALCIFEROL) 1.25 MG (10972 UT) capsule capsule TAKE 1 CAPSULE BY MOUTH 3 TIMES WEEKLY ON MONDAY/ WEDNESDAY/ FRIDAY     No current facility-administered medications on file prior to visit.     She is allergic to amoxicillin, penicillins, shellfish-derived products, sulfa antibiotics, and latex.    Social History    Tobacco Use      Smoking status: Former        Packs/day: 0.00        Years: 1.5 packs/day for 15.0 years (22.5 ttl pk-yrs)        Types: Cigarettes, Electronic Cigarette        Start date: 2/24/2006        Quit date: 2/24/2021        Years since quitting: 3.8        Passive exposure: Never      Smokeless tobacco: Never    Review of Systems  Consitutional POS: nothing reported    NEG: anorexia or night sweats   Gastointestinal POS: nothing reported    NEG: bloating, change in bowel habits, melena, or reflux symptoms   Genitourinary POS: nothing reported    NEG: dysuria or hematuria   Integument POS: nothing reported    NEG: moles that are changing in size, shape, color or rashes   Breast POS: nothing reported    NEG: persistent breast  "lump, skin dimpling, or nipple discharge         Respiratory: negative  Cardiovascular: negative  GYN:   see HPI          Objective   /68   Ht 165.1 cm (65\")   Wt 105 kg (232 lb 6.4 oz)   BMI 38.67 kg/m²     General:  well developed; well nourished  no acute distress  mentation appropriate   Skin:  No suspicious lesions seen   Thyroid: normal to inspection and palpation   Lungs:  breathing is unlabored  clear to auscultation bilaterally   Heart:  regular rate and rhythm, S1, S2 normal, no murmur, click, rub or gallop   Breasts:  Examined in supine position  Symmetric without masses or skin dimpling  Nipples normal without inversion, lesions or discharge  There are no palpable axillary nodes   Abdomen: soft, non-tender; no masses  no umbilical or inguinal hernias are present  no hepato-splenomegaly   Pelvis: Clinical staff was present for exam  External genitalia:  normal appearance of the external genitalia including Bartholin's and [place]'s glands.  :  urethral meatus normal;  Vaginal:  normal pink mucosa without prolapse or lesions.  Cervix:  normal appearance.  Uterus:  normal size, shape and consistency.  Adnexa:  normal bimanual exam of the adnexa.  Rectal:  digital rectal exam not performed; anus visually normal appearing.     Psychiatric: Alert and oriented ×3, mood and affect appropriate  HEENT: Atraumatic, normocephalic, normal scleral icterus  Extremities: 2+ pulses bilaterally, no edema      Lab Review   PAP    Imaging   No data reviewed        Assessment   PE wNL  Post coital bleeding     Plan   PAP and swabs taken  Strings visible  Diet/exer cise  If bleeding persists for another 2 onths RTC for TVS    No orders of the defined types were placed in this encounter.         This note was electronically signed.      December 26, 2024      "

## 2024-12-28 LAB
A VAGINAE DNA VAG QL NAA+PROBE: ABNORMAL SCORE
BVAB2 DNA VAG QL NAA+PROBE: ABNORMAL SCORE
C ALBICANS DNA VAG QL NAA+PROBE: NEGATIVE
C GLABRATA DNA VAG QL NAA+PROBE: NEGATIVE
C TRACH DNA SPEC QL NAA+PROBE: NEGATIVE
MEGA1 DNA VAG QL NAA+PROBE: ABNORMAL SCORE
N GONORRHOEA DNA VAG QL NAA+PROBE: NEGATIVE
T VAGINALIS DNA VAG QL NAA+PROBE: NEGATIVE

## 2024-12-30 RX ORDER — METRONIDAZOLE 500 MG/1
500 TABLET ORAL 2 TIMES DAILY
Qty: 14 TABLET | Refills: 0 | Status: SHIPPED | OUTPATIENT
Start: 2024-12-30 | End: 2025-01-06

## 2025-01-02 LAB — REF LAB TEST METHOD: NORMAL

## 2025-02-12 ENCOUNTER — LAB (OUTPATIENT)
Dept: LAB | Facility: HOSPITAL | Age: 35
End: 2025-02-12
Payer: COMMERCIAL

## 2025-02-12 DIAGNOSIS — N91.2 AMENORRHEA: Primary | ICD-10-CM

## 2025-02-12 DIAGNOSIS — N91.2 AMENORRHEA: ICD-10-CM

## 2025-02-12 LAB — HCG INTACT+B SERPL-ACNC: 1094 MIU/ML

## 2025-02-12 PROCEDURE — 84702 CHORIONIC GONADOTROPIN TEST: CPT

## 2025-03-07 ENCOUNTER — INITIAL PRENATAL (OUTPATIENT)
Dept: OBSTETRICS AND GYNECOLOGY | Facility: CLINIC | Age: 35
End: 2025-03-07
Payer: COMMERCIAL

## 2025-03-07 VITALS — DIASTOLIC BLOOD PRESSURE: 72 MMHG | BODY MASS INDEX: 38.61 KG/M2 | WEIGHT: 232 LBS | SYSTOLIC BLOOD PRESSURE: 110 MMHG

## 2025-03-07 DIAGNOSIS — Z12.4 ENCOUNTER FOR SCREENING FOR CERVICAL CANCER: ICD-10-CM

## 2025-03-07 DIAGNOSIS — Z34.81 ENCOUNTER FOR SUPERVISION OF OTHER NORMAL PREGNANCY IN FIRST TRIMESTER: Primary | ICD-10-CM

## 2025-03-07 RX ORDER — DOCUSATE SODIUM 100 MG/1
100 CAPSULE, LIQUID FILLED ORAL 2 TIMES DAILY PRN
Qty: 60 CAPSULE | Refills: 4 | Status: SHIPPED | OUTPATIENT
Start: 2025-03-07

## 2025-03-07 NOTE — PROGRESS NOTES
Subjective     Chief Complaint   Patient presents with    Initial Prenatal Visit     New OB w/ no complaints/concerns        Melvin Jarvis is a 34 y.o. .  Patient's last menstrual period was 2025 (approximate)..  She presents to be seen to initiate prenatal care with our practice. She has been feeling well. She has been checking her sugars some due to hx of GDM. FBS 80s and 1 hr <120s.     G4  at term, PPH and placenta previa   G5  at 37 weeks, GHTN and GDM A2   G6  at 37w5d, GDM A2 Insulin    The following portions of the patient's history were reviewed and updated as appropriate:vital signs, allergies, current medications, past family history, past medical history, past social history, past surgical history, and problem list.    Review of Systems -   /72   Wt 105 kg (232 lb)   LMP 2025 (Approximate)   BMI 38.61 kg/m²   Gastrointestinal: Nausea and vomiting, denies constipation   Genitourinary: denies frequency, urgency, or burning with urination  All other systems were reviewed and are negative    Objective     Physical Exam  Constitutional   The patient is awake, alert, well developed, well nourished and well groomed.   EENT  The neck is supple and the trachea is midline. Thyroid without nodules  Respiratory  The patient is relaxed and breathes without effort.   Lungs CTAB  Cardiovascular  Heart normal rate and rhythm is regular without murmur    Gastrointestinal   The abdomen is soft and non tender.  No hepatosplenomegaly.  Skin  Normal color. No rashes or lesions  Musculoskeletal  Full ROM. Normal gait  Neuro  Speech is fluent and words are clear. Normal, appropriate behavior   Psychiatric :  Oriented to person, place, and time. Thought processes are coherent, insight is good.     Imaging   Pelvic ultrasound report  8w, + FHT  US Ob Transvaginal (2025 10:46)       Assessment & Plan     ASSESSMENT  IUP at 8w0d   Hx of GDM A1 and GDM A2  Hx of GHTN  Short  interval pregnancies   5.   Discomforts of pregnancy.    PLAN  Tests ordered today:  Orders Placed This Encounter   Procedures    Chlamydia trachomatis, Neisseria gonorrhoeae, PCR w/ confirmation - , Urine, Clean Catch     Order Specific Question:   Release to patient     Answer:   Routine Release [[id number]]    Urine Drug Screen - Urine, Clean Catch     Order Specific Question:   Release to patient     Answer:   Routine Release [[id number]]    OB Panel With HIV and RPR     Order Specific Question:   Release to patient     Answer:   Routine Release [[id number]]    Comprehensive Metabolic Panel     Order Specific Question:   Release to patient     Answer:   Routine Release [[id number]]    Hemoglobin A1c     Order Specific Question:   Release to patient     Answer:   Routine Release [[id number]]    Protein / Creatinine Ratio, Urine - Urine, Clean Catch     Order Specific Question:   Release to patient     Answer:   Routine Release [[id number]]     Medications prescribed today:  New Medications Ordered This Visit   Medications    docusate sodium (Colace) 100 MG capsule     Sig: Take 1 capsule by mouth 2 (Two) Times a Day As Needed for Constipation.     Dispense:  60 capsule     Refill:  4     Information reviewed: exercise in pregnancy, nutrition in pregnancy, weight gain in pregnancy, work and travel restrictions during pregnancy, list of OTC medications acceptable in pregnancy, and call coverage groups  The problem list for pregnancy was initiated today      Follow up: 4 week(s)         This note was electronically signed.    Crystal Craven CNM  3/7/2025

## 2025-03-11 ENCOUNTER — HOSPITAL ENCOUNTER (EMERGENCY)
Facility: HOSPITAL | Age: 35
Discharge: HOME | End: 2025-03-12
Attending: STUDENT IN AN ORGANIZED HEALTH CARE EDUCATION/TRAINING PROGRAM
Payer: COMMERCIAL

## 2025-03-11 ENCOUNTER — APPOINTMENT (OUTPATIENT)
Dept: ULTRASOUND IMAGING | Facility: HOSPITAL | Age: 35
End: 2025-03-11
Payer: COMMERCIAL

## 2025-03-11 DIAGNOSIS — O20.9 VAGINAL BLEEDING IN PREGNANCY, FIRST TRIMESTER: ICD-10-CM

## 2025-03-11 DIAGNOSIS — O20.0 THREATENED MISCARRIAGE IN EARLY PREGNANCY: Primary | ICD-10-CM

## 2025-03-11 LAB
ABO GROUP BLD: NORMAL
ALBUMIN SERPL-MCNC: 4.4 G/DL (ref 3.5–5.2)
ALBUMIN SERPL-MCNC: 4.5 G/DL (ref 3.9–4.9)
ALBUMIN/GLOB SERPL: 1.6 G/DL
ALP SERPL-CCNC: 40 U/L (ref 39–117)
ALP SERPL-CCNC: 44 IU/L (ref 44–121)
ALT SERPL W P-5'-P-CCNC: 17 U/L (ref 1–33)
ALT SERPL-CCNC: 17 IU/L (ref 0–32)
AMPHETAMINES UR QL SCN: NEGATIVE NG/ML
ANION GAP SERPL CALCULATED.3IONS-SCNC: 10.5 MMOL/L (ref 5–15)
AST SERPL-CCNC: 16 IU/L (ref 0–40)
AST SERPL-CCNC: 18 U/L (ref 1–32)
BACTERIA UR QL AUTO: ABNORMAL /HPF
BARBITURATES UR QL SCN: NEGATIVE NG/ML
BASOPHILS # BLD AUTO: 0 X10E3/UL (ref 0–0.2)
BASOPHILS # BLD AUTO: 0.04 10*3/MM3 (ref 0–0.2)
BASOPHILS NFR BLD AUTO: 0 %
BASOPHILS NFR BLD AUTO: 0.4 % (ref 0–1.5)
BENZODIAZ UR QL SCN: NEGATIVE NG/ML
BILIRUB SERPL-MCNC: 0.3 MG/DL (ref 0–1.2)
BILIRUB SERPL-MCNC: 0.5 MG/DL (ref 0–1.2)
BILIRUB UR QL STRIP: NEGATIVE
BLD GP AB SCN SERPL QL: NEGATIVE
BUN SERPL-MCNC: 6 MG/DL (ref 6–20)
BUN SERPL-MCNC: 9 MG/DL (ref 6–20)
BUN/CREAT SERPL: 15 (ref 9–23)
BUN/CREAT SERPL: 9.1 (ref 7–25)
BZE UR QL SCN: NEGATIVE NG/ML
C TRACH RRNA SPEC QL NAA+PROBE: NEGATIVE
CALCIUM SERPL-MCNC: 8.8 MG/DL (ref 8.7–10.2)
CALCIUM SPEC-SCNC: 8.7 MG/DL (ref 8.6–10.5)
CANNABINOIDS UR QL SCN: NEGATIVE NG/ML
CHLORIDE SERPL-SCNC: 100 MMOL/L (ref 98–107)
CHLORIDE SERPL-SCNC: 103 MMOL/L (ref 96–106)
CLARITY UR: CLEAR
CO2 SERPL-SCNC: 19 MMOL/L (ref 20–29)
CO2 SERPL-SCNC: 23.5 MMOL/L (ref 22–29)
COLOR UR: YELLOW
CREAT SERPL-MCNC: 0.62 MG/DL (ref 0.57–1)
CREAT SERPL-MCNC: 0.66 MG/DL (ref 0.57–1)
CREAT UR-MCNC: 11.6 MG/DL
CREAT UR-MCNC: 13.9 MG/DL (ref 20–300)
DEPRECATED RDW RBC AUTO: 41.3 FL (ref 37–54)
EGFRCR SERPLBLD CKD-EPI 2021: 118.2 ML/MIN/1.73
EGFRCR SERPLBLD CKD-EPI 2021: 120 ML/MIN/1.73
EOSINOPHIL # BLD AUTO: 0.1 X10E3/UL (ref 0–0.4)
EOSINOPHIL # BLD AUTO: 0.5 10*3/MM3 (ref 0–0.4)
EOSINOPHIL NFR BLD AUTO: 1 %
EOSINOPHIL NFR BLD AUTO: 5 % (ref 0.3–6.2)
ERYTHROCYTE [DISTWIDTH] IN BLOOD BY AUTOMATED COUNT: 12.6 % (ref 11.7–15.4)
ERYTHROCYTE [DISTWIDTH] IN BLOOD BY AUTOMATED COUNT: 13.1 % (ref 12.3–15.4)
GLOBULIN SER CALC-MCNC: 2.6 G/DL (ref 1.5–4.5)
GLOBULIN UR ELPH-MCNC: 2.7 GM/DL
GLUCOSE SERPL-MCNC: 111 MG/DL (ref 65–99)
GLUCOSE SERPL-MCNC: 92 MG/DL (ref 70–99)
GLUCOSE UR STRIP-MCNC: NEGATIVE MG/DL
HBA1C MFR BLD: 5.3 % (ref 4.8–5.6)
HBV SURFACE AG SERPL QL IA: NEGATIVE
HCG INTACT+B SERPL-ACNC: NORMAL MIU/ML
HCT VFR BLD AUTO: 35.8 % (ref 34–46.6)
HCT VFR BLD AUTO: 40 % (ref 34–46.6)
HCV AB SERPL QL IA: NON REACTIVE
HCV AB SERPL QL IA: NORMAL
HGB BLD-MCNC: 12.1 G/DL (ref 12–15.9)
HGB BLD-MCNC: 13.3 G/DL (ref 11.1–15.9)
HGB UR QL STRIP.AUTO: ABNORMAL
HIV 1+2 AB+HIV1 P24 AG SERPL QL IA: NON REACTIVE
HYALINE CASTS UR QL AUTO: ABNORMAL /LPF
IMM GRANULOCYTES # BLD AUTO: 0 X10E3/UL (ref 0–0.1)
IMM GRANULOCYTES # BLD AUTO: 0.05 10*3/MM3 (ref 0–0.05)
IMM GRANULOCYTES NFR BLD AUTO: 0 %
IMM GRANULOCYTES NFR BLD AUTO: 0.5 % (ref 0–0.5)
KETONES UR QL STRIP: NEGATIVE
LABORATORY COMMENT REPORT: ABNORMAL
LEUKOCYTE ESTERASE UR QL STRIP.AUTO: NEGATIVE
LIPASE SERPL-CCNC: 31 U/L (ref 13–60)
LYMPHOCYTES # BLD AUTO: 1.7 X10E3/UL (ref 0.7–3.1)
LYMPHOCYTES # BLD AUTO: 2.15 10*3/MM3 (ref 0.7–3.1)
LYMPHOCYTES NFR BLD AUTO: 20 %
LYMPHOCYTES NFR BLD AUTO: 21.7 % (ref 19.6–45.3)
Lab: NORMAL
MCH RBC QN AUTO: 29.2 PG (ref 26.6–33)
MCH RBC QN AUTO: 29.2 PG (ref 26.6–33)
MCHC RBC AUTO-ENTMCNC: 33.3 G/DL (ref 31.5–35.7)
MCHC RBC AUTO-ENTMCNC: 33.8 G/DL (ref 31.5–35.7)
MCV RBC AUTO: 86.5 FL (ref 79–97)
MCV RBC AUTO: 88 FL (ref 79–97)
METHADONE UR QL SCN: NEGATIVE NG/ML
MONOCYTES # BLD AUTO: 0.5 X10E3/UL (ref 0.1–0.9)
MONOCYTES # BLD AUTO: 0.72 10*3/MM3 (ref 0.1–0.9)
MONOCYTES NFR BLD AUTO: 6 %
MONOCYTES NFR BLD AUTO: 7.3 % (ref 5–12)
N GONORRHOEA RRNA SPEC QL NAA+PROBE: NEGATIVE
NEUTROPHILS # BLD AUTO: 6 X10E3/UL (ref 1.4–7)
NEUTROPHILS NFR BLD AUTO: 6.46 10*3/MM3 (ref 1.7–7)
NEUTROPHILS NFR BLD AUTO: 65.1 % (ref 42.7–76)
NEUTROPHILS NFR BLD AUTO: 73 %
NITRITE UR QL STRIP: NEGATIVE
NRBC BLD AUTO-RTO: 0 /100 WBC (ref 0–0.2)
NUMBER OF DOSES: NORMAL
OPIATES UR QL SCN: NEGATIVE NG/ML
OXYCODONE+OXYMORPHONE UR QL SCN: NEGATIVE NG/ML
PCP UR QL: NEGATIVE NG/ML
PH UR STRIP.AUTO: 6.5 [PH] (ref 5–8)
PH UR: 6.5 [PH] (ref 4.5–8.9)
PLATELET # BLD AUTO: 218 10*3/MM3 (ref 140–450)
PLATELET # BLD AUTO: 249 X10E3/UL (ref 150–450)
PMV BLD AUTO: 10.2 FL (ref 6–12)
POTASSIUM SERPL-SCNC: 3.4 MMOL/L (ref 3.5–5.2)
POTASSIUM SERPL-SCNC: 4.1 MMOL/L (ref 3.5–5.2)
PROPOXYPH UR QL SCN: NEGATIVE NG/ML
PROT SERPL-MCNC: 7.1 G/DL (ref 6–8.5)
PROT SERPL-MCNC: 7.1 G/DL (ref 6–8.5)
PROT UR QL STRIP: NEGATIVE
PROT UR-MCNC: 14.2 MG/DL
PROT/CREAT UR: 1224 MG/G CREAT (ref 0–200)
RBC # BLD AUTO: 4.14 10*6/MM3 (ref 3.77–5.28)
RBC # BLD AUTO: 4.56 X10E6/UL (ref 3.77–5.28)
RBC # UR STRIP: ABNORMAL /HPF
REF LAB TEST METHOD: ABNORMAL
RH BLD: NEGATIVE
RH BLD: NEGATIVE
RH BLD: NORMAL
RPR SER QL: NON REACTIVE
RUBV IGG SERPL IA-ACNC: 1.09 INDEX
SODIUM SERPL-SCNC: 134 MMOL/L (ref 136–145)
SODIUM SERPL-SCNC: 138 MMOL/L (ref 134–144)
SP GR UR STRIP: <=1.005 (ref 1–1.03)
SP GR UR: 1
SQUAMOUS #/AREA URNS HPF: ABNORMAL /HPF
UROBILINOGEN UR QL STRIP: ABNORMAL
WBC # BLD AUTO: 8.3 X10E3/UL (ref 3.4–10.8)
WBC # UR STRIP: ABNORMAL /HPF
WBC NRBC COR # BLD AUTO: 9.92 10*3/MM3 (ref 3.4–10.8)

## 2025-03-11 PROCEDURE — 80053 COMPREHEN METABOLIC PANEL: CPT | Performed by: STUDENT IN AN ORGANIZED HEALTH CARE EDUCATION/TRAINING PROGRAM

## 2025-03-11 PROCEDURE — 86901 BLOOD TYPING SEROLOGIC RH(D): CPT | Performed by: STUDENT IN AN ORGANIZED HEALTH CARE EDUCATION/TRAINING PROGRAM

## 2025-03-11 PROCEDURE — 99284 EMERGENCY DEPT VISIT MOD MDM: CPT | Performed by: STUDENT IN AN ORGANIZED HEALTH CARE EDUCATION/TRAINING PROGRAM

## 2025-03-11 PROCEDURE — 81001 URINALYSIS AUTO W/SCOPE: CPT | Performed by: STUDENT IN AN ORGANIZED HEALTH CARE EDUCATION/TRAINING PROGRAM

## 2025-03-11 PROCEDURE — 76817 TRANSVAGINAL US OBSTETRIC: CPT

## 2025-03-11 PROCEDURE — 83690 ASSAY OF LIPASE: CPT | Performed by: STUDENT IN AN ORGANIZED HEALTH CARE EDUCATION/TRAINING PROGRAM

## 2025-03-11 PROCEDURE — 84702 CHORIONIC GONADOTROPIN TEST: CPT | Performed by: STUDENT IN AN ORGANIZED HEALTH CARE EDUCATION/TRAINING PROGRAM

## 2025-03-11 PROCEDURE — 85025 COMPLETE CBC W/AUTO DIFF WBC: CPT | Performed by: STUDENT IN AN ORGANIZED HEALTH CARE EDUCATION/TRAINING PROGRAM

## 2025-03-11 PROCEDURE — 86900 BLOOD TYPING SEROLOGIC ABO: CPT | Performed by: STUDENT IN AN ORGANIZED HEALTH CARE EDUCATION/TRAINING PROGRAM

## 2025-03-11 RX ORDER — SODIUM CHLORIDE 0.9 % (FLUSH) 0.9 %
10 SYRINGE (ML) INJECTION AS NEEDED
Status: DISCONTINUED | OUTPATIENT
Start: 2025-03-11 | End: 2025-03-12 | Stop reason: HOSPADM

## 2025-03-12 VITALS
BODY MASS INDEX: 38.32 KG/M2 | TEMPERATURE: 97.7 F | SYSTOLIC BLOOD PRESSURE: 108 MMHG | OXYGEN SATURATION: 98 % | HEART RATE: 87 BPM | HEIGHT: 65 IN | WEIGHT: 230 LBS | RESPIRATION RATE: 18 BRPM | DIASTOLIC BLOOD PRESSURE: 55 MMHG

## 2025-03-12 PROCEDURE — 96372 THER/PROPH/DIAG INJ SC/IM: CPT

## 2025-03-12 PROCEDURE — 25010000002 RHO D IMMUNE GLOBULIN 1500 UNIT/2ML SOLUTION PREFILLED SYRINGE: Performed by: STUDENT IN AN ORGANIZED HEALTH CARE EDUCATION/TRAINING PROGRAM

## 2025-03-12 RX ADMIN — HUMAN RHO(D) IMMUNE GLOBULIN 300 MCG: 1500 SOLUTION INTRAMUSCULAR; INTRAVENOUS at 00:35

## 2025-03-12 NOTE — ED PROVIDER NOTES
[facility] EMERGENCY DEPARTMENT  Emergency Department Encounter  Emergency Medicine Physician Note       Pt Name: Melvin Jarvis  MRN: [id number]  Pt :   1990  Room Number:    Date of encounter:  3/11/2025  PCP: Ashleigh Dias MD  ED Provider: Jomar Valadez MD    Historian: Patient      HPI:  Chief Complaint: Vaginal bleed        Context: Melvin Jarvis is a 34 y.o. female who presents to the ED c/o pregnant vaginal bleeding.  Patient states she is approximately 8 to 9 weeks pregnant.  States tonight while she was cooking dinner she felt wetness in her underwear and when she checked it was blood.  States that she is still very lightly bleeding.  Denies any current pain.  Does report history of 3 miscarriages in the past and has had RhoGAM before.  Last miscarriage was in .  Denies any fever.  Had ultrasound on Friday that had normal heartbeat for age with intrauterine pregnancy per patient.      PAST MEDICAL HISTORY  Past Medical History:   Diagnosis Date    Female infertility     GDM (gestational diabetes mellitus), class A1 2022    Gestational HTN, third trimester 2022    Mild intermittent asthma with exacerbation 2022    Ovarian cyst     Placenta previa     current pregnancy    PMS (premenstrual syndrome)     Recurrent pregnancy loss, antepartum condition or complication     Rh incompatibility          PAST SURGICAL HISTORY  Past Surgical History:   Procedure Laterality Date    CHOLECYSTECTOMY      D & C WITH SUCTION      2007     DILATATION AND CURETTAGE      LAPAROSCOPIC CHOLECYSTECTOMY  2012    WISDOM TOOTH EXTRACTION           FAMILY HISTORY  Family History   Problem Relation Age of Onset    Diabetes Father     Hypertension Mother     Asthma Mother          SOCIAL HISTORY  Social History     Socioeconomic History    Marital status:      Spouse name: Kristine    Number of children: 1    Years of education: 13    Tobacco Use    Smoking status: Former     Current packs/day: 0.00     Average packs/day: 1.5 packs/day for 15.0 years (22.5 ttl pk-yrs)     Types: Cigarettes, Electronic Cigarette     Start date: 2006     Quit date: 2021     Years since quittin.0     Passive exposure: Never    Smokeless tobacco: Never   Vaping Use    Vaping status: Former    Substances: Nicotine    Devices: Disposable    Passive vaping exposure: Yes   Substance and Sexual Activity    Alcohol use: Never    Drug use: Never    Sexual activity: Yes     Partners: Male     Birth control/protection: None     Comment:          ALLERGIES  Amoxicillin, Penicillins, Shellfish-derived products, Sulfa antibiotics, and Latex        REVIEW OF SYSTEMS  Review of Systems     All systems reviewed and negative except for those discussed in HPI.       PHYSICAL EXAM    I have reviewed the triage vital signs and nursing notes.    ED Triage Vitals [25 1907]   Temp Heart Rate Resp BP SpO2   97.7 °F (36.5 °C) 87 18 136/75 99 %      Temp src Heart Rate Source Patient Position BP Location FiO2 (%)   Oral Monitor Sitting Left arm --       Physical Exam    General:  Awake, alert, no acute distress  HEENT: Atraumatic, normocephalic, EOMI, PERRLA, mucous membranes moist  NECK:  Supple, atraumatic  Cardiovascular:  Regular rate, regular rhythm, no murmurs, rubs, or gallops.  Extremities well perfused   Respiratory:  Regular rate, clear lungs to auscultation bilaterally.  No rhonchi, rales, wheezing  Abdominal:  Soft, nondistended, nontender.  No guarding or rebound.  No palpable masses  Extremity:  No visible bony abnormalities in all 4 extremities.  Full range of motion of all extremities.  Skin:  Warm and dry.  No rashes  Neuro:  AAOx3, GCS 15. Cranial nerves 2-12 grossly intact.  No focal strength or sensation deficits.  Psych:  Mood and affect appropriate.        LAB RESULTS  Recent Results (from the past 24 hours)   Comprehensive Metabolic Panel     Collection Time: 03/11/25  9:59 PM    Specimen: Blood   Result Value Ref Range    Glucose 111 (H) 65 - 99 mg/dL    BUN 6 6 - 20 mg/dL    Creatinine 0.66 0.57 - 1.00 mg/dL    Sodium 134 (L) 136 - 145 mmol/L    Potassium 3.4 (L) 3.5 - 5.2 mmol/L    Chloride 100 98 - 107 mmol/L    CO2 23.5 22.0 - 29.0 mmol/L    Calcium 8.7 8.6 - 10.5 mg/dL    Total Protein 7.1 6.0 - 8.5 g/dL    Albumin 4.4 3.5 - 5.2 g/dL    ALT (SGPT) 17 1 - 33 U/L    AST (SGOT) 18 1 - 32 U/L    Alkaline Phosphatase 40 39 - 117 U/L    Total Bilirubin 0.3 0.0 - 1.2 mg/dL    Globulin 2.7 gm/dL    A/G Ratio 1.6 g/dL    BUN/Creatinine Ratio 9.1 7.0 - 25.0    Anion Gap 10.5 5.0 - 15.0 mmol/L    eGFR 118.2 >60.0 mL/min/1.73   hCG, Quantitative, Pregnancy    Collection Time: 03/11/25  9:59 PM    Specimen: Blood   Result Value Ref Range    HCG Quantitative 93,708.00 mIU/mL   Lipase    Collection Time: 03/11/25  9:59 PM    Specimen: Blood   Result Value Ref Range    Lipase 31 13 - 60 U/L   Urinalysis With Microscopic If Indicated (No Culture) - Urine, Clean Catch    Collection Time: 03/11/25  9:59 PM    Specimen: Urine, Clean Catch   Result Value Ref Range    Color, UA Yellow Yellow, Straw    Appearance, UA Clear Clear    pH, UA 6.5 5.0 - 8.0    Specific Gravity, UA <=1.005 1.005 - 1.030    Glucose, UA Negative Negative    Ketones, UA Negative Negative    Bilirubin, UA Negative Negative    Blood, UA Large (3+) (A) Negative    Protein, UA Negative Negative    Leuk Esterase, UA Negative Negative    Nitrite, UA Negative Negative    Urobilinogen, UA 0.2 E.U./dL 0.2 - 1.0 E.U./dL   ABO / Rh    Collection Time: 03/11/25  9:59 PM    Specimen: Blood   Result Value Ref Range    ABO Type ab     RH type neg     ABORh Comment week d negative    CBC Auto Differential    Collection Time: 03/11/25  9:59 PM    Specimen: Blood   Result Value Ref Range    WBC 9.92 3.40 - 10.80 10*3/mm3    RBC 4.14 3.77 - 5.28 10*6/mm3    Hemoglobin 12.1 12.0 - 15.9 g/dL    Hematocrit 35.8  34.0 - 46.6 %    MCV 86.5 79.0 - 97.0 fL    MCH 29.2 26.6 - 33.0 pg    MCHC 33.8 31.5 - 35.7 g/dL    RDW 13.1 12.3 - 15.4 %    RDW-SD 41.3 37.0 - 54.0 fl    MPV 10.2 6.0 - 12.0 fL    Platelets 218 140 - 450 10*3/mm3    Neutrophil % 65.1 42.7 - 76.0 %    Lymphocyte % 21.7 19.6 - 45.3 %    Monocyte % 7.3 5.0 - 12.0 %    Eosinophil % 5.0 0.3 - 6.2 %    Basophil % 0.4 0.0 - 1.5 %    Immature Grans % 0.5 0.0 - 0.5 %    Neutrophils, Absolute 6.46 1.70 - 7.00 10*3/mm3    Lymphocytes, Absolute 2.15 0.70 - 3.10 10*3/mm3    Monocytes, Absolute 0.72 0.10 - 0.90 10*3/mm3    Eosinophils, Absolute 0.50 (H) 0.00 - 0.40 10*3/mm3    Basophils, Absolute 0.04 0.00 - 0.20 10*3/mm3    Immature Grans, Absolute 0.05 0.00 - 0.05 10*3/mm3    nRBC 0.0 0.0 - 0.2 /100 WBC    RhIg Evaluation    Collection Time: 25  9:59 PM    Specimen: Blood   Result Value Ref Range    ABO Type AB     RH type Negative    Doses of Rh Immune Globulin    Collection Time: 25  9:59 PM    Specimen: Blood   Result Value Ref Range    Number of Doses  Injection  - Recommend 1 vial of RhIg    Urinalysis, Microscopic Only - Urine, Clean Catch    Collection Time: 25  9:59 PM    Specimen: Urine, Clean Catch   Result Value Ref Range    RBC, UA 6-10 (A) None Seen, 0-2 /HPF    WBC, UA None Seen None Seen, 0-2 /HPF    Bacteria, UA None Seen None Seen /HPF    Squamous Epithelial Cells, UA 0-2 None Seen, 0-2 /HPF    Hyaline Casts, UA None Seen None Seen /LPF    Methodology Manual Light Microscopy        If labs were ordered, I independently evaluated the results and considered them in treating the patient.        RADIOLOGY  US Ob Transvaginal  Result Date: 3/11/2025  FINAL REPORT TECHNIQUE: null CLINICAL HISTORY: 8 to 9 weeks pregnant, vaginal bleeding COMPARISON: null FINDINGS: Exam: Obstetrical ultrasound. Comparison: None Clinical history: 8-9 weeks pregnant. Vaginal bleeding. Findings: Selected images from an obstetrical ultrasound are  provided for interpretation. Transvaginal imaging was performed. Single live intrauterine pregnancy. Crown rump length of 20.92 mm, corresponding to 8 weeks 5 days. Fetal heart rate 176 bpm. Volume of fluid within the gestational sac appears normal. No perigestational sac hemorrhage. No maternal uterine fibroids identified Cervical length of 4.37 cm. Right ovary measures 4.7 x 2.2 x 2.3 cm and contains a corpus luteum cyst. Left ovary measures 3.7 x 1.5 x 1.9 cm and has a normal appearance No free fluid.     IMPRESSION: 1. Single live intrauterine pregnancy, EGA 8 weeks 5 days, based on crown rump length. DAVID is 10/16/2025. Authenticated and Electronically Signed by Cassandra Watters MD on 03/11/2025 11:06:20 PM      I ordered and independently evaluated the above noted radiographic studies.     See radiologist's dictation for official interpretation.        PROCEDURES    Procedures    No orders to display       MEDICATIONS GIVEN IN ER    Medications   sodium chloride 0.9 % flush 10 mL (has no administration in time range)   Rho D Immune Globulin (RHOPHYLAC) injection 300 mcg (300 mcg Intramuscular Given 3/12/25 0035)         MEDICAL DECISION MAKING, PROGRESS, and CONSULTS    All labs, if obtained, have been independently reviewed by me.  All radiology studies, if obtained, have been evaluated by me and the radiologist dictating the report.  All EKG's, if obtained, have been independently viewed and interpreted by me.      Discussion below represents my analysis of pertinent findings related to patient's condition, differential diagnosis, treatment plan and final disposition.    Melvin Jarvis is a 34 y.o. female who presents to the ED c/o vaginal bleeding with pregnancy.  Hemodynamically stable nontoxic in appearance upon arrival.  Differential includes was not limited to threatened miscarriage, completed miscarriage, first trimester vaginal spotting/bleeding.  Labs ordered along with transvaginal  ultrasound.    Labs show no significant leukocytosis.  No critical electrolyte abnormalities.  Patient's blood type AB-.  hCG of 93,708.  Urinalysis negative for UTI.    Transvaginal ultrasound obtained and imaging was personally visualized showing intrauterine pregnancy measuring over 8 weeks gestation with heart rate of 176.  Full interpretation by radiology of ultrasound showed single live intrauterine pregnancy measuring approximately 8 weeks 5 days based on crown-rump length.    Discussed option of RhoGAM which patient was agreeable and was given one-time dose in emergency department.  Recommended close follow-up with her obstetrician as an outpatient, she states she has an appointment on Friday.  Advised on return precautions.  Extensively discussed potential for threatened miscarriage to which patient voiced understanding.  Patient agreeable with plan and was discharged.                                 Orders placed during this visit:  Orders Placed This Encounter   Procedures    US Ob Transvaginal    Comprehensive Metabolic Panel    hCG, Quantitative, Pregnancy    Lipase    Urinalysis With Microscopic If Indicated (No Culture) - Urine, Clean Catch    CBC Auto Differential    Urinalysis, Microscopic Only - Urine, Clean Catch    Ambulatory Referral to Obstetrics / Gynecology    ABO / Rh     RhIg Evaluation    Doses of Rh Immune Globulin    Insert Peripheral IV    CBC & Differential         ED Course:                  Shared Decision Making:  After my consideration of clinical presentation and any laboratory/radiology studies obtained, I discussed the findings with the patient/patient representative who is in agreement with the treatment plan and the final disposition.   Risks and benefits of discharge and/or observation/admission were discussed.      AS OF 00:43 EDT VITALS:    BP - 108/55  HR - 87  TEMP - 97.7 °F (36.5 °C) (Oral)  O2 SATS - 98%                  DIAGNOSIS  Final diagnoses:   Threatened  miscarriage in early pregnancy   Vaginal bleeding in pregnancy, first trimester         DISPOSITION  Discharge      Please note that portions of this document were completed with voice recognition software.        Jomar Valadez MD  03/12/25 004

## 2025-03-12 NOTE — DISCHARGE INSTRUCTIONS
Follow-up with obstetrician as soon as possible in clinic.  Return emergency department if you start having significant worsening of pain or uncontrollable vaginal bleeding.

## 2025-04-02 NOTE — TELEPHONE ENCOUNTER
Prior Auth was denied for Wegovy due to the following:    Our guidelines named Wegovy requires the following rules to be met for approval:  A. Medication is being prescribed to lower the risk of death from cardiovascular causes, myocardial infraction,or stroke. Please note: Wegovy used for weight loss is considered a benefit exclusion pursuant to the Social Security Act 1972.    B.The member is at least 45 years of age or older.      Patient has been informed.   Pt agreeable to start Fluoxetine.

## 2025-04-07 ENCOUNTER — ROUTINE PRENATAL (OUTPATIENT)
Dept: OBSTETRICS AND GYNECOLOGY | Facility: CLINIC | Age: 35
End: 2025-04-07
Payer: COMMERCIAL

## 2025-04-07 VITALS — WEIGHT: 233 LBS | BODY MASS INDEX: 38.77 KG/M2 | DIASTOLIC BLOOD PRESSURE: 64 MMHG | SYSTOLIC BLOOD PRESSURE: 122 MMHG

## 2025-04-07 DIAGNOSIS — Z13.79 GENETIC SCREENING: ICD-10-CM

## 2025-04-07 DIAGNOSIS — O21.9 NAUSEA AND VOMITING DURING PREGNANCY PRIOR TO 22 WEEKS GESTATION: ICD-10-CM

## 2025-04-07 DIAGNOSIS — K59.00 CONSTIPATION, UNSPECIFIED CONSTIPATION TYPE: ICD-10-CM

## 2025-04-07 DIAGNOSIS — Z34.81 ENCOUNTER FOR SUPERVISION OF OTHER NORMAL PREGNANCY IN FIRST TRIMESTER: Primary | ICD-10-CM

## 2025-04-07 DIAGNOSIS — K21.9 GASTROESOPHAGEAL REFLUX DISEASE WITHOUT ESOPHAGITIS: ICD-10-CM

## 2025-04-07 RX ORDER — DOCUSATE SODIUM 100 MG/1
100 CAPSULE, LIQUID FILLED ORAL 2 TIMES DAILY PRN
Qty: 60 CAPSULE | Refills: 4 | Status: SHIPPED | OUTPATIENT
Start: 2025-04-07

## 2025-04-07 RX ORDER — DOXYLAMINE SUCCINATE AND PYRIDOXINE HYDROCHLORIDE, DELAYED RELEASE TABLETS 10 MG/10 MG 10; 10 MG/1; MG/1
2 TABLET, DELAYED RELEASE ORAL NIGHTLY PRN
Qty: 120 TABLET | Refills: 3 | Status: SHIPPED | OUTPATIENT
Start: 2025-04-07

## 2025-04-07 RX ORDER — FAMOTIDINE 20 MG/1
20 TABLET, FILM COATED ORAL 2 TIMES DAILY
Qty: 60 TABLET | Refills: 5 | Status: SHIPPED | OUTPATIENT
Start: 2025-04-07

## 2025-04-07 NOTE — PROGRESS NOTES
Chief Complaint  Routine Prenatal Visit (Patient complains of increased nausea and vomiting. )    History of Present Illness:  Melvin is a  currently at 12w3d who presents today with complaints of nausea and vomiting.  Patient is having up to 2 episodes of emesis per day.  She has had reflux as well.  She has not been on any medications.  She does report having constipation as well.  She denies any vaginal bleeding or spotting.  She is desiring genetic screening.  Patient is also desiring tubal ligation.    Exam:  Vitals:  See prenatal flowsheet as noted and reviewed  General: Alert, cooperative, and does not appear in any distress  Abdomen:   See prenatal flowsheet as noted and reviewed    Uterus gravid, non-tender; no palpable masses    No guarding or rebound tenderness  Pelvic:  See prenatal flowsheet as noted and reviewed  Ext:  See prenatal flowsheet as noted and reviewed    Moves extremities well, no cyanosis and no redness  Urine:  See prenatal flowsheet as noted and reviewed    Data Review:  The following data was reviewed by: Paige Zaldivar MD on 2025:  Prenatal Labs:  Lab Results   Component Value Date    HGB 12.1 2025    RUBELLAABIGG 1.09 2025    HEPBSAG Negative 2025    ABO ab 2025    ABO AB 2025    RH neg 2025    RH Negative 2025    ABSCRN Negative 2025    [id number] Non Reactive 2025    HEPCVIRUSABY <0.1 2022    FFO8DSQM 212 (H) 2022    STREPGPB Positive (A) 2023    URINECX 50,000 CFU/mL Mixed Marine Isolated 2023       Admission on 2025, Discharged on 2025   Component Date Value    Glucose 2025 111 (H)     BUN 2025 6     Creatinine 2025 0.66     Sodium 2025 134 (L)     Potassium 2025 3.4 (L)     Chloride 2025 100     CO2 2025 23.5     Calcium 2025 8.7     Total Protein 2025 7.1     Albumin 2025 4.4     ALT (SGPT) 2025 17     AST (SGOT)  2025 18     Alkaline Phosphatase 2025 40     Total Bilirubin 2025 0.3     Globulin 2025 2.7     A/G Ratio 2025 1.6     BUN/Creatinine Ratio 2025 9.1     Anion Gap 2025 10.5     eGFR 2025 118.2     HCG Quantitative 2025 93,708.00     Lipase 2025 31     Color, UA 2025 Yellow     Appearance, UA 2025 Clear     pH, UA 2025 6.5     Specific Gravity, UA 2025 <=1.005     Glucose, UA 2025 Negative     Ketones, UA 2025 Negative     Bilirubin, UA 2025 Negative     Blood, UA 2025 Large (3+) (A)     Protein, UA 2025 Negative     Leuk Esterase, UA 2025 Negative     Nitrite, UA 2025 Negative     Urobilinogen, UA 2025 0.2 E.U./dL     ABO Type 2025 ab     RH type 2025 neg     ABORh Comment 2025 week d negative     WBC 2025 9.92     RBC 2025 4.14     Hemoglobin 2025 12.1     Hematocrit 2025 35.8     MCV 2025 86.5     MCH 2025 29.2     MCHC 2025 33.8     RDW 2025 13.1     RDW-SD 2025 41.3     MPV 2025 10.2     Platelets 2025 218     Neutrophil % 2025 65.1     Lymphocyte % 2025 21.7     Monocyte % 2025 7.3     Eosinophil % 2025 5.0     Basophil % 2025 0.4     Immature Grans % 2025 0.5     Neutrophils, Absolute 2025 6.46     Lymphocytes, Absolute 2025 2.15     Monocytes, Absolute 2025 0.72     Eosinophils, Absolute 2025 0.50 (H)     Basophils, Absolute 2025 0.04     Immature Grans, Absolute 2025 0.05     nRBC 2025 0.0     ABO Type 2025 AB     RH type 2025 Negative     Number of Doses 2025  Injection  - Recommend 1 vial of RhIg     RBC, UA 2025 6-10 (A)     WBC, UA 2025 None Seen     Bacteria, UA 2025 None Seen     Squamous Epithelial Cell* 2025 0-2     Hyaline Casts, UA 2025 None Seen      Methodology 03/11/2025 Manual Light Microscopy      Imaging:  US Ob Transvaginal  Narrative: FINAL REPORT    TECHNIQUE:  null    CLINICAL HISTORY:  8 to 9 weeks pregnant, vaginal bleeding    COMPARISON:  null    FINDINGS:  Exam: Obstetrical ultrasound.    Comparison: None    Clinical history: 8-9 weeks pregnant. Vaginal bleeding.    Findings:    Selected images from an obstetrical ultrasound are provided for interpretation.    Transvaginal imaging was performed.    Single live intrauterine pregnancy.    Crown rump length of 20.92 mm, corresponding to 8 weeks 5 days.    Fetal heart rate 176 bpm.    Volume of fluid within the gestational sac appears normal.    No perigestational sac hemorrhage.    No maternal uterine fibroids identified    Cervical length of 4.37 cm.    Right ovary measures 4.7 x 2.2 x 2.3 cm and contains a corpus luteum cyst.    Left ovary measures 3.7 x 1.5 x 1.9 cm and has a normal appearance    No free fluid.  Impression: IMPRESSION:    1. Single live intrauterine pregnancy, EGA 8 weeks 5 days, based on crown rump length. DAVID is 10/16/2025.    Authenticated and Electronically Signed by Cassandra Watters MD  on 03/11/2025 11:06:20 PM     Medical Records:  None    Assessment and Plan:  Problem List Items Addressed This Visit    None  Visit Diagnoses         Encounter for supervision of other normal pregnancy in first trimester    -  Primary  Topics discussed:     ab precautions  genetic screening - Today we discussed genetic testing.  She is aware that the MSAFP-4 and NIPT - [id number] is a screening test.  A screening test is not a diagnostic test.  This means that a negative test does not guarantee an unaffected fetus and a positive test does not mean the fetus has the condition for which the test is being performed.  If the test returns positive, a diagnostic test should be consider to determine if the fetus in fact has the condition.  After considering the options previously presented, she is  interested in having genetic testing performed.  GERD management  kick counts and fetal movement  PIH precautions  tubal risks, benefits      Relevant Orders    [id number] PLUS Core+SCA - Blood,      Genetic screening        Relevant Orders    [id number] PLUS Core+SCA - Blood,      Nausea and vomiting during pregnancy prior to 22 weeks gestation      Prescription given as noted.  Instructions and precautions have been given.  Patient to call if worsening and/or changes in her symptoms.    Relevant Medications    doxylamine-pyridoxine (DICLEGIS) 10-10 MG tablet delayed-release EC tablet      Constipation, unspecified constipation type      Prescription given for Colace as noted.  Patient to increase her p.o. fluids.    Relevant Medications    docusate sodium (Colace) 100 MG capsule      Gastroesophageal reflux disease without esophagitis      Prescription given for Pepcid twice daily.  Patient to call if worsening and/or changes in her symptoms.    Relevant Medications    famotidine (PEPCID) 20 MG tablet          Follow Up/Instructions:  Follow up as scheduled.  Patient was given instructions and counseling regarding her condition or for health maintenance advice. Please see specific information pulled into the AVS if appropriate.     Note: Speech recognition transcription software may have been used to dictate portions of this document.  An attempt at proofreading has been made though minor errors in transcription may still be present.    This note was electronically signed.  Paige Zaldivar M.D.

## 2025-04-13 LAB
CFDNA.FET/CFDNA.TOTAL SFR FETUS: NORMAL %
CITATION REF LAB TEST: NORMAL
FET 13+18+21+X+Y ANEUP PLAS.CFDNA: NEGATIVE
FET CHR 21 TS PLAS.CFDNA QL: NEGATIVE
FET MS X RISK WBC.DNA+CFDNA QL: NOT DETECTED
FET SEX PLAS.CFDNA DOSAGE CFDNA: NORMAL
FET TS 13 RISK PLAS.CFDNA QL: NEGATIVE
FET TS 18 RISK WBC.DNA+CFDNA QL: NEGATIVE
FET X + Y ANEUP RISK PLAS.CFDNA SEQ-IMP: NOT DETECTED
GA EST FROM CONCEPTION DATE: NORMAL D
GESTATIONAL AGE > 9:: YES
LAB DIRECTOR NAME PROVIDER: NORMAL
LAB DIRECTOR NAME PROVIDER: NORMAL
LABORATORY COMMENT REPORT: NORMAL
LIMITATIONS OF THE TEST: NORMAL
NEGATIVE PREDICTIVE VALUE: NORMAL
PERFORMANCE CHARACTERISTICS: NORMAL
POSITIVE PREDICTIVE VALUE: NORMAL
REF LAB TEST METHOD: NORMAL
SERVICE CMNT-IMP: NORMAL
TEST PERFORMANCE INFO SPEC: NORMAL

## 2025-04-17 ENCOUNTER — TELEPHONE (OUTPATIENT)
Dept: OBSTETRICS AND GYNECOLOGY | Facility: CLINIC | Age: 35
End: 2025-04-17
Payer: COMMERCIAL

## 2025-04-17 RX ORDER — BLOOD SUGAR DIAGNOSTIC
STRIP MISCELLANEOUS
Qty: 100 EACH | Refills: 2 | Status: SHIPPED | OUTPATIENT
Start: 2025-04-17

## 2025-05-05 ENCOUNTER — ROUTINE PRENATAL (OUTPATIENT)
Dept: OBSTETRICS AND GYNECOLOGY | Facility: CLINIC | Age: 35
End: 2025-05-05
Payer: COMMERCIAL

## 2025-05-05 VITALS — WEIGHT: 234 LBS | SYSTOLIC BLOOD PRESSURE: 118 MMHG | DIASTOLIC BLOOD PRESSURE: 66 MMHG | BODY MASS INDEX: 38.94 KG/M2

## 2025-05-05 DIAGNOSIS — Z34.82 PRENATAL CARE, SUBSEQUENT PREGNANCY IN SECOND TRIMESTER: Primary | ICD-10-CM

## 2025-05-05 PROCEDURE — 99213 OFFICE O/P EST LOW 20 MIN: CPT | Performed by: OBSTETRICS & GYNECOLOGY

## 2025-05-05 NOTE — PROGRESS NOTES
Prenatal Care Visit    Subjective   Chief Complaint   Patient presents with    Routine Prenatal Visit     No issues/concerns. Wanting to know if retinol facial cream is okay to use       History:   Melvin is a  currently at 16w3d who presents for a prenatal care visit today.    No major issues.    Social History    Tobacco Use      Smoking status: Former        Packs/day: 0.00        Years: 1.5 packs/day for 15.0 years (22.5 ttl pk-yrs)        Types: Cigarettes, Electronic Cigarette        Start date: 2006        Quit date: 2021        Years since quittin.1        Passive exposure: Never      Smokeless tobacco: Never       Objective   /66   Wt 106 kg (234 lb)   LMP 2025 (Approximate)   BMI 38.94 kg/m²   Physical Exam:  Normal, gestational age-appropriate exam today        Plan   Medical Decision Making:    I have reviewed the prenatal labs and ultrasound(s) today. I have reviewed the most recent prenatal progress note(s).    Diagnosis: Supervision of high risk pregnancy  AMA  BMI 39  Rh NEG  H/O gHTN + gDM  Desires sterilization   Tests/Orders/Rx today: No orders of the defined types were placed in this encounter.      Medication Management: none     Topics discussed: Prenatal care milestones  kick counts and fetal movement  PIH precautions   labor signs and symptoms  tubal risks, benefits   Tests next visit: U/S for anatomic screening   Next visit: 3 week(s)     Ramirez Flores MD  Obstetrics and Gynecology  [facility]

## 2025-05-30 ENCOUNTER — ROUTINE PRENATAL (OUTPATIENT)
Dept: OBSTETRICS AND GYNECOLOGY | Facility: CLINIC | Age: 35
End: 2025-05-30
Payer: COMMERCIAL

## 2025-05-30 VITALS — WEIGHT: 239 LBS | DIASTOLIC BLOOD PRESSURE: 66 MMHG | BODY MASS INDEX: 39.77 KG/M2 | SYSTOLIC BLOOD PRESSURE: 128 MMHG

## 2025-05-30 DIAGNOSIS — Z34.82 PRENATAL CARE, SUBSEQUENT PREGNANCY IN SECOND TRIMESTER: Primary | ICD-10-CM

## 2025-06-05 NOTE — PROGRESS NOTES
Prenatal Care Visit    Subjective   Chief Complaint   Patient presents with    Pregnancy Ultrasound     Anatomy scan done today       History:   Melvin is a  currently at 20w0d who presents for a prenatal care visit today.    No major issues.    Social History    Tobacco Use      Smoking status: Former        Packs/day: 0.00        Years: 1.5 packs/day for 15.0 years (22.5 ttl pk-yrs)        Types: Cigarettes, Electronic Cigarette        Start date: 2006        Quit date: 2021        Years since quittin.2        Passive exposure: Never      Smokeless tobacco: Never       Objective   /66   Wt 108 kg (239 lb)   LMP 2025 (Approximate)   BMI 39.77 kg/m²   Physical Exam:  Normal, gestational age-appropriate exam today        Plan   Medical Decision Making:    I have reviewed the prenatal labs and ultrasound(s) today. I have reviewed the most recent prenatal progress note(s).    Diagnosis: Supervision of high risk pregnancy  AMA  BMI 40  Rh NEG  H/O gHTN + gDM  Desires sterilization   Tests/Orders/Rx today: No orders of the defined types were placed in this encounter.      Medication Management: none     Topics discussed: Prenatal care milestones  kick counts and fetal movement  PIH precautions   labor signs and symptoms  tubal risks, benefits  U/S findings   Tests next visit: none   Next visit: 4 week(s)     Ramirez Flores MD  Obstetrics and Gynecology  [facility]

## 2025-06-27 ENCOUNTER — ROUTINE PRENATAL (OUTPATIENT)
Dept: OBSTETRICS AND GYNECOLOGY | Facility: CLINIC | Age: 35
End: 2025-06-27
Payer: COMMERCIAL

## 2025-06-27 VITALS — DIASTOLIC BLOOD PRESSURE: 72 MMHG | SYSTOLIC BLOOD PRESSURE: 128 MMHG | BODY MASS INDEX: 41.54 KG/M2 | WEIGHT: 249.6 LBS

## 2025-06-27 DIAGNOSIS — Z34.82 PRENATAL CARE, SUBSEQUENT PREGNANCY IN SECOND TRIMESTER: Primary | ICD-10-CM

## 2025-06-27 NOTE — PROGRESS NOTES
Prenatal Care Visit    Subjective   Chief Complaint   Patient presents with    Routine Prenatal Visit     No issues/concerns per pt       History:   Melvin is a  currently at 24w0d who presents for a prenatal care visit today.    No major issues.    Social History    Tobacco Use      Smoking status: Former        Packs/day: 0.00        Years: 1.5 packs/day for 15.0 years (22.5 ttl pk-yrs)        Types: Cigarettes, Electronic Cigarette        Start date: 2006        Quit date: 2021        Years since quittin.3        Passive exposure: Never      Smokeless tobacco: Never       Objective   /72   Wt 113 kg (249 lb 9.6 oz)   LMP 2025 (Approximate)   BMI 41.54 kg/m²   Physical Exam:  Normal, gestational age-appropriate exam today        Plan   Medical Decision Making:    I have reviewed the prenatal labs and ultrasound(s) today. I have reviewed the most recent prenatal progress note(s).    Diagnosis: Supervision of high risk pregnancy  AMA  BMI 41  Rh NEG  H/O gHTN + gDM  Desires sterilization   Tests/Orders/Rx today: No orders of the defined types were placed in this encounter.      Medication Management: none     Topics discussed: Prenatal care milestones  kick counts and fetal movement  PIH precautions   labor signs and symptoms  tubal risks, benefits  U/S findings  Continue checking FSBG values at home   Tests next visit: none   Next visit: 2 week(s)     Ramirez Flores MD  Obstetrics and Gynecology  [facility]

## 2025-07-16 ENCOUNTER — ROUTINE PRENATAL (OUTPATIENT)
Dept: OBSTETRICS AND GYNECOLOGY | Facility: CLINIC | Age: 35
End: 2025-07-16
Payer: COMMERCIAL

## 2025-07-16 ENCOUNTER — PATIENT MESSAGE (OUTPATIENT)
Dept: OBSTETRICS AND GYNECOLOGY | Facility: CLINIC | Age: 35
End: 2025-07-16

## 2025-07-16 VITALS — BODY MASS INDEX: 41.67 KG/M2 | DIASTOLIC BLOOD PRESSURE: 62 MMHG | SYSTOLIC BLOOD PRESSURE: 126 MMHG | WEIGHT: 250.4 LBS

## 2025-07-16 DIAGNOSIS — O24.419 GDM, CLASS A2: Primary | ICD-10-CM

## 2025-07-16 RX ORDER — GLYBURIDE 5 MG/1
5 TABLET ORAL
Qty: 30 TABLET | Refills: 3 | Status: SHIPPED | OUTPATIENT
Start: 2025-07-16 | End: 2025-07-17 | Stop reason: SDUPTHER

## 2025-07-16 NOTE — PROGRESS NOTES
Chief Complaint   Patient presents with    Routine Prenatal Visit     Prenatal visit with no problems or concerns        HPI:   , 26w5d gestation reports doing well    ROS:  See Prenatal Episode/Flowsheet  /62   Wt 114 kg (250 lb 6.4 oz)   LMP 2025 (Approximate)   BMI 41.67 kg/m²      EXAM:  EXTREMITIES:  No swelling-See Prenatal Episode/Flowsheet    ABDOMEN:  FHTs/Movement noted-See Prenatal Episode/Flowsheet    URINE GLUCOSE/PROTEIN:  See Prenatal Episode/Flowsheet    PELVIC EXAM:  See Prenatal Episode/Flowsheet  CV:  Lungs:  GYN:    MDM:    Lab Results   Component Value Date    HGB 12.1 2025    RUBELLAABIGG 1.09 2025    HEPBSAG Negative 2025    ABO ab 2025    ABO AB 2025    RH neg 2025    RH Negative 2025    ABSCRN Negative 2025    CHC8YCW6 Non Reactive 2025    HEPCVIRUSABY <0.1 2022    AIZ7OOZU 212 (H) 2022    STREPGPB Positive (A) 2023    URINECX 50,000 CFU/mL Mixed Marine Isolated 2023       U/S:US Ob 14 + Weeks Single or First Gestation (2025 11:43)     1. IUP 26w5d    2. Routine care   3. GDM: Postprandials overall look good.  Fastings are all persistently elevated in the low 100s.  Start glyburide 5 mg p.o. nightly.  Last pregnancy patient did require insulin.

## 2025-07-17 RX ORDER — GLYBURIDE 5 MG/1
5 TABLET ORAL
Qty: 30 TABLET | Refills: 3 | Status: SHIPPED | OUTPATIENT
Start: 2025-07-17

## 2025-07-18 NOTE — TELEPHONE ENCOUNTER
New medication is causing her blood sugar to be too low.  She is waking up with it being 62 with nausea, dizziness, sweating.

## 2025-07-30 ENCOUNTER — ROUTINE PRENATAL (OUTPATIENT)
Dept: OBSTETRICS AND GYNECOLOGY | Facility: CLINIC | Age: 35
End: 2025-07-30
Payer: COMMERCIAL

## 2025-07-30 VITALS — WEIGHT: 255.4 LBS | SYSTOLIC BLOOD PRESSURE: 134 MMHG | BODY MASS INDEX: 42.5 KG/M2 | DIASTOLIC BLOOD PRESSURE: 64 MMHG

## 2025-07-30 DIAGNOSIS — Z67.91 RH NEGATIVE STATUS DURING PREGNANCY IN SECOND TRIMESTER: ICD-10-CM

## 2025-07-30 DIAGNOSIS — R30.0 DYSURIA: ICD-10-CM

## 2025-07-30 DIAGNOSIS — Z34.83 PRENATAL CARE, SUBSEQUENT PREGNANCY IN THIRD TRIMESTER: Primary | ICD-10-CM

## 2025-07-30 DIAGNOSIS — O26.892 RH NEGATIVE STATUS DURING PREGNANCY IN SECOND TRIMESTER: ICD-10-CM

## 2025-07-30 RX ORDER — GLYBURIDE 5 MG/1
7.5 TABLET ORAL
Qty: 45 TABLET | Refills: 5 | Status: SHIPPED | OUTPATIENT
Start: 2025-07-30

## 2025-07-30 NOTE — PROGRESS NOTES
Prenatal Care Visit    Subjective   Chief Complaint   Patient presents with    Routine Prenatal Visit     C/o some pressure when urinating and the constant urge to go. Questions regarding her glucose levels. Concerned about weight gain.    Injections     Rhogam        History:   Melvin is a  currently at 28w5d who presents for a prenatal care visit today.    No major issues.    Social History    Tobacco Use      Smoking status: Former        Packs/day: 0.00        Years: 1.5 packs/day for 15.0 years (22.5 ttl pk-yrs)        Types: Cigarettes, Electronic Cigarette        Start date: 2006        Quit date: 2021        Years since quittin.4        Passive exposure: Never      Smokeless tobacco: Never       Objective   /64   Wt 116 kg (255 lb 6.4 oz)   LMP 2025 (Approximate)   BMI 42.50 kg/m²   Physical Exam:  Normal, gestational age-appropriate exam today        Plan   Medical Decision Making:    I have reviewed the prenatal labs and ultrasound(s) today. I have reviewed the most recent prenatal progress note(s).    Diagnosis: Supervision of high risk pregnancy  A2DM  AMA  BMI 42  Rh NEG  H/O gHTN + gDM  Desires sterilization   Tests/Orders/Rx today: Orders Placed This Encounter   Procedures    Rhogam Immune Globulin Immunization    UA / M With / Rflx Culture([facility] ONLY) - Urine, Clean Catch     Release to patient:   Routine Release [[id number]]       Medication Management: Increase glyburide to 7.5 mg nightly     Topics discussed: Prenatal care milestones  glucose management  kick counts and fetal movement  PIH precautions   labor signs and symptoms  tubal risks, benefits  She desires salpingectomy as an interval laparoscopic procedure following delivery   Tests next visit: none   Next visit: 1 week(s)     Ramirez Flores MD  Obstetrics and Gynecology  [facility]

## 2025-07-31 ENCOUNTER — PATIENT MESSAGE (OUTPATIENT)
Dept: OBSTETRICS AND GYNECOLOGY | Facility: CLINIC | Age: 35
End: 2025-07-31
Payer: COMMERCIAL

## 2025-08-02 LAB
APPEARANCE UR: CLEAR
BACTERIA #/AREA URNS HPF: ABNORMAL /[HPF]
BACTERIA UR CULT: ABNORMAL
BACTERIA UR CULT: ABNORMAL
BILIRUB UR QL STRIP: NEGATIVE
CASTS URNS QL MICRO: ABNORMAL /LPF
COLOR UR: YELLOW
EPI CELLS #/AREA URNS HPF: ABNORMAL /HPF (ref 0–10)
GLUCOSE UR QL STRIP: NEGATIVE
HGB UR QL STRIP: ABNORMAL
KETONES UR QL STRIP: NEGATIVE
LEUKOCYTE ESTERASE UR QL STRIP: NEGATIVE
MICRO URNS: ABNORMAL
MUCOUS THREADS URNS QL MICRO: PRESENT
NITRITE UR QL STRIP: NEGATIVE
PH UR STRIP: 6.5 [PH] (ref 5–7.5)
PROT UR QL STRIP: ABNORMAL
RBC #/AREA URNS HPF: ABNORMAL /HPF (ref 0–2)
SP GR UR STRIP: 1.02 (ref 1–1.03)
URINALYSIS REFLEX: ABNORMAL
UROBILINOGEN UR STRIP-MCNC: 0.2 MG/DL (ref 0.2–1)
WBC #/AREA URNS HPF: ABNORMAL /HPF (ref 0–5)

## 2025-08-04 DIAGNOSIS — N30.00 ACUTE CYSTITIS WITHOUT HEMATURIA: Primary | ICD-10-CM

## 2025-08-04 RX ORDER — NITROFURANTOIN 25; 75 MG/1; MG/1
100 CAPSULE ORAL 2 TIMES DAILY
Qty: 14 CAPSULE | Refills: 0 | Status: SHIPPED | OUTPATIENT
Start: 2025-08-04 | End: 2025-08-11

## 2025-08-07 ENCOUNTER — ROUTINE PRENATAL (OUTPATIENT)
Dept: OBSTETRICS AND GYNECOLOGY | Facility: CLINIC | Age: 35
End: 2025-08-07
Payer: COMMERCIAL

## 2025-08-07 VITALS — SYSTOLIC BLOOD PRESSURE: 122 MMHG | BODY MASS INDEX: 41.87 KG/M2 | WEIGHT: 251.6 LBS | DIASTOLIC BLOOD PRESSURE: 70 MMHG

## 2025-08-07 DIAGNOSIS — O24.419 GESTATIONAL DIABETES MELLITUS, CLASS A2: ICD-10-CM

## 2025-08-07 DIAGNOSIS — Z30.2 REQUEST FOR STERILIZATION: ICD-10-CM

## 2025-08-07 DIAGNOSIS — Z34.83 PRENATAL CARE, SUBSEQUENT PREGNANCY IN THIRD TRIMESTER: Primary | ICD-10-CM

## 2025-08-07 RX ORDER — GLYBURIDE 5 MG/1
10 TABLET ORAL
Qty: 60 TABLET | Refills: 5 | Status: SHIPPED | OUTPATIENT
Start: 2025-08-07

## 2025-08-13 ENCOUNTER — ROUTINE PRENATAL (OUTPATIENT)
Dept: OBSTETRICS AND GYNECOLOGY | Facility: CLINIC | Age: 35
End: 2025-08-13
Payer: COMMERCIAL

## 2025-08-13 VITALS — SYSTOLIC BLOOD PRESSURE: 112 MMHG | WEIGHT: 251.4 LBS | BODY MASS INDEX: 41.84 KG/M2 | DIASTOLIC BLOOD PRESSURE: 62 MMHG

## 2025-08-13 DIAGNOSIS — O24.419 GESTATIONAL DIABETES MELLITUS, CLASS A2: ICD-10-CM

## 2025-08-13 DIAGNOSIS — Z30.2 REQUEST FOR STERILIZATION: ICD-10-CM

## 2025-08-13 DIAGNOSIS — Z34.83 PRENATAL CARE, SUBSEQUENT PREGNANCY IN THIRD TRIMESTER: Primary | ICD-10-CM

## 2025-08-22 ENCOUNTER — ROUTINE PRENATAL (OUTPATIENT)
Dept: OBSTETRICS AND GYNECOLOGY | Facility: CLINIC | Age: 35
End: 2025-08-22
Payer: COMMERCIAL

## 2025-08-22 VITALS — DIASTOLIC BLOOD PRESSURE: 66 MMHG | WEIGHT: 249.3 LBS | SYSTOLIC BLOOD PRESSURE: 122 MMHG | BODY MASS INDEX: 41.49 KG/M2

## 2025-08-22 DIAGNOSIS — O24.419 GESTATIONAL DIABETES MELLITUS, CLASS A2: Primary | ICD-10-CM

## 2025-08-22 DIAGNOSIS — O09.523 MULTIGRAVIDA OF ADVANCED MATERNAL AGE IN THIRD TRIMESTER: ICD-10-CM
